# Patient Record
Sex: FEMALE | Race: WHITE | Employment: OTHER | URBAN - METROPOLITAN AREA
[De-identification: names, ages, dates, MRNs, and addresses within clinical notes are randomized per-mention and may not be internally consistent; named-entity substitution may affect disease eponyms.]

---

## 2017-01-10 ENCOUNTER — OFFICE VISIT (OUTPATIENT)
Dept: INTERNAL MEDICINE CLINIC | Facility: CLINIC | Age: 72
End: 2017-01-10

## 2017-01-10 VITALS
HEIGHT: 61 IN | SYSTOLIC BLOOD PRESSURE: 140 MMHG | WEIGHT: 179.19 LBS | DIASTOLIC BLOOD PRESSURE: 80 MMHG | HEART RATE: 76 BPM | BODY MASS INDEX: 33.83 KG/M2

## 2017-01-10 DIAGNOSIS — E78.5 HYPERLIPIDEMIA, UNSPECIFIED HYPERLIPIDEMIA TYPE: Primary | ICD-10-CM

## 2017-01-10 DIAGNOSIS — I35.0 NONRHEUMATIC AORTIC VALVE STENOSIS: ICD-10-CM

## 2017-01-10 DIAGNOSIS — I10 ESSENTIAL HYPERTENSION: ICD-10-CM

## 2017-01-10 DIAGNOSIS — R92.2 DENSE BREAST TISSUE ON MAMMOGRAM: ICD-10-CM

## 2017-01-10 DIAGNOSIS — J45.30 ASTHMA WITH ALLERGIC RHINITIS, MILD PERSISTENT, UNCOMPLICATED: ICD-10-CM

## 2017-01-10 DIAGNOSIS — Z78.0 MENOPAUSE: ICD-10-CM

## 2017-01-10 PROCEDURE — 99212 OFFICE O/P EST SF 10 MIN: CPT | Performed by: INTERNAL MEDICINE

## 2017-01-10 PROCEDURE — 99214 OFFICE O/P EST MOD 30 MIN: CPT | Performed by: INTERNAL MEDICINE

## 2017-01-10 RX ORDER — FUROSEMIDE 20 MG/1
TABLET ORAL
Qty: 90 TABLET | Refills: 1 | Status: SHIPPED | OUTPATIENT
Start: 2017-01-10 | End: 2018-01-26

## 2017-01-10 RX ORDER — LEVALBUTEROL TARTRATE 45 UG/1
2 AEROSOL, METERED ORAL EVERY 8 HOURS PRN
Qty: 3 INHALER | Refills: 1 | Status: SHIPPED | OUTPATIENT
Start: 2017-01-10 | End: 2017-08-01

## 2017-01-10 RX ORDER — ROSUVASTATIN CALCIUM 10 MG/1
TABLET, COATED ORAL
Qty: 90 TABLET | Refills: 2 | Status: SHIPPED | OUTPATIENT
Start: 2017-01-10 | End: 2017-08-01

## 2017-01-10 RX ORDER — BUDESONIDE AND FORMOTEROL FUMARATE DIHYDRATE 160; 4.5 UG/1; UG/1
AEROSOL RESPIRATORY (INHALATION)
Qty: 3 INHALER | Refills: 3 | Status: SHIPPED | OUTPATIENT
Start: 2017-01-10 | End: 2018-01-26

## 2017-01-10 RX ORDER — LOSARTAN POTASSIUM 100 MG/1
TABLET ORAL
Qty: 90 TABLET | Refills: 2 | Status: SHIPPED | OUTPATIENT
Start: 2017-01-10 | End: 2017-08-01

## 2017-01-10 NOTE — ASSESSMENT & PLAN NOTE
Last echocardiogram on March 2015. Will be due for a repeat this year. Mild aortic stenosis with a mean gradient of about 12 and valve area of about 1.78 cm.   Systolic function was however normal.  Patient has been asymptomatic at this time but has had p

## 2017-01-10 NOTE — ASSESSMENT & PLAN NOTE
Blood pressure 140/80, pulse 76, height 5' 1\" (1.549 m), weight 179 lb 3.2 oz (81.285 kg). stable blood pressure on losartan at 100 mg 1 tablet once daily and Lasix at 20 mg 1 tablet daily. No lower extremity edema at this point.   Recheck labs have bee

## 2017-01-10 NOTE — PATIENT INSTRUCTIONS
Problem List Items Addressed This Visit        Unprioritized    Aortic stenosis     Last echocardiogram on March 2015. Will be due for a repeat this year. Mild aortic stenosis with a mean gradient of about 12 and valve area of about 1.78 cm.   Systolic fu Relevant Orders    URINALYSIS, ROUTINE    Menopause     DEXA scan has been reordered.          Relevant Orders    XR DEXA BONE DENSITOMETRY (CPT=77080)

## 2017-01-10 NOTE — PROGRESS NOTES
HPI:    Patient ID: Nola Bragg is a 66year old female.     HPI Comments: Review of patient's family history indicates:    Heart Disorder                 Father                      Comment: mi    High Cholesterol               Father                    O pain or shortness of breath. Current antihyperlipidemic treatment includes diet change, exercise and statins. The current treatment provides moderate improvement of lipids. Compliance problems include adherence to diet and adherence to exercise.   Risk fact Aerosol Inhale 2 puffs into the lungs every 8 (eight) hours as needed for Wheezing or Shortness of Breath.  Disp: 3 Inhaler Rfl: 1   Budesonide-Formoterol Fumarate (SYMBICORT) 160-4.5 MCG/ACT Inhalation Aerosol 2 PUFFS BID Disp: 3 Inhaler Rfl: 3   furosemid heard.  Pulmonary/Chest: Effort normal and breath sounds normal. She has no wheezes. She has no rales. She exhibits no tenderness. Abdominal: Soft. Bowel sounds are normal. She exhibits no distension and no mass. There is no tenderness.  There is no rebou 160-4.5 MCG/ACT Inhalation Aerosol    Dense breast tissue on mammogram     Six-month follow-up diagnostic mammogram with ultrasounds have been ordered.          Relevant Orders    DORIS DIAGNOSTIC BILATERAL (CPT=77066)    Hyperlipidemia - Primary     Lipid pa BY MOUTH INTO THE LUNGS DAILY      Levalbuterol Tartrate (XOPENEX HFA) 45 MCG/ACT Inhalation Aerosol 3 Inhaler 1      Sig: Inhale 2 puffs into the lungs every 8 (eight) hours as needed for Wheezing or Shortness of Breath.       Budesonide-Formoterol Fumarat

## 2017-01-10 NOTE — ASSESSMENT & PLAN NOTE
Patient has been on Symbicort 2 puffs 2 times daily with Asmanex 1 puff once daily in addition. She has been using the Xopenex inhaler 2 puffs occasionally as needed. She does not need this on a regular basis.

## 2017-01-10 NOTE — ASSESSMENT & PLAN NOTE
Lipid panel and liver function tests have been stable on Crestor 10 mg 1 tablet once daily. Recheck labs have been ordered.

## 2017-01-19 ENCOUNTER — HOSPITAL ENCOUNTER (OUTPATIENT)
Dept: BONE DENSITY | Age: 72
Discharge: HOME | End: 2017-01-19
Attending: INTERNAL MEDICINE
Payer: COMMERCIAL

## 2017-01-19 ENCOUNTER — LAB ENCOUNTER (OUTPATIENT)
Dept: LAB | Age: 72
End: 2017-01-19
Attending: INTERNAL MEDICINE
Payer: COMMERCIAL

## 2017-01-19 DIAGNOSIS — Z78.0 MENOPAUSE: ICD-10-CM

## 2017-01-19 DIAGNOSIS — I10 ESSENTIAL HYPERTENSION: ICD-10-CM

## 2017-01-19 DIAGNOSIS — E78.5 HYPERLIPIDEMIA, UNSPECIFIED HYPERLIPIDEMIA TYPE: ICD-10-CM

## 2017-01-19 LAB
ALBUMIN SERPL BCP-MCNC: 4.1 G/DL (ref 3.5–4.8)
ALBUMIN/GLOB SERPL: 1.2 {RATIO} (ref 1–2)
ALP SERPL-CCNC: 85 U/L (ref 32–100)
ALT SERPL-CCNC: 24 U/L (ref 14–54)
ANION GAP SERPL CALC-SCNC: 10 MMOL/L (ref 0–18)
AST SERPL-CCNC: 23 U/L (ref 15–41)
BACTERIA UR QL AUTO: NEGATIVE /HPF
BASOPHILS # BLD: 0.1 K/UL (ref 0–0.2)
BASOPHILS NFR BLD: 1 %
BILIRUB SERPL-MCNC: 0.8 MG/DL (ref 0.3–1.2)
BILIRUB UR QL: NEGATIVE
BUN SERPL-MCNC: 12 MG/DL (ref 8–20)
BUN/CREAT SERPL: 16 (ref 10–20)
CALCIUM SERPL-MCNC: 9.4 MG/DL (ref 8.5–10.5)
CHLORIDE SERPL-SCNC: 101 MMOL/L (ref 95–110)
CHOLEST SERPL-MCNC: 159 MG/DL (ref 110–200)
CLARITY UR: CLEAR
CO2 SERPL-SCNC: 27 MMOL/L (ref 22–32)
COLOR UR: YELLOW
CREAT SERPL-MCNC: 0.75 MG/DL (ref 0.5–1.5)
EOSINOPHIL # BLD: 0.1 K/UL (ref 0–0.7)
EOSINOPHIL NFR BLD: 1 %
ERYTHROCYTE [DISTWIDTH] IN BLOOD BY AUTOMATED COUNT: 14.2 % (ref 11–15)
GLOBULIN PLAS-MCNC: 3.3 G/DL (ref 2.5–3.7)
GLUCOSE SERPL-MCNC: 94 MG/DL (ref 70–99)
GLUCOSE UR-MCNC: NEGATIVE MG/DL
HCT VFR BLD AUTO: 42.9 % (ref 35–48)
HDLC SERPL-MCNC: 63 MG/DL
HGB BLD-MCNC: 14 G/DL (ref 12–16)
HGB UR QL STRIP.AUTO: NEGATIVE
KETONES UR-MCNC: NEGATIVE MG/DL
LDLC SERPL CALC-MCNC: 80 MG/DL (ref 0–99)
LYMPHOCYTES # BLD: 1.9 K/UL (ref 1–4)
LYMPHOCYTES NFR BLD: 25 %
MCH RBC QN AUTO: 27.8 PG (ref 27–32)
MCHC RBC AUTO-ENTMCNC: 32.6 G/DL (ref 32–37)
MCV RBC AUTO: 85.4 FL (ref 80–100)
MONOCYTES # BLD: 0.5 K/UL (ref 0–1)
MONOCYTES NFR BLD: 7 %
NEUTROPHILS # BLD AUTO: 5 K/UL (ref 1.8–7.7)
NEUTROPHILS NFR BLD: 66 %
NITRITE UR QL STRIP.AUTO: NEGATIVE
NONHDLC SERPL-MCNC: 96 MG/DL
OSMOLALITY UR CALC.SUM OF ELEC: 286 MOSM/KG (ref 275–295)
PH UR: 6 [PH] (ref 5–8)
PLATELET # BLD AUTO: 225 K/UL (ref 140–400)
PMV BLD AUTO: 10 FL (ref 7.4–10.3)
POTASSIUM SERPL-SCNC: 4.1 MMOL/L (ref 3.3–5.1)
PROT SERPL-MCNC: 7.4 G/DL (ref 5.9–8.4)
PROT UR-MCNC: NEGATIVE MG/DL
RBC # BLD AUTO: 5.02 M/UL (ref 3.7–5.4)
RBC #/AREA URNS AUTO: <1 /HPF
SODIUM SERPL-SCNC: 138 MMOL/L (ref 136–144)
SP GR UR STRIP: 1.01 (ref 1–1.03)
TRIGL SERPL-MCNC: 80 MG/DL (ref 1–149)
TSH SERPL-ACNC: 1.57 UIU/ML (ref 0.34–5.6)
UROBILINOGEN UR STRIP-ACNC: <2
VIT B12 SERPL-MCNC: 331 PG/ML (ref 181–914)
VIT C UR-MCNC: NEGATIVE MG/DL
WBC # BLD AUTO: 7.6 K/UL (ref 4–11)
WBC #/AREA URNS AUTO: 2 /HPF

## 2017-01-19 PROCEDURE — 82607 VITAMIN B-12: CPT

## 2017-01-19 PROCEDURE — 80053 COMPREHEN METABOLIC PANEL: CPT

## 2017-01-19 PROCEDURE — 84443 ASSAY THYROID STIM HORMONE: CPT

## 2017-01-19 PROCEDURE — 36415 COLL VENOUS BLD VENIPUNCTURE: CPT

## 2017-01-19 PROCEDURE — 80061 LIPID PANEL: CPT

## 2017-01-19 PROCEDURE — 85025 COMPLETE CBC W/AUTO DIFF WBC: CPT

## 2017-01-19 PROCEDURE — 81001 URINALYSIS AUTO W/SCOPE: CPT

## 2017-01-19 PROCEDURE — 82306 VITAMIN D 25 HYDROXY: CPT

## 2017-01-19 PROCEDURE — 77080 DXA BONE DENSITY AXIAL: CPT

## 2017-01-20 LAB — 25(OH)D3 SERPL-MCNC: 33 NG/ML

## 2017-01-23 ENCOUNTER — HOSPITAL ENCOUNTER (OUTPATIENT)
Dept: MAMMOGRAPHY | Facility: HOSPITAL | Age: 72
Discharge: HOME | End: 2017-01-23
Attending: INTERNAL MEDICINE
Payer: COMMERCIAL

## 2017-01-23 DIAGNOSIS — R92.2 DENSE BREAST TISSUE ON MAMMOGRAM: ICD-10-CM

## 2017-01-23 PROCEDURE — 77066 DX MAMMO INCL CAD BI: CPT | Performed by: RADIOLOGY

## 2017-08-01 ENCOUNTER — OFFICE VISIT (OUTPATIENT)
Dept: INTERNAL MEDICINE CLINIC | Facility: CLINIC | Age: 72
End: 2017-08-01

## 2017-08-01 VITALS
WEIGHT: 184 LBS | DIASTOLIC BLOOD PRESSURE: 73 MMHG | HEART RATE: 76 BPM | BODY MASS INDEX: 34.74 KG/M2 | HEIGHT: 61 IN | SYSTOLIC BLOOD PRESSURE: 154 MMHG | TEMPERATURE: 99 F

## 2017-08-01 DIAGNOSIS — Z23 NEED FOR VACCINATION: ICD-10-CM

## 2017-08-01 DIAGNOSIS — I35.0 NONRHEUMATIC AORTIC VALVE STENOSIS: Primary | ICD-10-CM

## 2017-08-01 DIAGNOSIS — Z00.00 ROUTINE PHYSICAL EXAMINATION: ICD-10-CM

## 2017-08-01 DIAGNOSIS — J45.30 ASTHMA WITH ALLERGIC RHINITIS, MILD PERSISTENT, UNCOMPLICATED: ICD-10-CM

## 2017-08-01 DIAGNOSIS — E78.5 HYPERLIPIDEMIA, UNSPECIFIED HYPERLIPIDEMIA TYPE: ICD-10-CM

## 2017-08-01 DIAGNOSIS — I10 ESSENTIAL HYPERTENSION: ICD-10-CM

## 2017-08-01 DIAGNOSIS — R14.0 ABDOMINAL DISTENSION: ICD-10-CM

## 2017-08-01 PROCEDURE — 90471 IMMUNIZATION ADMIN: CPT | Performed by: INTERNAL MEDICINE

## 2017-08-01 PROCEDURE — 99213 OFFICE O/P EST LOW 20 MIN: CPT | Performed by: INTERNAL MEDICINE

## 2017-08-01 PROCEDURE — 99212 OFFICE O/P EST SF 10 MIN: CPT | Performed by: INTERNAL MEDICINE

## 2017-08-01 PROCEDURE — 90670 PCV13 VACCINE IM: CPT | Performed by: INTERNAL MEDICINE

## 2017-08-01 PROCEDURE — 99397 PER PM REEVAL EST PAT 65+ YR: CPT | Performed by: INTERNAL MEDICINE

## 2017-08-01 RX ORDER — AMLODIPINE BESYLATE 2.5 MG/1
2.5 TABLET ORAL DAILY
Qty: 30 TABLET | Refills: 2 | Status: SHIPPED | OUTPATIENT
Start: 2017-08-01 | End: 2017-09-29

## 2017-08-01 RX ORDER — LOSARTAN POTASSIUM 100 MG/1
TABLET ORAL
Qty: 90 TABLET | Refills: 2 | Status: SHIPPED | OUTPATIENT
Start: 2017-08-01 | End: 2018-01-26

## 2017-08-01 RX ORDER — IPRATROPIUM BROMIDE AND ALBUTEROL SULFATE 2.5; .5 MG/3ML; MG/3ML
SOLUTION RESPIRATORY (INHALATION)
Qty: 90 VIAL | Refills: 0 | Status: SHIPPED | OUTPATIENT
Start: 2017-08-01

## 2017-08-01 RX ORDER — ROSUVASTATIN CALCIUM 10 MG/1
TABLET, COATED ORAL
Qty: 90 TABLET | Refills: 2 | Status: SHIPPED | OUTPATIENT
Start: 2017-08-01 | End: 2018-01-26

## 2017-08-01 RX ORDER — LEVALBUTEROL TARTRATE 45 UG/1
2 AEROSOL, METERED ORAL EVERY 8 HOURS PRN
Qty: 3 INHALER | Refills: 1 | Status: SHIPPED | OUTPATIENT
Start: 2017-08-01 | End: 2018-01-26

## 2017-08-01 RX ORDER — FLUCONAZOLE 150 MG/1
150 TABLET ORAL ONCE
Qty: 1 TABLET | Refills: 0 | Status: SHIPPED | OUTPATIENT
Start: 2017-08-01 | End: 2017-08-01

## 2017-08-01 NOTE — PROGRESS NOTES
HPI:    Patient ID: Bobby Camargo is a 67year old female. HPI     Physical    Mammogram,1 Yr due on 01/23/2018  Dexa Scan done in January 2017 looked normal.    PAP last done a few yrs back-all normal so far and hence not required at this point.   Srikanth Benjamin bladder. Pt herself was a smoker and quit 47 years back. Pt has had a abnormal pap a long time back and underwent a colposcopy. all paps after were normal.     Endocrine: Negative. Genitourinary: Positive for flank pain and pelvic pain.    Skin: Negat Disp:  Rfl:      Allergies:  Azithromycin            Hives  Hydrochlorothiazide     Myalgia    Blood pressure 154/73, pulse 76, temperature 98.8 °F (37.1 °C), temperature source Oral, height 5' 1\" (1.549 m), weight 184 lb (83.5 kg).    Body mass index is 3 vaccination  Abdominal distension  Problem List Items Addressed This Visit        Unprioritized    Abdominal distension     Persistent  abdominal distention with generalized abdominal pain worse in the left flank. History of diverticulosis.   History of an completed .          Relevant Medications    losartan 100 MG Oral Tab    AmLODIPine Besylate 2.5 MG Oral Tab    Other Relevant Orders    CARD ECHO 2D DOPPLER (CPT=93306)    CBC WITH DIFFERENTIAL WITH PLATELET    COMP METABOLIC PANEL (14)    LIPID PANEL    A [E]      TSH [E]      Urinalysis, Routine [E]      Microalb/Creat Ratio, Random Urine [E]      Prevnar (Pneumococcal 13) (Same dose all ages)    Meds This Visit:  Signed Prescriptions Disp Refills    ipratropium-albuterol (DUONEB) 0.5-2.5 (3) MG/3ML Inhala

## 2017-08-01 NOTE — ASSESSMENT & PLAN NOTE
Blood pressure 154/73, pulse 76, temperature 98.8 °F (37.1 °C), temperature source Oral, height 5' 1\" (1.549 m), weight 184 lb (83.5 kg). Blood pressures upon recheck remained elevated.   Advised to start on amlodipine 2.5 mg 1 tablet once daily in add

## 2017-08-01 NOTE — PROGRESS NOTES
Pt presents for Prevnar 13 vaccine, pt name and  verified , pt administered Prevnar 13 IM in the left deltoid, pt tolerated injection well, no adverse reaction noted    LOT- [id number]  EXP-05/18

## 2017-08-01 NOTE — PATIENT INSTRUCTIONS
Problem List Items Addressed This Visit        Unprioritized    Abdominal distension     Persistent  abdominal distention with generalized abdominal pain worse in the left flank. History of diverticulosis.   History of an abnormal Pap and is status post co Medications    losartan 100 MG Oral Tab    AmLODIPine Besylate 2.5 MG Oral Tab    Other Relevant Orders    CARD ECHO 2D DOPPLER (CPT=93332)    CBC WITH DIFFERENTIAL WITH PLATELET    COMP METABOLIC PANEL (14)    LIPID PANEL    ASSAY, THYROID STIM HORMONE

## 2017-08-01 NOTE — ASSESSMENT & PLAN NOTE
Normal exam.  Labs as ordered. Skin check normal.  Multiple scattered abnormal nevi present–advised to follow-up with dermatology as discussed.   Eye exam completed at [facility]–advised to follow-up with Dr. Steph Cabral for an evaluation for cataracts,

## 2017-08-01 NOTE — ASSESSMENT & PLAN NOTE
Lipid panel and liver function tests have been stable on Crestor 10 mg 1 tablet once daily, recheck labs have been ordered.

## 2017-08-01 NOTE — ASSESSMENT & PLAN NOTE
History of aortic stenosis–mean gradient across the valve was about 12 with the surface. 1.78 cm. Systolic function was normal at that time. Last echocardiogram in 2015. Repeat has been ordered today and will follow-up after completed.

## 2017-08-01 NOTE — ASSESSMENT & PLAN NOTE
Persistent  abdominal distention with generalized abdominal pain worse in the left flank. History of diverticulosis. History of an abnormal Pap and is status post colposcopy. Last Pap was a while back–no records available here.   CT abdomen kidney stone

## 2017-09-18 ENCOUNTER — HOSPITAL ENCOUNTER (OUTPATIENT)
Dept: CT IMAGING | Facility: HOSPITAL | Age: 72
Discharge: HOME | End: 2017-09-18
Attending: INTERNAL MEDICINE
Payer: COMMERCIAL

## 2017-09-18 ENCOUNTER — HOSPITAL ENCOUNTER (OUTPATIENT)
Dept: CV DIAGNOSTICS | Facility: HOSPITAL | Age: 72
Discharge: HOME | End: 2017-09-18
Attending: INTERNAL MEDICINE
Payer: COMMERCIAL

## 2017-09-18 DIAGNOSIS — I10 ESSENTIAL HYPERTENSION: ICD-10-CM

## 2017-09-18 DIAGNOSIS — R14.0 ABDOMINAL DISTENSION: ICD-10-CM

## 2017-09-18 DIAGNOSIS — I35.0 NONRHEUMATIC AORTIC VALVE STENOSIS: ICD-10-CM

## 2017-09-18 PROCEDURE — 74176 CT ABD & PELVIS W/O CONTRAST: CPT | Performed by: INTERNAL MEDICINE

## 2017-09-18 PROCEDURE — 93306 TTE W/DOPPLER COMPLETE: CPT | Performed by: INTERNAL MEDICINE

## 2017-09-19 ENCOUNTER — LAB ENCOUNTER (OUTPATIENT)
Dept: LAB | Age: 72
End: 2017-09-19
Attending: INTERNAL MEDICINE
Payer: COMMERCIAL

## 2017-09-19 DIAGNOSIS — E78.5 HYPERLIPIDEMIA, UNSPECIFIED HYPERLIPIDEMIA TYPE: ICD-10-CM

## 2017-09-19 DIAGNOSIS — I10 ESSENTIAL HYPERTENSION: ICD-10-CM

## 2017-09-19 LAB
ALBUMIN SERPL BCP-MCNC: 3.9 G/DL (ref 3.5–4.8)
ALBUMIN/GLOB SERPL: 1.3 {RATIO} (ref 1–2)
ALP SERPL-CCNC: 66 U/L (ref 32–100)
ALT SERPL-CCNC: 20 U/L (ref 14–54)
ANION GAP SERPL CALC-SCNC: 7 MMOL/L (ref 0–18)
AST SERPL-CCNC: 21 U/L (ref 15–41)
BACTERIA UR QL AUTO: NEGATIVE /HPF
BASOPHILS # BLD: 0.1 K/UL (ref 0–0.2)
BASOPHILS NFR BLD: 1 %
BILIRUB SERPL-MCNC: 0.5 MG/DL (ref 0.3–1.2)
BILIRUB UR QL: NEGATIVE
BUN SERPL-MCNC: 11 MG/DL (ref 8–20)
BUN/CREAT SERPL: 14.9 (ref 10–20)
CALCIUM SERPL-MCNC: 8.8 MG/DL (ref 8.5–10.5)
CHLORIDE SERPL-SCNC: 106 MMOL/L (ref 95–110)
CHOLEST SERPL-MCNC: 120 MG/DL (ref 110–200)
CLARITY UR: CLEAR
CO2 SERPL-SCNC: 28 MMOL/L (ref 22–32)
COLOR UR: YELLOW
CREAT SERPL-MCNC: 0.74 MG/DL (ref 0.5–1.5)
CREAT UR-MCNC: 118.6 MG/DL
EOSINOPHIL # BLD: 0.1 K/UL (ref 0–0.7)
EOSINOPHIL NFR BLD: 2 %
ERYTHROCYTE [DISTWIDTH] IN BLOOD BY AUTOMATED COUNT: 14.9 % (ref 11–15)
GLOBULIN PLAS-MCNC: 3 G/DL (ref 2.5–3.7)
GLUCOSE SERPL-MCNC: 98 MG/DL (ref 70–99)
GLUCOSE UR-MCNC: NEGATIVE MG/DL
HCT VFR BLD AUTO: 38.4 % (ref 35–48)
HDLC SERPL-MCNC: 51 MG/DL
HGB BLD-MCNC: 12.9 G/DL (ref 12–16)
KETONES UR-MCNC: NEGATIVE MG/DL
LDLC SERPL CALC-MCNC: 52 MG/DL (ref 0–99)
LYMPHOCYTES # BLD: 1.6 K/UL (ref 1–4)
LYMPHOCYTES NFR BLD: 26 %
MCH RBC QN AUTO: 28.5 PG (ref 27–32)
MCHC RBC AUTO-ENTMCNC: 33.5 G/DL (ref 32–37)
MCV RBC AUTO: 84.9 FL (ref 80–100)
MICROALBUMIN UR-MCNC: 0.6 MG/DL (ref 0–1.8)
MICROALBUMIN/CREAT UR: 5.1 MG/G{CREAT} (ref 0–20)
MONOCYTES # BLD: 0.6 K/UL (ref 0–1)
MONOCYTES NFR BLD: 9 %
NEUTROPHILS # BLD AUTO: 3.8 K/UL (ref 1.8–7.7)
NEUTROPHILS NFR BLD: 62 %
NITRITE UR QL STRIP.AUTO: NEGATIVE
NONHDLC SERPL-MCNC: 69 MG/DL
OSMOLALITY UR CALC.SUM OF ELEC: 291 MOSM/KG (ref 275–295)
PH UR: 5 [PH] (ref 5–8)
PLATELET # BLD AUTO: 203 K/UL (ref 140–400)
PMV BLD AUTO: 10.1 FL (ref 7.4–10.3)
POTASSIUM SERPL-SCNC: 4 MMOL/L (ref 3.3–5.1)
PROT SERPL-MCNC: 6.9 G/DL (ref 5.9–8.4)
PROT UR-MCNC: NEGATIVE MG/DL
RBC # BLD AUTO: 4.52 M/UL (ref 3.7–5.4)
RBC #/AREA URNS AUTO: 2 /HPF
SODIUM SERPL-SCNC: 141 MMOL/L (ref 136–144)
SP GR UR STRIP: 1.01 (ref 1–1.03)
TRIGL SERPL-MCNC: 87 MG/DL (ref 1–149)
TSH SERPL-ACNC: 0.99 UIU/ML (ref 0.45–5.33)
UROBILINOGEN UR STRIP-ACNC: <2
VIT C UR-MCNC: NEGATIVE MG/DL
WBC # BLD AUTO: 6.1 K/UL (ref 4–11)
WBC #/AREA URNS AUTO: 3 /HPF

## 2017-09-19 PROCEDURE — 85025 COMPLETE CBC W/AUTO DIFF WBC: CPT

## 2017-09-19 PROCEDURE — 81001 URINALYSIS AUTO W/SCOPE: CPT

## 2017-09-19 PROCEDURE — 82043 UR ALBUMIN QUANTITATIVE: CPT

## 2017-09-19 PROCEDURE — 84443 ASSAY THYROID STIM HORMONE: CPT

## 2017-09-19 PROCEDURE — 36415 COLL VENOUS BLD VENIPUNCTURE: CPT

## 2017-09-19 PROCEDURE — 82570 ASSAY OF URINE CREATININE: CPT

## 2017-09-19 PROCEDURE — 80053 COMPREHEN METABOLIC PANEL: CPT

## 2017-09-19 PROCEDURE — 80061 LIPID PANEL: CPT

## 2017-09-25 ENCOUNTER — PATIENT OUTREACH (OUTPATIENT)
Dept: INTERNAL MEDICINE CLINIC | Facility: CLINIC | Age: 72
End: 2017-09-25

## 2017-09-29 ENCOUNTER — OFFICE VISIT (OUTPATIENT)
Dept: INTERNAL MEDICINE CLINIC | Facility: CLINIC | Age: 72
End: 2017-09-29

## 2017-09-29 VITALS
HEIGHT: 61 IN | RESPIRATION RATE: 16 BRPM | WEIGHT: 175 LBS | SYSTOLIC BLOOD PRESSURE: 143 MMHG | BODY MASS INDEX: 33.04 KG/M2 | HEART RATE: 71 BPM | DIASTOLIC BLOOD PRESSURE: 86 MMHG

## 2017-09-29 DIAGNOSIS — Z12.4 ROUTINE PAPANICOLAOU SMEAR: ICD-10-CM

## 2017-09-29 DIAGNOSIS — K80.20 CALCULUS OF GALLBLADDER WITHOUT CHOLECYSTITIS WITHOUT OBSTRUCTION: ICD-10-CM

## 2017-09-29 DIAGNOSIS — R14.0 ABDOMINAL DISTENSION: Primary | ICD-10-CM

## 2017-09-29 DIAGNOSIS — E78.5 HYPERLIPIDEMIA, UNSPECIFIED HYPERLIPIDEMIA TYPE: ICD-10-CM

## 2017-09-29 DIAGNOSIS — I10 ESSENTIAL HYPERTENSION: ICD-10-CM

## 2017-09-29 PROCEDURE — 99212 OFFICE O/P EST SF 10 MIN: CPT | Performed by: INTERNAL MEDICINE

## 2017-09-29 PROCEDURE — 90471 IMMUNIZATION ADMIN: CPT | Performed by: INTERNAL MEDICINE

## 2017-09-29 PROCEDURE — 99214 OFFICE O/P EST MOD 30 MIN: CPT | Performed by: INTERNAL MEDICINE

## 2017-09-29 PROCEDURE — 90653 IIV ADJUVANT VACCINE IM: CPT | Performed by: INTERNAL MEDICINE

## 2017-09-29 RX ORDER — AMLODIPINE BESYLATE 5 MG/1
5 TABLET ORAL DAILY
Qty: 90 TABLET | Refills: 1 | Status: SHIPPED | OUTPATIENT
Start: 2017-09-29 | End: 2018-01-26

## 2017-09-29 NOTE — ASSESSMENT & PLAN NOTE
Blood pressure 143/86, pulse 71, resp. rate 16, height 5' 1\" (1.549 m), weight 175 lb (79.4 kg). Currently on amlodipine at 2.5 mg 1 tablet once daily and losartan at 100 mg once daily.   Mild aortic stenosis with a gradient of 11 per recent echocardiogra

## 2017-09-29 NOTE — PROGRESS NOTES
HPI:    Patient ID: Maisha Lara is a 67year old female. Ct kidneys stone protocol    CONCLUSION:   1. No urolithiasis or renal obstruction. 2. Colonic diverticulosis.   3. A 1.4 cm exophytic gastric fundal soft tissue nodule unchanged from January 2 current episode started more than 1 year ago. The problem has been gradually improving since onset. The problem is controlled. Pertinent negatives include no anxiety, blurred vision, peripheral edema or shortness of breath.  There are no associated agents t mouth daily.  Disp: 90 tablet Rfl: 1   ipratropium-albuterol (DUONEB) 0.5-2.5 (3) MG/3ML Inhalation Solution Use once a day if needed Disp: 90 vial Rfl: 0   Levalbuterol Tartrate (XOPENEX HFA) 45 MCG/ACT Inhalation Aerosol Inhale 2 puffs into the lungs ever Ear: External ear normal.   Left Ear: External ear normal.   Nose: Nose normal.   Mouth/Throat: Oropharynx is clear and moist. No oropharyngeal exudate. Eyes: Conjunctivae and EOM are normal. Pupils are equal, round, and reactive to light.  Right eye exhi she has had persistent abdominal distention in the upper abdomen. No other abnormalities noted on her recent CT scan excepting for diverticulosis without inflammation.   Patient is advised to follow-up with gastroenterology–Dr. ORNELAS–[phone] for an delores

## 2017-09-29 NOTE — ASSESSMENT & PLAN NOTE
Lipid panel and liver function tests stable on Crestor. Patient has tolerated her medications well thus far.

## 2017-09-29 NOTE — PATIENT INSTRUCTIONS
Problem List Items Addressed This Visit        Unprioritized    Abdominal distension - Primary    Relevant Orders    GASTRO - INTERNAL    Calculus of gallbladder without cholecystitis without obstruction     Incidentally found calcified gallstone at the ga

## 2017-09-29 NOTE — ASSESSMENT & PLAN NOTE
Incidentally found calcified gallstone at the gallbladder neck. Patient is asymptomatic–no abdominal pain, nausea, vomiting after fatty foods. However she has had persistent abdominal distention in the upper abdomen.   No other abnormalities noted on her

## 2017-09-29 NOTE — ASSESSMENT & PLAN NOTE
Persistent abdominal distention with generalized abdominal discomfort–at her last visit in the left flank. CT scan did not show any diverticulitis. Left flank pain seems to have resolved.   Pap smear completed as a follow-up for her history of abnormal Pa

## 2017-10-04 ENCOUNTER — TELEPHONE (OUTPATIENT)
Dept: OTHER | Age: 72
End: 2017-10-04

## 2017-10-04 NOTE — TELEPHONE ENCOUNTER
Please advise on [employer] message below.     To: [employer] Customer Response Pool   Subject: Question for Dr. Tate Boswell regarding signing up*      Topic: Referral Question      For Doctor Taryn Henderson - just spoke with my representative for the Medicare Part D which I

## 2017-10-07 NOTE — TELEPHONE ENCOUNTER
Dr Obi Avalos, pt advised of your message. She is requesting that you send an order for Breo to the [facility] in [place]. Please confirm dose / instructions / quantity.

## 2017-10-11 ENCOUNTER — PATIENT OUTREACH (OUTPATIENT)
Dept: INTERNAL MEDICINE CLINIC | Facility: CLINIC | Age: 72
End: 2017-10-11

## 2017-10-11 NOTE — PROGRESS NOTES
Outreached to patient in regards to enrollment to Chronic Care Management program, patient stated at this time she will have to pass up due to possibly moving in the near future. Patient did take my number ext. [phone] just in case change of plans.   Thank yo

## 2017-10-13 ENCOUNTER — NURSE ONLY (OUTPATIENT)
Dept: INTERNAL MEDICINE CLINIC | Facility: CLINIC | Age: 72
End: 2017-10-13

## 2017-10-13 VITALS — SYSTOLIC BLOOD PRESSURE: 120 MMHG | HEART RATE: 72 BPM | DIASTOLIC BLOOD PRESSURE: 78 MMHG

## 2017-10-13 DIAGNOSIS — I10 ESSENTIAL HYPERTENSION: Primary | ICD-10-CM

## 2017-10-13 PROCEDURE — 99212 OFFICE O/P EST SF 10 MIN: CPT | Performed by: INTERNAL MEDICINE

## 2017-10-13 NOTE — PROGRESS NOTES
Patient here for B/P check name and birth date confirmed. Blood pressure medication reviewed and patient confirmed she is taking all medications. B/P 120/78 in left arm  And 120/76 in right arm.  Patient advised Ghazala Knutson will be made aware of her B/P avril

## 2017-10-24 ENCOUNTER — TELEPHONE (OUTPATIENT)
Dept: OTHER | Age: 72
End: 2017-10-24

## 2017-10-24 NOTE — TELEPHONE ENCOUNTER
Spoke to [facility], they are calling for a refill of Amlodipine 2.5mg. Informed them that dose was changed at the [address] [place] [address] on 9/29/17. They have the new order for Amlodipine 5mg and will discontinue the 2.5mg prescription.     From [address] [place] [address] on 9/29/17:  \"A

## 2017-12-29 ENCOUNTER — OFFICE VISIT (OUTPATIENT)
Dept: GASTROENTEROLOGY | Facility: CLINIC | Age: 72
End: 2017-12-29

## 2017-12-29 ENCOUNTER — TELEPHONE (OUTPATIENT)
Dept: GASTROENTEROLOGY | Facility: CLINIC | Age: 72
End: 2017-12-29

## 2017-12-29 VITALS
WEIGHT: 163.81 LBS | SYSTOLIC BLOOD PRESSURE: 150 MMHG | BODY MASS INDEX: 30.93 KG/M2 | HEART RATE: 61 BPM | HEIGHT: 61 IN | DIASTOLIC BLOOD PRESSURE: 77 MMHG

## 2017-12-29 DIAGNOSIS — K59.01 SLOW TRANSIT CONSTIPATION: ICD-10-CM

## 2017-12-29 DIAGNOSIS — Z12.11 COLON CANCER SCREENING: Primary | ICD-10-CM

## 2017-12-29 DIAGNOSIS — Z12.11 ENCOUNTER FOR SCREENING COLONOSCOPY: Primary | ICD-10-CM

## 2017-12-29 PROCEDURE — 99203 OFFICE O/P NEW LOW 30 MIN: CPT | Performed by: INTERNAL MEDICINE

## 2017-12-29 PROCEDURE — G0463 HOSPITAL OUTPT CLINIC VISIT: HCPCS | Performed by: INTERNAL MEDICINE

## 2017-12-29 NOTE — PATIENT INSTRUCTIONS
Schedule colonoscopy exam at [facility] ([facility] [address]    Body mass index is 30.95 kg/m². This patient IS appropriate for the [facility] endoscopy center.     IV sedation (conscious sedation)  MAC anesthesia if NELM    Suprep smal

## 2017-12-29 NOTE — TELEPHONE ENCOUNTER
Scheduled for:  Colonoscopy 36567  Provider Name: Puma Coyne  Date:  1/17/18  Location:  [address] [place] [address]  Sedation:  MAC  Time: 1400 / ARRIVAL 1300   Prep: Split dose Suprep  Meds/Allergies Reconciled?:  Physician reviewed  Diagnosis with codes:  Colon Cancer Screenin

## 2017-12-29 NOTE — PROGRESS NOTES
HPI:    Patient ID: Vladimir Abrams is a 67year old woman with history hypertension, dyslipidemia referred to us by Dr. Patrick Brantley for evaluation of recent abdominal distention symptoms.     Ms. Lima Comment describes waxing and waning abdominal gas and distention re Calcified gallstone in the gallbladder neck unchanged. No evidence for cholecystitis or biliary dilatation. PANCREAS:      Normal unenhanced pancreas. SPLEEN:           Normal unenhanced spleen. A 1 cm accessory splenule.   AORTA/VASCULAR:      No an cysts. 10. 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 11. Mitral annulus calcification. 12. Advanced multilevel disc and facet degeneration and lumbar spine.       HPI    Review of Systems         Current Outpatien aspirin 81 MG Oral Tab Take 81 mg by mouth daily. Disp:  Rfl:      Allergies:  Azithromycin            Hives  Hydrochlorothiazide     Myalgia   PHYSICAL EXAM:   Physical Exam   Constitutional: She is oriented to person, place, and time.  She appears well-

## 2018-01-17 ENCOUNTER — SURGERY (OUTPATIENT)
Age: 73
End: 2018-01-17

## 2018-01-17 ENCOUNTER — ANESTHESIA (OUTPATIENT)
Dept: ENDOSCOPY | Age: 73
End: 2018-01-17
Payer: MEDICARE

## 2018-01-17 ENCOUNTER — HOSPITAL ENCOUNTER (OUTPATIENT)
Age: 73
Setting detail: HOSPITAL OUTPATIENT SURGERY
Discharge: HOME | End: 2018-01-17
Attending: INTERNAL MEDICINE | Admitting: INTERNAL MEDICINE
Payer: MEDICARE

## 2018-01-17 ENCOUNTER — ANESTHESIA EVENT (OUTPATIENT)
Dept: ENDOSCOPY | Age: 73
End: 2018-01-17
Payer: MEDICARE

## 2018-01-17 VITALS
OXYGEN SATURATION: 98 % | DIASTOLIC BLOOD PRESSURE: 68 MMHG | HEIGHT: 61.5 IN | SYSTOLIC BLOOD PRESSURE: 136 MMHG | BODY MASS INDEX: 30.94 KG/M2 | HEART RATE: 69 BPM | WEIGHT: 166 LBS | RESPIRATION RATE: 20 BRPM

## 2018-01-17 DIAGNOSIS — K64.9 HEMORRHOIDS: ICD-10-CM

## 2018-01-17 DIAGNOSIS — K57.90 DIVERTICULOSIS: ICD-10-CM

## 2018-01-17 DIAGNOSIS — Z12.11 COLON CANCER SCREENING: ICD-10-CM

## 2018-01-17 DIAGNOSIS — K63.5 POLYP OF HEPATIC FLEXURE OF COLON: Primary | ICD-10-CM

## 2018-01-17 PROCEDURE — 45385 COLONOSCOPY W/LESION REMOVAL: CPT | Performed by: INTERNAL MEDICINE

## 2018-01-17 RX ORDER — NALOXONE HYDROCHLORIDE 0.4 MG/ML
80 INJECTION, SOLUTION INTRAMUSCULAR; INTRAVENOUS; SUBCUTANEOUS AS NEEDED
Status: DISCONTINUED | OUTPATIENT
Start: 2018-01-17 | End: 2018-01-17

## 2018-01-17 RX ORDER — MIDAZOLAM HYDROCHLORIDE 1 MG/ML
1 INJECTION INTRAMUSCULAR; INTRAVENOUS EVERY 5 MIN PRN
Status: DISCONTINUED | OUTPATIENT
Start: 2018-01-17 | End: 2018-01-17

## 2018-01-17 RX ORDER — SODIUM CHLORIDE 9 MG/ML
INJECTION, SOLUTION INTRAVENOUS CONTINUOUS
Status: DISCONTINUED | OUTPATIENT
Start: 2018-01-17 | End: 2018-01-17

## 2018-01-17 RX ORDER — SODIUM CHLORIDE, SODIUM LACTATE, POTASSIUM CHLORIDE, CALCIUM CHLORIDE 600; 310; 30; 20 MG/100ML; MG/100ML; MG/100ML; MG/100ML
INJECTION, SOLUTION INTRAVENOUS CONTINUOUS PRN
Status: DISCONTINUED | OUTPATIENT
Start: 2018-01-17 | End: 2018-01-17 | Stop reason: SURG

## 2018-01-17 RX ORDER — SODIUM CHLORIDE 0.9 % (FLUSH) 0.9 %
10 SYRINGE (ML) INJECTION AS NEEDED
Status: DISCONTINUED | OUTPATIENT
Start: 2018-01-17 | End: 2018-01-17

## 2018-01-17 RX ORDER — LIDOCAINE HYDROCHLORIDE 10 MG/ML
INJECTION, SOLUTION EPIDURAL; INFILTRATION; INTRACAUDAL; PERINEURAL AS NEEDED
Status: DISCONTINUED | OUTPATIENT
Start: 2018-01-17 | End: 2018-01-17 | Stop reason: SURG

## 2018-01-17 RX ORDER — SODIUM CHLORIDE, SODIUM LACTATE, POTASSIUM CHLORIDE, CALCIUM CHLORIDE 600; 310; 30; 20 MG/100ML; MG/100ML; MG/100ML; MG/100ML
INJECTION, SOLUTION INTRAVENOUS CONTINUOUS
Status: DISCONTINUED | OUTPATIENT
Start: 2018-01-17 | End: 2018-01-17

## 2018-01-17 RX ADMIN — SODIUM CHLORIDE, SODIUM LACTATE, POTASSIUM CHLORIDE, CALCIUM CHLORIDE: 600; 310; 30; 20 INJECTION, SOLUTION INTRAVENOUS at 14:22:00

## 2018-01-17 RX ADMIN — LIDOCAINE HYDROCHLORIDE 60 MG: 10 INJECTION, SOLUTION EPIDURAL; INFILTRATION; INTRACAUDAL; PERINEURAL at 14:23:00

## 2018-01-17 RX ADMIN — SODIUM CHLORIDE, SODIUM LACTATE, POTASSIUM CHLORIDE, CALCIUM CHLORIDE: 600; 310; 30; 20 INJECTION, SOLUTION INTRAVENOUS at 14:50:00

## 2018-01-17 NOTE — ANESTHESIA PREPROCEDURE EVALUATION
Anesthesia PreOp Note    HPI:     Brenda Monae is a 67year old female who presents for preoperative consultation requested by: John Fowler MD    Date of Surgery: 1/17/2018    Procedure(s):  COLONOSCOPY  Indication: Colon cancer screening the lungs daily. Disp: 1 each Rfl: 5 1/17/2018   AmLODIPine Besylate 5 MG Oral Tab Take 1 tablet (5 mg total) by mouth daily.  (Patient taking differently: Take 5 mg by mouth nightly.  ) Disp: 90 tablet Rfl: 1 1/17/2018   ipratropium-albuterol (DUONEB) 0.5- Hives  Hydrochlorothiazide     Myalgia    Family History   Problem Relation Age of Onset   • Heart Disorder Father 50     mi   • High Cholesterol Father    • Other Rolly Fulton Father    • Dementia Mother    • Heart Disorder Paternal Grandmother    • stroke [OT Anesthesia Plan:   ASA:  3  Plan:   MAC  Informed Consent Plan and Risks Discussed With:  Patient and spouse      I have informed Brenda Panccasimiro  of the nature of the anesthetic plan, benefits, risks, major complications, and any alternative forms of ane

## 2018-01-17 NOTE — ANESTHESIA POSTPROCEDURE EVALUATION
Patient: Nita Leonardo    Procedure Summary     Date:  01/17/18 Room / Location:  [facility] ENDOSCOPY 01 / [facility] ENDO    Anesthesia Start:  2155 Anesthesia Stop:      Procedure:  COLONOSCOPY (N/A ) Diagnosis:       Colon cancer screening      (diverticulosis,

## 2018-01-17 NOTE — H&P
History & Physical Examination    Patient Name: Clarice Woodard  MRN: [id number]  CSN: [id number]  YOB: 1945    Diagnosis: Screening colonoscopy examination, personal history colon polyps    Present Illness:     67year old woman with history 1/17/2018   ipratropium-albuterol (DUONEB) 0.5-2.5 (3) MG/3ML Inhalation Solution Use once a day if needed Disp: 90 vial Rfl: 0 1/17/2018   Levalbuterol Tartrate (XOPENEX HFA) 45 MCG/ACT Inhalation Aerosol Inhale 2 puffs into the lungs every 8 (eight) hour life   • Biliary calculus Was recently detected    Supposedly stone in neck of gallblatter   • Colon polyp Approx. 2009    Detected and removed during colonoscopy   • Essential hypertension 2005?     On medication   • Extrinsic asthma, unspecified    • Hemo

## 2018-01-17 NOTE — OPERATIVE REPORT
COLONOSCOPY PROCEDURE REPORT     DATE OF PROCEDURE:  1/17/2018     PCP: Mike Noble MD     PREOPERATIVE DIAGNOSIS:  Screening colonoscopy examination, personal history colon polyps     POSTOPERATIVE DIAGNOSIS:  See impression.      SURGEON:  Yessenia Gilbert medications for next 10 days to prevent bleeding

## 2018-01-26 ENCOUNTER — OFFICE VISIT (OUTPATIENT)
Dept: INTERNAL MEDICINE CLINIC | Facility: CLINIC | Age: 73
End: 2018-01-26

## 2018-01-26 VITALS
WEIGHT: 162 LBS | BODY MASS INDEX: 30.58 KG/M2 | DIASTOLIC BLOOD PRESSURE: 70 MMHG | SYSTOLIC BLOOD PRESSURE: 131 MMHG | HEIGHT: 61 IN | RESPIRATION RATE: 18 BRPM | HEART RATE: 65 BPM

## 2018-01-26 DIAGNOSIS — E78.5 HYPERLIPIDEMIA, UNSPECIFIED HYPERLIPIDEMIA TYPE: ICD-10-CM

## 2018-01-26 DIAGNOSIS — J45.20 MILD INTERMITTENT ASTHMA WITH ALLERGIC RHINITIS WITHOUT COMPLICATION: ICD-10-CM

## 2018-01-26 DIAGNOSIS — J45.30 ASTHMA WITH ALLERGIC RHINITIS, MILD PERSISTENT, UNCOMPLICATED: ICD-10-CM

## 2018-01-26 DIAGNOSIS — H90.6 MIXED CONDUCTIVE AND SENSORINEURAL HEARING LOSS OF BOTH EARS: ICD-10-CM

## 2018-01-26 DIAGNOSIS — Z12.31 VISIT FOR SCREENING MAMMOGRAM: Primary | ICD-10-CM

## 2018-01-26 DIAGNOSIS — I10 ESSENTIAL HYPERTENSION: ICD-10-CM

## 2018-01-26 PROBLEM — K57.30 DIVERTICULOSIS OF LARGE INTESTINE: Status: ACTIVE | Noted: 2018-01-26

## 2018-01-26 PROCEDURE — 99214 OFFICE O/P EST MOD 30 MIN: CPT | Performed by: INTERNAL MEDICINE

## 2018-01-26 PROCEDURE — G0463 HOSPITAL OUTPT CLINIC VISIT: HCPCS | Performed by: INTERNAL MEDICINE

## 2018-01-26 RX ORDER — ROSUVASTATIN CALCIUM 10 MG/1
TABLET, COATED ORAL
Qty: 90 TABLET | Refills: 2 | Status: SHIPPED | OUTPATIENT
Start: 2018-01-26 | End: 2018-11-09

## 2018-01-26 RX ORDER — AMLODIPINE BESYLATE 5 MG/1
5 TABLET ORAL NIGHTLY
Qty: 90 TABLET | Refills: 3 | Status: SHIPPED | OUTPATIENT
Start: 2018-01-26 | End: 2019-01-11

## 2018-01-26 RX ORDER — BUDESONIDE AND FORMOTEROL FUMARATE DIHYDRATE 160; 4.5 UG/1; UG/1
AEROSOL RESPIRATORY (INHALATION)
Qty: 3 INHALER | Refills: 3 | Status: CANCELLED
Start: 2018-01-26

## 2018-01-26 RX ORDER — LOSARTAN POTASSIUM 100 MG/1
TABLET ORAL
Qty: 90 TABLET | Refills: 2 | Status: SHIPPED | OUTPATIENT
Start: 2018-01-26 | End: 2018-11-09

## 2018-01-26 RX ORDER — ALBUTEROL SULFATE 90 UG/1
2 AEROSOL, METERED RESPIRATORY (INHALATION) EVERY 6 HOURS PRN
Qty: 3 INHALER | Refills: 1 | Status: SHIPPED | OUTPATIENT
Start: 2018-01-26 | End: 2019-01-11

## 2018-01-26 NOTE — ASSESSMENT & PLAN NOTE
Lipid panel and liver function tests have been stable on Crestor, patient has tolerated medications well.   Recheck labs prior to the next office visit in June

## 2018-01-26 NOTE — PATIENT INSTRUCTIONS
Problem List Items Addressed This Visit        Unprioritized    Asthma with allergic rhinitis     Stable asthma at this time. Has needed changes in the inhalers. Used to Brio 1 puff daily. Use albuterol as a rescue inhaler.   May discontinue all other in

## 2018-01-26 NOTE — ASSESSMENT & PLAN NOTE
Increasing problems hearing in noisy surroundings. Has to have request repeated multiple times. Hearing evaluation has been requested .

## 2018-01-26 NOTE — ASSESSMENT & PLAN NOTE
Moderate diverticulosis in the entirety of the large intestine–colon. Most severe in the ascending colon and in the sigmoid colon. Patient has been advised to take Metamucil or Citrucel on a daily basis.   Avoid seeds, nuts and other hard particulate diff

## 2018-01-26 NOTE — ASSESSMENT & PLAN NOTE
Blood pressure 131/70, pulse 65, resp. rate 18, height 5' 1\" (1.549 m), weight 162 lb (73.5 kg). Blood pressure is currently stable on amlodipine at 5 mg once daily and losartan at 100 mg once daily.   Patient has had mild aortic stenosis with a gradie

## 2018-01-26 NOTE — PROGRESS NOTES
HPI:    Patient ID: Augustus Hartman is a 67year old female. COLONOSCOPY FINDINGS:    · Moderate severity diverticulosis entire length of the colon, most significant ascending and sigmoid colon.   · Sessile 8 mm polyp removed from the hepatic flexure kath episode started more than 1 year ago. The problem has been gradually improving since onset. The problem is controlled. Pertinent negatives include no anxiety, blurred vision, peripheral edema or shortness of breath.  There are no associated agents to hypert Prescriptions:  losartan 100 MG Oral Tab 1 TAB PO QD Disp: 90 tablet Rfl: 2   Rosuvastatin Calcium (CRESTOR) 10 MG Oral Tab 1 TAB PO QD Disp: 90 tablet Rfl: 2   AmLODIPine Besylate 5 MG Oral Tab Take 1 tablet (5 mg total) by mouth nightly.  Disp: 90 tablet normal.   Nose: Nose normal.   Mouth/Throat: Oropharynx is clear and moist. No oropharyngeal exudate. Eyes: Conjunctivae and EOM are normal. Pupils are equal, round, and reactive to light. Right eye exhibits no discharge. Left eye exhibits no discharge. DIFFERENTIAL WITH PLATELET    COMP METABOLIC PANEL (14)    LIPID PANEL    Hypertension     Blood pressure 131/70, pulse 65, resp. rate 18, height 5' 1\" (1.549 m), weight 162 lb (73.5 kg).      Blood pressure is currently stable on amlodipine at 5 mg once d Wheezing.  Use as rescue inhaler           Imaging & Referrals:  [facility] SCREENING BILAT (CPT=77067)       XD#[id number]

## 2018-01-26 NOTE — ASSESSMENT & PLAN NOTE
Stable asthma at this time. Has needed changes in the inhalers. Used to Brio 1 puff daily. Use albuterol as a rescue inhaler. May discontinue all other inhalers. Asthma action plan provided. Refills on medications have been sent in.

## 2018-02-12 ENCOUNTER — TELEPHONE (OUTPATIENT)
Dept: GASTROENTEROLOGY | Facility: CLINIC | Age: 73
End: 2018-02-12

## 2018-02-12 NOTE — TELEPHONE ENCOUNTER
Message   Received: Yesterday   Message Contents   Lyudmila Owen MD  [facility] Clinical Staff             GI RNs - 1. Please print and mail this letter to patient; 2. Recall for colonoscopy exam in 5 years      Results letter mailed to patient.  C

## 2018-05-07 ENCOUNTER — LAB ENCOUNTER (OUTPATIENT)
Dept: LAB | Age: 73
End: 2018-05-07
Attending: INTERNAL MEDICINE
Payer: MEDICARE

## 2018-05-07 ENCOUNTER — HOSPITAL ENCOUNTER (OUTPATIENT)
Dept: MAMMOGRAPHY | Age: 73
Discharge: HOME | End: 2018-05-07
Attending: INTERNAL MEDICINE
Payer: MEDICARE

## 2018-05-07 DIAGNOSIS — I10 ESSENTIAL HYPERTENSION: ICD-10-CM

## 2018-05-07 DIAGNOSIS — E78.5 HYPERLIPIDEMIA, UNSPECIFIED HYPERLIPIDEMIA TYPE: ICD-10-CM

## 2018-05-07 DIAGNOSIS — Z12.31 VISIT FOR SCREENING MAMMOGRAM: ICD-10-CM

## 2018-05-07 PROCEDURE — 77067 SCR MAMMO BI INCL CAD: CPT | Performed by: INTERNAL MEDICINE

## 2018-05-07 PROCEDURE — 36415 COLL VENOUS BLD VENIPUNCTURE: CPT

## 2018-05-07 PROCEDURE — 81001 URINALYSIS AUTO W/SCOPE: CPT

## 2018-05-07 PROCEDURE — 84443 ASSAY THYROID STIM HORMONE: CPT

## 2018-05-07 PROCEDURE — 80061 LIPID PANEL: CPT

## 2018-05-07 PROCEDURE — 85025 COMPLETE CBC W/AUTO DIFF WBC: CPT

## 2018-05-07 PROCEDURE — 80053 COMPREHEN METABOLIC PANEL: CPT

## 2018-05-31 ENCOUNTER — OFFICE VISIT (OUTPATIENT)
Dept: INTERNAL MEDICINE CLINIC | Facility: CLINIC | Age: 73
End: 2018-05-31

## 2018-05-31 VITALS
BODY MASS INDEX: 30.27 KG/M2 | OXYGEN SATURATION: 97 % | TEMPERATURE: 98 F | HEIGHT: 61 IN | RESPIRATION RATE: 18 BRPM | HEART RATE: 65 BPM | WEIGHT: 160.31 LBS | SYSTOLIC BLOOD PRESSURE: 138 MMHG | DIASTOLIC BLOOD PRESSURE: 78 MMHG

## 2018-05-31 DIAGNOSIS — I25.10 ATHEROSCLEROSIS OF NATIVE CORONARY ARTERY OF NATIVE HEART WITHOUT ANGINA PECTORIS: ICD-10-CM

## 2018-05-31 DIAGNOSIS — J45.20 MILD INTERMITTENT ASTHMA WITH ALLERGIC RHINITIS WITHOUT COMPLICATION: ICD-10-CM

## 2018-05-31 DIAGNOSIS — I11.9 LEFT VENTRICULAR HYPERTROPHY DUE TO HYPERTENSIVE DISEASE, WITHOUT HEART FAILURE: ICD-10-CM

## 2018-05-31 DIAGNOSIS — K57.30 DIVERTICULOSIS OF LARGE INTESTINE WITHOUT HEMORRHAGE: ICD-10-CM

## 2018-05-31 DIAGNOSIS — I77.1 STENOSIS OF CELIAC ARTERY (HCC): ICD-10-CM

## 2018-05-31 DIAGNOSIS — I10 ESSENTIAL HYPERTENSION: ICD-10-CM

## 2018-05-31 DIAGNOSIS — H90.6 MIXED CONDUCTIVE AND SENSORINEURAL HEARING LOSS OF BOTH EARS: Primary | ICD-10-CM

## 2018-05-31 DIAGNOSIS — D69.2 SENILE PURPURA (HCC): ICD-10-CM

## 2018-05-31 DIAGNOSIS — Z00.00 ENCOUNTER FOR ANNUAL HEALTH EXAMINATION: ICD-10-CM

## 2018-05-31 DIAGNOSIS — I70.0 ATHEROSCLEROSIS OF ABDOMINAL AORTA (HCC): ICD-10-CM

## 2018-05-31 DIAGNOSIS — E78.5 HYPERLIPIDEMIA, UNSPECIFIED HYPERLIPIDEMIA TYPE: ICD-10-CM

## 2018-05-31 DIAGNOSIS — I35.0 NONRHEUMATIC AORTIC VALVE STENOSIS: ICD-10-CM

## 2018-05-31 DIAGNOSIS — K80.20 CALCULUS OF GALLBLADDER WITHOUT CHOLECYSTITIS WITHOUT OBSTRUCTION: ICD-10-CM

## 2018-05-31 DIAGNOSIS — Z13.39 SCREENING FOR ALCOHOL PROBLEM: ICD-10-CM

## 2018-05-31 DIAGNOSIS — Z00.00 MEDICARE ANNUAL WELLNESS VISIT, INITIAL: ICD-10-CM

## 2018-05-31 PROBLEM — I77.4 STENOSIS OF CELIAC ARTERY: Status: ACTIVE | Noted: 2018-05-31

## 2018-05-31 PROBLEM — I77.4 STENOSIS OF CELIAC ARTERY (HCC): Status: ACTIVE | Noted: 2018-05-31

## 2018-05-31 PROBLEM — R14.0 ABDOMINAL DISTENSION: Status: RESOLVED | Noted: 2017-08-01 | Resolved: 2018-05-31

## 2018-05-31 PROCEDURE — G0438 PPPS, INITIAL VISIT: HCPCS | Performed by: INTERNAL MEDICINE

## 2018-05-31 PROCEDURE — G0442 ANNUAL ALCOHOL SCREEN 15 MIN: HCPCS | Performed by: INTERNAL MEDICINE

## 2018-05-31 PROCEDURE — 99497 ADVNCD CARE PLAN 30 MIN: CPT | Performed by: INTERNAL MEDICINE

## 2018-05-31 RX ORDER — LEVALBUTEROL TARTRATE 45 UG/1
1 AEROSOL, METERED ORAL EVERY 4 HOURS PRN
COMMUNITY
End: 2019-01-11

## 2018-05-31 RX ORDER — BUDESONIDE AND FORMOTEROL FUMARATE DIHYDRATE 160; 4.5 UG/1; UG/1
2 AEROSOL RESPIRATORY (INHALATION) DAILY
COMMUNITY
End: 2019-01-11

## 2018-05-31 NOTE — ASSESSMENT & PLAN NOTE
Benign bruising on arms and lower extremity. No intervention needed. Clinical counts look normal.  Patient is on aspirin at 81 mg daily. She does not have any other forms of ecchymosis or bleeding.

## 2018-05-31 NOTE — ASSESSMENT & PLAN NOTE
Lipid panel and liver function test completed recently looked great. Patient has been stable on Crestor which he is tolerated well. Continue the same dose of medications.

## 2018-05-31 NOTE — ASSESSMENT & PLAN NOTE
Normal exam.  Labs as ordered. Skin check normal.  No significant abnormal nevi. Breast exam completed–no palpable abnormalities, discharge from the nipples or axillary adenopathy. Mammogram due on 05/07/2019  DEXA scan due next year.   No cervical or in

## 2018-05-31 NOTE — ASSESSMENT & PLAN NOTE
Asymptomatic atherosclerosis of the abdominal aorta without aneurysm or dilation. Blood pressures are stable. Cholesterols are well controlled. Continue to monitor.

## 2018-05-31 NOTE — ASSESSMENT & PLAN NOTE
Blood pressure 138/78, pulse 65, temperature 98.3 °F (36.8 °C), temperature source Oral, resp. rate 18, height 5' 1\" (1.549 m), weight 160 lb 4.8 oz (72.7 kg), SpO2 97 %, not currently breastfeeding.   Stable blood pressure, well controlled at this time on

## 2018-05-31 NOTE — ASSESSMENT & PLAN NOTE
Asymptomatic moderate diverticulosis in the entirety of the large intestine. Patient has remained asymptomatic.   She is advised to take Metamucil and Citrucel on a regular basis and avoid nuts, seeds and other hard particulate difficult to digest substanc

## 2018-05-31 NOTE — PATIENT INSTRUCTIONS
Problem List Items Addressed This Visit        Unprioritized    Aortic stenosis    Asthma with allergic rhinitis    Relevant Medications    Budesonide-Formoterol Fumarate (SYMBICORT) 160-4.5 MCG/ACT Inhalation Aerosol    Atherosclerosis of abdominal aorta aortic stenosis with a gradient of about 11. Follow-up echocardiogram in about a year.          Relevant Orders    CBC WITH DIFFERENTIAL WITH PLATELET    COMP METABOLIC PANEL (14)    LIPID PANEL    ASSAY, THYROID STIM HORMONE    URINALYSIS, ROUTINE    Left look normal.  Patient is on aspirin at 81 mg daily. She does not have any other forms of ecchymosis or bleeding. 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   Bernett Scheuermann Cholesterol (mg/dL)   Date Value   05/07/2018 71   03/16/2016 78     CHOLESTEROL, TOTAL (mg/dL)   Date Value   10/25/2014 172     Cholesterol, Total (mg/dL)   Date Value   05/07/2018 151   03/16/2016 158     TRIGLYCERIDES (mg/dL)   Date Value   10/25/2014 use for medical condition    Last Dexa Scan:   XR DEXA BONE DENSITOMETRY (CPT=77080) 01/20/2017    No flowsheet data found.     Recommended for 2 year anniversary or as ordered in After Visit Summary   Pap and Pelvic      Pap: Every 3 yrs age 21-65 or Pap+H Directives    [email or web address] org/publications/[email or web address] pdf  An information packet, including necessary form from the [employer] website. http://[email or web address] [place]state. [place]/public/books/advin.htm  A link to the [facility]

## 2018-05-31 NOTE — PROGRESS NOTES
HPI:   Cathleen Ramos is a 67year old female who presents for a Medicare Initial Annual Wellness visit (Once after 12 month Medicare anniversary) .     Annual Physical due on 08/01/2018        Fall/Risk Assessment   She has been screened for Falls and is Problem List:     Hypertension     Hyperlipidemia     Asthma with allergic rhinitis     Menopause     Atherosclerosis of coronary artery     Aortic stenosis     Dense breast tissue on mammogram     Medicare annual wellness visit, initial     Calculus of ga Inhalation Aerosol Powder, Breath Activated Inhale 1 puff into the lungs daily. ipratropium-albuterol (DUONEB) 0.5-2.5 (3) MG/3ML Inhalation Solution Use once a day if needed   Fexofenadine HCl (ALLEGRA) 180 MG Oral Tab Take 180 mg by mouth daily.    Chol She reports that she does not drink alcohol or use drugs.      REVIEW OF SYSTEMS:   Review of Systems   GENERAL: feels well otherwise  SKIN: denies any unusual skin lesions  EYES: denies blurred vision or double vision  HEENT: denies nasal congestion, sinus such as at a meeting or place of [employer]:  Sometimes   Many people I talk to seem to mumble (or don't speak clearly):  Sometimes People get annoyed because I misunderstand what they say:  Sometimes   I misunderstand what others are saying and make inapprop Neurological: She is alert and oriented to person, place, and time. She displays normal reflexes. No cranial nerve deficit, sensory deficit or motor deficit. Coordination and gait normal.   Skin: Skin is warm and dry. No erythema. No pallor.  Does not exh function tests have remained stable. Diverticulosis of large intestine     Asymptomatic moderate diverticulosis in the entirety of the large intestine. Patient has remained asymptomatic.   She is advised to take Metamucil and Citrucel on a regular follow-up with cardiology for an evaluation–[phone] for an appointment. Eye exam completed per optometry. No significant abnormalities noted. Hernial orifices intact. Rectal exam normal,no palpable abnormalities.   Hemorrhoids-internal as well as ext asthma with allergic rhinitis without complication    Essential hypertension  -     CBC WITH DIFFERENTIAL WITH PLATELET; Future  -     COMP METABOLIC PANEL (14); Future  -     LIPID PANEL;  Future  -     ASSAY, THYROID STIM HORMONE; Future  -     URINALYSIS ----------  GLUCOSE (mg/dL)   Date Value   10/25/2014 98   ----------  GLUCOSE (P) (mg/dL)   Date Value   03/16/2016 97   ----------       Cardiovascular Disease Screening     LDL Annually LDL-CHOLESTEROL (mg/dL (calc))   Date Value   10/25/2014 82     L End-stage renal disease   Hemophiliacs who received Factor VIII or IX concentrates   Clients of institutions for the mentally retarded   Persons who live in the same house as a HepB virus carrier   Homosexual men   Illicit injectable drug abusers     Tet

## 2018-05-31 NOTE — ASSESSMENT & PLAN NOTE
Incidentally noted calcified plaque in the LAD. Patient has been asymptomatic, she does have a family history of heart disease and has had a stress thallium completed which was normal.  We will continue to monitor.

## 2018-05-31 NOTE — ASSESSMENT & PLAN NOTE
Incidentally noted asymptomatic calcified gallstone in the gallbladder neck. She has been evaluated per gastroenterology. Liver function tests have remained stable.

## 2018-05-31 NOTE — ASSESSMENT & PLAN NOTE
Last 2D echo done earlier this year with grade 1 diastolic dysfunction. Mild aortic stenosis. We will continue to monitor. Blood pressures are well controlled at this time.

## 2018-05-31 NOTE — ASSESSMENT & PLAN NOTE
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 There is calcification around the area too. Will monitor at this time for any symptoms of abdominal pain or weight loss.

## 2018-05-31 NOTE — ASSESSMENT & PLAN NOTE
Mild asymptomatic aortic stenosis with a mean gradient of about 11. Continue to monitor. No intervention required.

## 2018-09-05 NOTE — TELEPHONE ENCOUNTER
From: Dougie Ortiz  Sent: 9/5/2018 11:29 AM CDT  Subject: Medication Renewal Request    Dougie Ortiz would like a refill of the following medications:     Fluticasone Furoate-Vilanterol (BREO ELLIPTA) 200-25 MCG/INH Inhalation Aerosol Powder, Breath A

## 2018-09-05 NOTE — TELEPHONE ENCOUNTER
Please advise on refill request.    Refill Protocol Appointment Criteria  · Appointment scheduled in the past 6 months or in the next 3 months  Recent Outpatient Visits            3 months ago Mixed conductive and sensorineural hearing loss of both ears

## 2018-11-07 DIAGNOSIS — I10 ESSENTIAL HYPERTENSION: ICD-10-CM

## 2018-11-07 DIAGNOSIS — E78.5 HYPERLIPIDEMIA, UNSPECIFIED HYPERLIPIDEMIA TYPE: ICD-10-CM

## 2018-11-07 NOTE — TELEPHONE ENCOUNTER
Mail order is requesting refills    Ref # [id number]    losartan 100 MG Oral Tab 1 TAB PO QD Disp: 90 tablet Rfl: 2   Rosuvastatin Calcium (CRESTOR) 10 MG Oral Tab 1 TAB PO QD Disp: 90 tablet Rfl: 2

## 2018-11-09 RX ORDER — LOSARTAN POTASSIUM 100 MG/1
TABLET ORAL
Qty: 90 TABLET | Refills: 2 | Status: SHIPPED | OUTPATIENT
Start: 2018-11-09 | End: 2019-01-11

## 2018-11-09 RX ORDER — ROSUVASTATIN CALCIUM 10 MG/1
TABLET, COATED ORAL
Qty: 90 TABLET | Refills: 2 | Status: SHIPPED | OUTPATIENT
Start: 2018-11-09 | End: 2019-01-11

## 2018-11-09 NOTE — TELEPHONE ENCOUNTER
Cholesterol Medications  Protocol Criteria:  · Appointment scheduled in the past 12 months or in the next 3 months  · ALT & LDL on file in the past 12 months  · ALT result < 80  · LDL result <130   Recent Outpatient Visits            5 months ago Mixed con Appointments       Provider Department Appt Notes    In 3 weeks Vale Montes De Oca MD [facility], [address] 6 month         Lab Results   Component Value Date     (H) 05/07/2018    BUN 16 05/07/2018    CREATSERUM 0.69 05/07/2018    BUNCRE

## 2018-11-20 ENCOUNTER — LAB ENCOUNTER (OUTPATIENT)
Dept: LAB | Age: 73
End: 2018-11-20
Attending: INTERNAL MEDICINE
Payer: MEDICARE

## 2018-11-20 DIAGNOSIS — I10 ESSENTIAL HYPERTENSION: ICD-10-CM

## 2018-11-20 PROCEDURE — 36415 COLL VENOUS BLD VENIPUNCTURE: CPT

## 2018-11-20 PROCEDURE — 80061 LIPID PANEL: CPT

## 2018-11-20 PROCEDURE — 85025 COMPLETE CBC W/AUTO DIFF WBC: CPT

## 2018-11-20 PROCEDURE — 84443 ASSAY THYROID STIM HORMONE: CPT

## 2018-11-20 PROCEDURE — 80053 COMPREHEN METABOLIC PANEL: CPT

## 2018-11-20 PROCEDURE — 81001 URINALYSIS AUTO W/SCOPE: CPT

## 2019-01-11 ENCOUNTER — OFFICE VISIT (OUTPATIENT)
Dept: INTERNAL MEDICINE CLINIC | Facility: CLINIC | Age: 74
End: 2019-01-11
Payer: MEDICARE

## 2019-01-11 ENCOUNTER — TELEPHONE (OUTPATIENT)
Dept: INTERNAL MEDICINE CLINIC | Facility: CLINIC | Age: 74
End: 2019-01-11

## 2019-01-11 VITALS
HEART RATE: 62 BPM | WEIGHT: 162 LBS | RESPIRATION RATE: 16 BRPM | DIASTOLIC BLOOD PRESSURE: 70 MMHG | HEIGHT: 61 IN | BODY MASS INDEX: 30.58 KG/M2 | SYSTOLIC BLOOD PRESSURE: 136 MMHG

## 2019-01-11 DIAGNOSIS — I10 ESSENTIAL HYPERTENSION: ICD-10-CM

## 2019-01-11 DIAGNOSIS — R07.89 LEFT-SIDED CHEST WALL PAIN: Primary | ICD-10-CM

## 2019-01-11 DIAGNOSIS — R10.9 LEFT FLANK PAIN: ICD-10-CM

## 2019-01-11 DIAGNOSIS — I35.0 NONRHEUMATIC AORTIC VALVE STENOSIS: ICD-10-CM

## 2019-01-11 DIAGNOSIS — E78.5 HYPERLIPIDEMIA, UNSPECIFIED HYPERLIPIDEMIA TYPE: ICD-10-CM

## 2019-01-11 PROCEDURE — G0463 HOSPITAL OUTPT CLINIC VISIT: HCPCS | Performed by: INTERNAL MEDICINE

## 2019-01-11 PROCEDURE — 99214 OFFICE O/P EST MOD 30 MIN: CPT | Performed by: INTERNAL MEDICINE

## 2019-01-11 RX ORDER — AMLODIPINE BESYLATE 5 MG/1
5 TABLET ORAL NIGHTLY
Qty: 90 TABLET | Refills: 3 | Status: SHIPPED | OUTPATIENT
Start: 2019-01-11 | End: 2019-08-30

## 2019-01-11 RX ORDER — LOSARTAN POTASSIUM 100 MG/1
TABLET ORAL
Qty: 90 TABLET | Refills: 2 | Status: SHIPPED | OUTPATIENT
Start: 2019-01-11 | End: 2019-10-24

## 2019-01-11 RX ORDER — ROSUVASTATIN CALCIUM 10 MG/1
TABLET, COATED ORAL
Qty: 90 TABLET | Refills: 2 | Status: SHIPPED | OUTPATIENT
Start: 2019-01-11 | End: 2019-10-24

## 2019-01-11 RX ORDER — LEVALBUTEROL TARTRATE 45 UG/1
1 AEROSOL, METERED ORAL EVERY 4 HOURS PRN
Qty: 3 INHALER | Refills: 2 | Status: SHIPPED | OUTPATIENT
Start: 2019-01-11

## 2019-01-11 NOTE — ASSESSMENT & PLAN NOTE
Constant left chest wall pain with reproducible pain on palpation along the ribs extending along the lower 8/9 and 10th ribs into the back. Symptoms remain constant and has been so for the past 1 month.   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

## 2019-01-11 NOTE — TELEPHONE ENCOUNTER
Pt states  was suppose to send new prescript for meds CELEBREX TO BE REFILL AT [facility] [place] [address]

## 2019-01-11 NOTE — PROGRESS NOTES
HPI:    Patient ID: Zohaib Pyle is a 75year old female. Last ct abdomen and pelvis    CONCLUSION:   1. No urolithiasis or renal obstruction. 2. Colonic diverticulosis.   3. A 1.4 cm exophytic gastric fundal soft tissue nodule unchanged from January pain, headaches, neck pain, orthopnea, PND, sore throat or swollen glands. The symptoms are aggravated by animal exposure, weather changes, URIs, odors, pollens and exercise. She has tried beta agonist inhalers and steroid inhalers for the symptoms.  The ana Negative. Negative for abdominal pain. Endocrine: Negative. Genitourinary: Negative. Musculoskeletal: Negative. Negative for arthralgias and neck pain. Skin: Negative. Allergic/Immunologic: Negative. Neurological: Negative.   Negative for Physical Exam   Constitutional: She is oriented to person, place, and time. She appears well-developed and well-nourished.    HENT:   Right Ear: External ear normal.   Left Ear: External ear normal.   Nose: Nose normal.   Mouth/Throat: Oropharynx is clear at 5 mg daily. Recent labs looked stable.            Relevant Medications    AmLODIPine Besylate 5 MG Oral Tab    losartan 100 MG Oral Tab    Hyperlipidemia     Lipid panel and liver function tests have been stable on Crestor 10 mg daily which she has tole of urolithiasis in the past.  Urine test last done in November did not show any significant abnormalities. She has not noticed any blood in her urine at this time. CT kidney stone protocol has been requested and will follow-up after completion.          R

## 2019-01-11 NOTE — ASSESSMENT & PLAN NOTE
Intermittent episodes of sharp left flank pain. Patient has had at least 8 episodes of urolithiasis in the past.  Urine test last done in November did not show any significant abnormalities. She has not noticed any blood in her urine at this time.   CT ki

## 2019-01-11 NOTE — ASSESSMENT & PLAN NOTE
Mild aortic stenosis with a mean gradient of 11. Chronic hypertension. Seems well controlled at this time and is asymptomatic but will need a follow-up echocardiogram which has been ordered.   Patient has been advised to return for follow-up in about 6-8

## 2019-01-11 NOTE — PATIENT INSTRUCTIONS
Problem List Items Addressed This Visit        Unprioritized    Aortic stenosis     Mild aortic stenosis with a mean gradient of 11. Chronic hypertension.   Seems well controlled at this time and is asymptomatic but will need a follow-up echocardiogram [facility] month. 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 X-rays of the thoracic spine as well as the ribs have been provided to rule out possibility for fracture. Follow-up after this is completed.     Last CT abdomen and p

## 2019-01-11 NOTE — ASSESSMENT & PLAN NOTE
Lipid panel and liver function tests have been stable on Crestor 10 mg daily which she has tolerated well. Recheck labs prior to the next office visit.

## 2019-01-11 NOTE — ASSESSMENT & PLAN NOTE
Blood pressure 136/70, pulse 62, resp. rate 16, height 5' 1\" (1.549 m), weight 162 lb (73.5 kg), not currently breastfeeding. Blood pressure initially elevated upon arrival.  Upon recheck had improved.   Patient does have a history of grade 1 diastolic

## 2019-01-12 RX ORDER — CELECOXIB 200 MG/1
200 CAPSULE ORAL DAILY
Qty: 30 CAPSULE | Refills: 3 | Status: SHIPPED | OUTPATIENT
Start: 2019-01-12 | End: 2019-05-24

## 2019-01-14 ENCOUNTER — HOSPITAL ENCOUNTER (OUTPATIENT)
Dept: GENERAL RADIOLOGY | Age: 74
Discharge: HOME | End: 2019-01-14
Attending: INTERNAL MEDICINE
Payer: MEDICARE

## 2019-01-14 ENCOUNTER — HOSPITAL ENCOUNTER (OUTPATIENT)
Dept: CARDIOLOGY CLINIC | Age: 74
Discharge: HOME | End: 2019-01-14
Attending: INTERNAL MEDICINE
Payer: MEDICARE

## 2019-01-14 DIAGNOSIS — R07.89 LEFT-SIDED CHEST WALL PAIN: ICD-10-CM

## 2019-01-14 DIAGNOSIS — I35.0 NONRHEUMATIC AORTIC VALVE STENOSIS: ICD-10-CM

## 2019-01-14 PROCEDURE — 93306 TTE W/DOPPLER COMPLETE: CPT | Performed by: INTERNAL MEDICINE

## 2019-01-14 PROCEDURE — 72070 X-RAY EXAM THORAC SPINE 2VWS: CPT | Performed by: INTERNAL MEDICINE

## 2019-01-14 PROCEDURE — 71101 X-RAY EXAM UNILAT RIBS/CHEST: CPT | Performed by: INTERNAL MEDICINE

## 2019-01-18 ENCOUNTER — HOSPITAL ENCOUNTER (OUTPATIENT)
Dept: CT IMAGING | Facility: HOSPITAL | Age: 74
Discharge: HOME | End: 2019-01-18
Attending: INTERNAL MEDICINE
Payer: MEDICARE

## 2019-01-18 DIAGNOSIS — R10.9 LEFT FLANK PAIN: ICD-10-CM

## 2019-01-18 PROCEDURE — 74176 CT ABD & PELVIS W/O CONTRAST: CPT | Performed by: INTERNAL MEDICINE

## 2019-02-16 NOTE — TELEPHONE ENCOUNTER
Recent Visits  Date Type Provider Dept   01/11/19 Office Visit Alyson Huber MD [facility]-Internal Med   05/31/18 Office Visit MD [facility]-Internal Med   01/26/18 Office Visit MD [facility] Gong-Internal Med   09/29/17 Office Visit Galilea Garcia

## 2019-04-24 ENCOUNTER — OFFICE VISIT (OUTPATIENT)
Dept: INTERNAL MEDICINE CLINIC | Facility: CLINIC | Age: 74
End: 2019-04-24
Payer: MEDICARE

## 2019-04-24 VITALS
RESPIRATION RATE: 18 BRPM | WEIGHT: 160.31 LBS | OXYGEN SATURATION: 96 % | DIASTOLIC BLOOD PRESSURE: 73 MMHG | BODY MASS INDEX: 30.27 KG/M2 | SYSTOLIC BLOOD PRESSURE: 159 MMHG | HEART RATE: 66 BPM | TEMPERATURE: 98 F | HEIGHT: 61 IN

## 2019-04-24 DIAGNOSIS — E78.5 HYPERLIPIDEMIA, UNSPECIFIED HYPERLIPIDEMIA TYPE: ICD-10-CM

## 2019-04-24 DIAGNOSIS — I77.1 STENOSIS OF CELIAC ARTERY (HCC): ICD-10-CM

## 2019-04-24 DIAGNOSIS — I10 ESSENTIAL HYPERTENSION: ICD-10-CM

## 2019-04-24 DIAGNOSIS — R07.2 PRECORDIAL CHEST PAIN: ICD-10-CM

## 2019-04-24 DIAGNOSIS — R09.89 LEFT CAROTID BRUIT: Primary | ICD-10-CM

## 2019-04-24 DIAGNOSIS — I77.4 MEDIAN ARCUATE LIGAMENT SYNDROME (HCC): ICD-10-CM

## 2019-04-24 PROCEDURE — 99215 OFFICE O/P EST HI 40 MIN: CPT | Performed by: INTERNAL MEDICINE

## 2019-04-24 PROCEDURE — G0463 HOSPITAL OUTPT CLINIC VISIT: HCPCS | Performed by: INTERNAL MEDICINE

## 2019-04-24 NOTE — ASSESSMENT & PLAN NOTE
Recurrent episodes of persistent left chest pain. She did have some reproducible pain in the past.  Pain originates in the lower chest rule out the possibility of and radiates up into the neck and upper back.   Will need to rule out dissection at this time

## 2019-04-24 NOTE — ASSESSMENT & PLAN NOTE
Patient has been on Crestor 10 mg daily which she has tolerated well. Recheck labs have been ordered.

## 2019-04-24 NOTE — ASSESSMENT & PLAN NOTE
Blood pressure 159/73, pulse 66, temperature 98 °F (36.7 °C), temperature source Oral, resp. rate 18, height 5' 1\" (1.549 m), weight 160 lb 4.8 oz (72.7 kg), SpO2 96 %, not currently breastfeeding.   Blood pressure is borderline elevated but home blood pre

## 2019-04-24 NOTE — PROGRESS NOTES
HPI:    Patient ID: Brenda Monae is a 75year old female.     Result Notes for CT ABDOMEN+PELVIS KIDNEYSTONE 2D RNDR(NO IV,NO ORAL)(CPT=74176)     Notes recorded by David Amaro MD on 1/18/2019 at 7:54 PM CST  CAT scan of the abdomen does not show any s 2015    Problem List Items Addressed This Visit     Hypertension     Hyperlipidemia     Other Visit Diagnoses    Mild aortic stenosis    -  Primary             Continue current medications. Follow-up with me in 12 months or sooner if needed.   We'll plan o lesion off the greater curvature of the stomach suggestive of a leiomyoma. If clinically indicated this could be further evaluated with direct visualization.      Mild wall thickening of the distal esophagus at the gastroesophageal junction may be related t by mouth daily. Disp: 30 capsule Rfl: 3   AmLODIPine Besylate 5 MG Oral Tab Take 1 tablet (5 mg total) by mouth nightly.  Disp: 90 tablet Rfl: 3   Levalbuterol Tartrate 45 MCG/ACT Inhalation Aerosol Inhale 1 puff into the lungs every 4 (four) hours as neede rhythm, normal heart sounds and intact distal pulses. No murmur heard. Pulmonary/Chest: Effort normal and breath sounds normal. She has no wheezes. She has no rales. She exhibits no tenderness. Abdominal: Soft.  Bowel sounds are normal. She exhibits no been having some chronic abdominal pain radiating up into the neck. Gastroenterology as well as cardiology evaluation has been obtained. Will follow-up after this is completed for further management.          Relevant Orders    CARDIO - INTERNAL    GASTRO

## 2019-04-24 NOTE — ASSESSMENT & PLAN NOTE
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 She does have some calcification noted in this area. She has been having some chronic abdominal pain radiating up into the neck.   Gastroenterology as well as cardiology ev

## 2019-04-24 NOTE — PATIENT INSTRUCTIONS
Problem List Items Addressed This Visit        Unprioritized    Hyperlipidemia     Patient has been on Crestor 10 mg daily which she has tolerated well. Recheck labs have been ordered.          Hypertension     Blood pressure 159/73, pulse 66, temperature ligament syndrome per last CAT scan. She does have some calcification noted in this area. She has been having some chronic abdominal pain radiating up into the neck. Gastroenterology as well as cardiology evaluation has been obtained.   Will follow-up af

## 2019-04-30 ENCOUNTER — HOSPITAL ENCOUNTER (OUTPATIENT)
Dept: CT IMAGING | Facility: HOSPITAL | Age: 74
Discharge: HOME | End: 2019-04-30
Attending: INTERNAL MEDICINE
Payer: MEDICARE

## 2019-04-30 ENCOUNTER — HOSPITAL ENCOUNTER (OUTPATIENT)
Dept: ULTRASOUND IMAGING | Facility: HOSPITAL | Age: 74
Discharge: HOME | End: 2019-04-30
Attending: INTERNAL MEDICINE
Payer: MEDICARE

## 2019-04-30 DIAGNOSIS — R07.2 PRECORDIAL CHEST PAIN: ICD-10-CM

## 2019-04-30 DIAGNOSIS — R09.89 LEFT CAROTID BRUIT: ICD-10-CM

## 2019-04-30 PROCEDURE — 82565 ASSAY OF CREATININE: CPT

## 2019-04-30 PROCEDURE — 93880 EXTRACRANIAL BILAT STUDY: CPT | Performed by: INTERNAL MEDICINE

## 2019-04-30 PROCEDURE — 71260 CT THORAX DX C+: CPT | Performed by: INTERNAL MEDICINE

## 2019-05-17 ENCOUNTER — TELEPHONE (OUTPATIENT)
Dept: INTERNAL MEDICINE CLINIC | Facility: CLINIC | Age: 74
End: 2019-05-17

## 2019-05-17 NOTE — TELEPHONE ENCOUNTER
Refill Protocol Appointment Criteria  · Appointment scheduled in the past 6 months or in the next 3 months  Recent Outpatient Visits            3 weeks ago Left carotid bruit    Rain Toussaint MD    Office Visit    4

## 2019-05-20 ENCOUNTER — HOSPITAL ENCOUNTER (OUTPATIENT)
Dept: MAMMOGRAPHY | Age: 74
Discharge: HOME | End: 2019-05-20
Attending: INTERNAL MEDICINE
Payer: MEDICARE

## 2019-05-20 ENCOUNTER — LAB ENCOUNTER (OUTPATIENT)
Dept: LAB | Age: 74
End: 2019-05-20
Attending: INTERNAL MEDICINE
Payer: MEDICARE

## 2019-05-20 DIAGNOSIS — I10 ESSENTIAL HYPERTENSION: ICD-10-CM

## 2019-05-20 DIAGNOSIS — Z12.31 VISIT FOR SCREENING MAMMOGRAM: ICD-10-CM

## 2019-05-20 PROCEDURE — 36415 COLL VENOUS BLD VENIPUNCTURE: CPT

## 2019-05-20 PROCEDURE — 80061 LIPID PANEL: CPT

## 2019-05-20 PROCEDURE — 84443 ASSAY THYROID STIM HORMONE: CPT

## 2019-05-20 PROCEDURE — 80053 COMPREHEN METABOLIC PANEL: CPT

## 2019-05-20 PROCEDURE — 77067 SCR MAMMO BI INCL CAD: CPT | Performed by: INTERNAL MEDICINE

## 2019-05-20 PROCEDURE — 77063 BREAST TOMOSYNTHESIS BI: CPT | Performed by: INTERNAL MEDICINE

## 2019-05-20 PROCEDURE — 85025 COMPLETE CBC W/AUTO DIFF WBC: CPT

## 2019-05-20 PROCEDURE — 81001 URINALYSIS AUTO W/SCOPE: CPT

## 2019-05-24 ENCOUNTER — OFFICE VISIT (OUTPATIENT)
Dept: INTERNAL MEDICINE CLINIC | Facility: CLINIC | Age: 74
End: 2019-05-24
Payer: MEDICARE

## 2019-05-24 VITALS
HEART RATE: 71 BPM | RESPIRATION RATE: 16 BRPM | WEIGHT: 156 LBS | DIASTOLIC BLOOD PRESSURE: 73 MMHG | HEIGHT: 61 IN | BODY MASS INDEX: 29.45 KG/M2 | SYSTOLIC BLOOD PRESSURE: 126 MMHG

## 2019-05-24 DIAGNOSIS — I10 ESSENTIAL HYPERTENSION: Primary | ICD-10-CM

## 2019-05-24 DIAGNOSIS — R09.89 LEFT CAROTID BRUIT: ICD-10-CM

## 2019-05-24 DIAGNOSIS — I77.1 STENOSIS OF CELIAC ARTERY (HCC): ICD-10-CM

## 2019-05-24 DIAGNOSIS — R07.2 PRECORDIAL CHEST PAIN: ICD-10-CM

## 2019-05-24 PROCEDURE — G0463 HOSPITAL OUTPT CLINIC VISIT: HCPCS | Performed by: INTERNAL MEDICINE

## 2019-05-24 PROCEDURE — 99214 OFFICE O/P EST MOD 30 MIN: CPT | Performed by: INTERNAL MEDICINE

## 2019-05-24 RX ORDER — PANTOPRAZOLE SODIUM 40 MG/1
40 TABLET, DELAYED RELEASE ORAL
Qty: 30 TABLET | Refills: 2 | Status: SHIPPED | OUTPATIENT
Start: 2019-05-24 | End: 2020-01-08

## 2019-05-24 NOTE — ASSESSMENT & PLAN NOTE
Patient has chronic upper abdominal pain in the epigastrium. Reproducible tenderness in the epigastric area. CT abdomen and pelvis discussed. She does have some gallstones. It does not seem to be any signs of obstruction.   Liver function test look norm

## 2019-05-24 NOTE — PROGRESS NOTES
HPI:    Patient ID: Mikhail Peng is a 75year old female. Ct chest    CT scan of the chest shows emphysema and some air trapping with scarring.   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           Rosalina Fink MD  5/20/2015  11:04 AM                  ) There are no associated agents to hypertension. Risk factors for coronary artery disease include dyslipidemia, obesity and sedentary lifestyle.  Past treatmen the symptoms. The treatment provided significant relief. There is no history of diabetes. Hyperlipidemia   This is a chronic problem. The current episode started more than 1 year ago. The problem is controlled.  Recent lipid tests were reviewed and are va Oral Tab 1 TAB PO QD Disp: 90 tablet Rfl: 2   Rosuvastatin Calcium (CRESTOR) 10 MG Oral Tab 1 TAB PO QD Disp: 90 tablet Rfl: 2   Fexofenadine HCl (ALLEGRA) 180 MG Oral Tab Take 180 mg by mouth daily.  Disp:  Rfl:    Cholecalciferol (VITAMIN D) 1000 UNITS Or Musculoskeletal: Normal range of motion. She exhibits no edema or tenderness. Lymphadenopathy:     She has no cervical adenopathy. Neurological: She is alert and oriented to person, place, and time. She has normal reflexes. No cranial nerve deficit. stenosis per echocardiogram done earlier this year. Will follow-up after cardiology evaluation is completed. Left carotid bruit     Carotid Doppler seems normal and hence this most likely is a radiating murmur from the aortic stenosis.   Will taryn

## 2019-05-24 NOTE — ASSESSMENT & PLAN NOTE
Recurrent episodes of left chest pain, epigastric pain. Gastrointestinal work-up has been completed. She has been advised to follow-up with Dr. Cruzito Davis for an evaluation and has an upcoming appointment in the next few weeks.   Carotid Doppler was negative

## 2019-05-24 NOTE — ASSESSMENT & PLAN NOTE
Blood pressure 126/73, pulse 71, resp. rate 16, height 5' 1\" (1.549 m), weight 156 lb (70.8 kg), not currently breastfeeding. Stable blood pressure, well controlled on current medications. Echocardiogram done recently discussed.   Follow-up with cardiolo

## 2019-05-24 NOTE — PATIENT INSTRUCTIONS
Problem List Items Addressed This Visit        Unprioritized    Hypertension - Primary     Blood pressure 126/73, pulse 71, resp. rate 16, height 5' 1\" (1.549 m), weight 156 lb (70.8 kg), not currently breastfeeding.   Stable blood pressure, well controlle

## 2019-05-24 NOTE — ASSESSMENT & PLAN NOTE
Carotid Doppler seems normal and hence this most likely is a radiating murmur from the aortic stenosis. Will continue to monitor .

## 2019-07-22 ENCOUNTER — HOSPITAL ENCOUNTER (OUTPATIENT)
Age: 74
Discharge: HOME | End: 2019-07-22
Attending: FAMILY MEDICINE
Payer: MEDICARE

## 2019-07-22 VITALS
SYSTOLIC BLOOD PRESSURE: 164 MMHG | HEIGHT: 61 IN | TEMPERATURE: 98 F | RESPIRATION RATE: 18 BRPM | HEART RATE: 75 BPM | OXYGEN SATURATION: 97 % | BODY MASS INDEX: 28.89 KG/M2 | DIASTOLIC BLOOD PRESSURE: 76 MMHG | WEIGHT: 153 LBS

## 2019-07-22 DIAGNOSIS — Z48.02 ENCOUNTER FOR REMOVAL OF SUTURES: Primary | ICD-10-CM

## 2019-07-22 PROCEDURE — 99201 PR OUTPT EVAL AND MGNT NEW PT LEVEL 1: CPT

## 2019-07-22 PROCEDURE — 99211 OFF/OP EST MAY X REQ PHY/QHP: CPT

## 2019-07-22 NOTE — ED PROVIDER NOTES
Patient Seen in: [facility] In [address] [place] [address]    History   Patient presents with:  Suture Removal    Stated Complaint: suture removal     HPI    Here for suture removal over the left eyebrow.   Sutures placed at [address] approxi (36.4 °C)   Temp src Oral   SpO2 97 %   O2 Device None (Room air)       Current:BP (!) 164/76   Pulse 75   Temp 97.5 °F (36.4 °C) (Oral)   Resp 18   Ht 154.9 cm (5' 1\")   Wt 69.4 kg   LMP  (LMP Unknown)   SpO2 97%   BMI 28.91 kg/m²         Physical Exam

## 2019-08-30 ENCOUNTER — OFFICE VISIT (OUTPATIENT)
Dept: INTERNAL MEDICINE CLINIC | Facility: CLINIC | Age: 74
End: 2019-08-30
Payer: MEDICARE

## 2019-08-30 VITALS
BODY MASS INDEX: 29.23 KG/M2 | WEIGHT: 154.81 LBS | HEART RATE: 69 BPM | DIASTOLIC BLOOD PRESSURE: 73 MMHG | HEIGHT: 61 IN | RESPIRATION RATE: 18 BRPM | SYSTOLIC BLOOD PRESSURE: 131 MMHG

## 2019-08-30 DIAGNOSIS — D69.2 SENILE PURPURA (HCC): ICD-10-CM

## 2019-08-30 DIAGNOSIS — J45.20 MILD INTERMITTENT ASTHMA WITH ALLERGIC RHINITIS WITHOUT COMPLICATION: ICD-10-CM

## 2019-08-30 DIAGNOSIS — H90.6 MIXED CONDUCTIVE AND SENSORINEURAL HEARING LOSS OF BOTH EARS: ICD-10-CM

## 2019-08-30 DIAGNOSIS — Z00.00 ENCOUNTER FOR ANNUAL HEALTH EXAMINATION: Primary | ICD-10-CM

## 2019-08-30 DIAGNOSIS — I10 ESSENTIAL HYPERTENSION: ICD-10-CM

## 2019-08-30 DIAGNOSIS — I77.1 STENOSIS OF CELIAC ARTERY (HCC): ICD-10-CM

## 2019-08-30 DIAGNOSIS — I70.0 ATHEROSCLEROSIS OF ABDOMINAL AORTA (HCC): ICD-10-CM

## 2019-08-30 DIAGNOSIS — Z78.0 MENOPAUSE: ICD-10-CM

## 2019-08-30 DIAGNOSIS — Z00.00 MEDICARE ANNUAL WELLNESS VISIT, SUBSEQUENT: ICD-10-CM

## 2019-08-30 DIAGNOSIS — E78.5 HYPERLIPIDEMIA, UNSPECIFIED HYPERLIPIDEMIA TYPE: ICD-10-CM

## 2019-08-30 DIAGNOSIS — K80.20 CALCULUS OF GALLBLADDER WITHOUT CHOLECYSTITIS WITHOUT OBSTRUCTION: ICD-10-CM

## 2019-08-30 DIAGNOSIS — I35.0 NONRHEUMATIC AORTIC VALVE STENOSIS: ICD-10-CM

## 2019-08-30 DIAGNOSIS — K57.30 DIVERTICULOSIS OF LARGE INTESTINE WITHOUT HEMORRHAGE: ICD-10-CM

## 2019-08-30 DIAGNOSIS — I11.9 LEFT VENTRICULAR HYPERTROPHY DUE TO HYPERTENSIVE DISEASE, WITHOUT HEART FAILURE: ICD-10-CM

## 2019-08-30 PROBLEM — R09.89 LEFT CAROTID BRUIT: Status: RESOLVED | Noted: 2019-04-24 | Resolved: 2019-08-30

## 2019-08-30 PROBLEM — R10.9 LEFT FLANK PAIN: Status: RESOLVED | Noted: 2019-01-11 | Resolved: 2019-08-30

## 2019-08-30 PROBLEM — R07.2 PRECORDIAL CHEST PAIN: Status: RESOLVED | Noted: 2019-01-11 | Resolved: 2019-08-30

## 2019-08-30 PROCEDURE — G0439 PPPS, SUBSEQ VISIT: HCPCS | Performed by: INTERNAL MEDICINE

## 2019-08-30 PROCEDURE — 99497 ADVNCD CARE PLAN 30 MIN: CPT | Performed by: INTERNAL MEDICINE

## 2019-08-30 RX ORDER — AMLODIPINE BESYLATE 5 MG/1
5 TABLET ORAL NIGHTLY
Qty: 90 TABLET | Refills: 3 | Status: SHIPPED | OUTPATIENT
Start: 2019-08-30 | End: 2020-10-17

## 2019-08-30 RX ORDER — FLUCONAZOLE 150 MG/1
150 TABLET ORAL ONCE
Qty: 1 TABLET | Refills: 0 | Status: SHIPPED | OUTPATIENT
Start: 2019-08-30 | End: 2019-08-30

## 2019-08-30 NOTE — PROGRESS NOTES
HPI:   Ji Bar is a 75year old female who presents for a Medicare Subsequent Annual Wellness visit (Pt already had Initial Annual Wellness).     Her last annual assessment has been over 1 year: Annual Physical due on 05/31/2019         Fall/Risk A elements today (up to 30 minutes for CPT [id number])         She does have a POA but we do NOT have it on file in [address] [place] [address] Rd.    Advance care planning including the explanation and discussion of advance directives standard forms performed Face to Face with patient and LDL 74 05/20/2019    TRIG 104 05/20/2019          Last Chemistry Labs:   Lab Results   Component Value Date    AST 15 05/20/2019    ALT 25 05/20/2019    CA 9.7 05/20/2019    ALB 4.1 05/20/2019    TSH 1.170 05/20/2019    CREATSERUM 0.78 05/20/2019    GLU 10 Essential hypertension (2005?), Extrinsic asthma, unspecified, Hemorrhoids (2017), High blood pressure, Hyperlipidemia (2001?), Kidney stones, Osteoarthritis, Other and unspecified hyperlipidemia, and Unspecified essential hypertension.     She  has a past kg/m²  Estimated body mass index is 29.25 kg/m² as calculated from the following:    Height as of this encounter: 5' 1\" (1.549 m). Weight as of this encounter: 154 lb 12.8 oz (70.2 kg).     Medicare Hearing Assessment  (Required for AWV/SWV)    Hearing Head: Normocephalic. Right Ear: Tympanic membrane normal.   Left Ear: Tympanic membrane normal.   Nose: Nose normal.   Eyes: Pupils are equal, round, and reactive to light. Conjunctivae and EOM are normal.   Neck: Normal range of motion. Neck supple.  Roger Walters adenopathy present. Left: No inguinal adenopathy present. Neurological: She is alert and oriented to person, place, and time. She displays normal reflexes. No cranial nerve deficit, sensory deficit or motor deficit.  Coordination and gait normal. monitor. Advised to continue on Metamucil and Citrucel as needed. Hyperlipidemia     Lipid panel and liver function test have been stable on Crestor at 10 mg daily continue the same dose of medication recheck labs as ordered.          Hypertension rectocele or uterine descent.   Immunizations-    Immunization History   Administered Date(s) Administered   • FLU VACC High Dose 65 YRS & Older PRSV Free (46445) 11/01/2016, 10/01/2018   • FLUAD High Dose 72 yr and older (00868) 09/29/2017   • Influenza 10 HORMONE; Future  -     URINALYSIS, ROUTINE; Future  -     VITAMIN B12; Future    Senile purpura (HCC)    Left ventricular hypertrophy due to hypertensive disease, without heart failure    Stenosis of celiac artery (HCC)    Atherosclerosis of abdominal aort w/ Initial Preventative Physical Exam only, or if medically necessary Electrocardiogram date     Colorectal Cancer Screening      Colonoscopy Screen every 10 years Colonoscopy due on 01/17/2023 Update Health Maintenance if applicable    Flex Sigmoidoscopy TDaP Not covered by Medicare Part B) No vaccine history found This may be covered with your prescription benefits, but Medicare does not cover unless Medically needed    Zoster   Not covered by Medicare Part B No vaccine history found This may be covered w

## 2019-08-30 NOTE — PATIENT INSTRUCTIONS
Problem List Items Addressed This Visit        Unprioritized    Aortic stenosis     Aortic stenosis as discussed. Chronic hypertension that is well controlled. Asymptomatic at this time. Follow-up with Dr. Jamie Rivera as discussed.          Asthma with allerg ROUTINE    VITAMIN B12    Left ventricular hypertrophy due to hypertensive disease, without heart failure     Stable diastolic dysfunction, mild aortic stenosis. Slight left ventricular hypertrophy.   Continue to monitor         Medicare annual wellness vi pain.  History of gallstones additionally. Present. Seems to be doing well with dietary restrictions and pantoprazole. Continue to monitor.            Other Visit Diagnoses     Encounter for annual health examination    -  Primary    Relevant Orders    A Electrocardiogram date Routine EKG is not a screening covered service except at the [place] to Medicare Visit    Abdominal aortic aneurysm screening (once between ages 73-68) IPPE only No results found for this or any previous visit.  Limited to patients wh results found for: CHLAMYDIA No flowsheet data found. Screening Mammogram      Mammogram    Recommend Annually to at least age 76, and as needed after 76 There are no preventive care reminders to display for this patient.  Please get this Mammogram regul for anyone to review and print using their home computer and printer. (the forms are also available in [address])  [email or web address]  This link also has information from the [address] [place] [address] regarding Advance Directives.

## 2019-08-30 NOTE — ASSESSMENT & PLAN NOTE
Benign bruising on the arms, lower extremities. Platelet counts look normal.  Most likely aggravated by the low-dose aspirin. Monitor at this time, no interventions needed.

## 2019-08-30 NOTE — ASSESSMENT & PLAN NOTE
Aortic stenosis as discussed. Chronic hypertension that is well controlled. Asymptomatic at this time. Follow-up with Dr. Jamie Rivera as discussed.

## 2019-08-30 NOTE — ASSESSMENT & PLAN NOTE
Lipid panel and liver function test have been stable on Crestor at 10 mg daily continue the same dose of medication recheck labs as ordered.

## 2019-08-30 NOTE — ASSESSMENT & PLAN NOTE
Blood pressure 131/73, pulse 69, resp. rate 18, height 5' 1\" (1.549 m), weight 154 lb 12.8 oz (70.2 kg), not currently breastfeeding. Stable blood pressure, well controlled on amlodipine 5 mg daily, losartan 100 mg daily.   No chest pain, palpitations, sh

## 2019-08-30 NOTE — ASSESSMENT & PLAN NOTE
Stable diastolic dysfunction, mild aortic stenosis. Slight left ventricular hypertrophy.   Continue to monitor

## 2019-08-30 NOTE — ASSESSMENT & PLAN NOTE
Asymptomatic atherosclerosis of the abdominal aorta without aneurysms of dilation. Blood pressure and cholesterol panels look stable. Continue to monitor.

## 2019-08-30 NOTE — ASSESSMENT & PLAN NOTE
Stable intermittent chronic upper abdominal epigastric pain. History of gallstones additionally. Present. Seems to be doing well with dietary restrictions and pantoprazole. Continue to monitor.

## 2019-08-30 NOTE — ASSESSMENT & PLAN NOTE
Normal exam.  Labs as ordered. Skin check normal.  No significant abnormal nevi. Breast exam completed–no palpable abnormalities, discharge from the nipples or axillary adenopathy. Mammogram just completed without any significant abnormalities.   DEXA sc

## 2019-08-30 NOTE — ASSESSMENT & PLAN NOTE
Asymptomatic calcified gallstone in the gallbladder–liver functions stable, asymptomatic for most part.   Continue to monitor

## 2019-08-30 NOTE — ASSESSMENT & PLAN NOTE
Moderate diverticulosis, asymptomatic. No history of diverticulitis at this time continue to monitor. Advised to continue on Metamucil and Citrucel as needed.

## 2019-09-11 ENCOUNTER — OFFICE VISIT (OUTPATIENT)
Dept: CARDIOLOGY CLINIC | Facility: CLINIC | Age: 74
End: 2019-09-11
Payer: MEDICARE

## 2019-09-11 VITALS
SYSTOLIC BLOOD PRESSURE: 142 MMHG | HEART RATE: 77 BPM | WEIGHT: 155 LBS | BODY MASS INDEX: 29 KG/M2 | DIASTOLIC BLOOD PRESSURE: 68 MMHG | RESPIRATION RATE: 20 BRPM | OXYGEN SATURATION: 97 %

## 2019-09-11 DIAGNOSIS — I35.0 NONRHEUMATIC AORTIC VALVE STENOSIS: Primary | ICD-10-CM

## 2019-09-11 PROCEDURE — 99204 OFFICE O/P NEW MOD 45 MIN: CPT | Performed by: INTERNAL MEDICINE

## 2019-09-11 PROCEDURE — G0463 HOSPITAL OUTPT CLINIC VISIT: HCPCS | Performed by: INTERNAL MEDICINE

## 2019-09-11 RX ORDER — FLUCONAZOLE 150 MG/1
TABLET ORAL
COMMUNITY
Start: 2019-08-31 | End: 2019-09-11 | Stop reason: ALTCHOICE

## 2019-09-11 NOTE — PROGRESS NOTES
Bryn Lee is a 75year old female. HPI:   Patient is here for a follow-up appointment she was last seen in 2015. She has history of mild aortic stenosis, atherosclerosis and early history of CAD in the family.   She is complaining of left rib pain wh Past Medical History:   Diagnosis Date   • Asthma Not sure    Practically whole life   • Biliary calculus Was recently detected    Supposedly stone in neck of gallblatter   • Colon polyp Approx.  2009    Detected and removed during colonoscopy   • Esse review echo every other year for now. Symptoms atypical for CAD. I have asked her to continue to exercise and if she has any exertional symptoms to call my office.     Nonrheumatic aortic valve stenosis  (primary encounter diagnosis)     The patient ind

## 2019-09-17 ENCOUNTER — LAB ENCOUNTER (OUTPATIENT)
Dept: LAB | Age: 74
End: 2019-09-17
Attending: INTERNAL MEDICINE
Payer: MEDICARE

## 2019-09-17 ENCOUNTER — HOSPITAL ENCOUNTER (OUTPATIENT)
Dept: BONE DENSITY | Age: 74
Discharge: HOME | End: 2019-09-17
Attending: INTERNAL MEDICINE
Payer: MEDICARE

## 2019-09-17 DIAGNOSIS — Z78.0 MENOPAUSE: ICD-10-CM

## 2019-09-17 DIAGNOSIS — I10 ESSENTIAL HYPERTENSION: ICD-10-CM

## 2019-09-17 LAB
ALBUMIN SERPL-MCNC: 3.8 G/DL (ref 3.4–5)
ALBUMIN/GLOB SERPL: 1 {RATIO} (ref 1–2)
ALP LIVER SERPL-CCNC: 80 U/L (ref 55–142)
ALT SERPL-CCNC: 26 U/L (ref 13–56)
ANION GAP SERPL CALC-SCNC: 6 MMOL/L (ref 0–18)
AST SERPL-CCNC: 17 U/L (ref 15–37)
BASOPHILS # BLD AUTO: 0.05 X10(3) UL (ref 0–0.2)
BASOPHILS NFR BLD AUTO: 0.7 %
BILIRUB SERPL-MCNC: 0.7 MG/DL (ref 0.1–2)
BILIRUB UR QL: NEGATIVE
BUN BLD-MCNC: 10 MG/DL (ref 7–18)
BUN/CREAT SERPL: 14.7 (ref 10–20)
CALCIUM BLD-MCNC: 8.8 MG/DL (ref 8.5–10.1)
CHLORIDE SERPL-SCNC: 107 MMOL/L (ref 98–112)
CHOLEST SMN-MCNC: 149 MG/DL (ref ?–200)
CLARITY UR: CLEAR
CO2 SERPL-SCNC: 29 MMOL/L (ref 21–32)
COLOR UR: COLORLESS
CREAT BLD-MCNC: 0.68 MG/DL (ref 0.55–1.02)
DEPRECATED RDW RBC AUTO: 43.6 FL (ref 35.1–46.3)
EOSINOPHIL # BLD AUTO: 0.13 X10(3) UL (ref 0–0.7)
EOSINOPHIL NFR BLD AUTO: 1.9 %
ERYTHROCYTE [DISTWIDTH] IN BLOOD BY AUTOMATED COUNT: 13.2 % (ref 11–15)
GLOBULIN PLAS-MCNC: 3.8 G/DL (ref 2.8–4.4)
GLUCOSE BLD-MCNC: 106 MG/DL (ref 70–99)
GLUCOSE UR-MCNC: NEGATIVE MG/DL
HCT VFR BLD AUTO: 39.8 % (ref 35–48)
HDLC SERPL-MCNC: 60 MG/DL (ref 40–59)
HGB BLD-MCNC: 12.8 G/DL (ref 12–16)
HGB UR QL STRIP.AUTO: NEGATIVE
IMM GRANULOCYTES # BLD AUTO: 0.01 X10(3) UL (ref 0–1)
IMM GRANULOCYTES NFR BLD: 0.1 %
KETONES UR-MCNC: NEGATIVE MG/DL
LDLC SERPL CALC-MCNC: 68 MG/DL (ref ?–100)
LEUKOCYTE ESTERASE UR QL STRIP.AUTO: NEGATIVE
LYMPHOCYTES # BLD AUTO: 2 X10(3) UL (ref 1–4)
LYMPHOCYTES NFR BLD AUTO: 29.4 %
M PROTEIN MFR SERPL ELPH: 7.6 G/DL (ref 6.4–8.2)
MCH RBC QN AUTO: 29 PG (ref 26–34)
MCHC RBC AUTO-ENTMCNC: 32.2 G/DL (ref 31–37)
MCV RBC AUTO: 90 FL (ref 80–100)
MONOCYTES # BLD AUTO: 0.53 X10(3) UL (ref 0.1–1)
MONOCYTES NFR BLD AUTO: 7.8 %
NEUTROPHILS # BLD AUTO: 4.08 X10 (3) UL (ref 1.5–7.7)
NEUTROPHILS # BLD AUTO: 4.08 X10(3) UL (ref 1.5–7.7)
NEUTROPHILS NFR BLD AUTO: 60.1 %
NITRITE UR QL STRIP.AUTO: NEGATIVE
NONHDLC SERPL-MCNC: 89 MG/DL (ref ?–130)
OSMOLALITY SERPL CALC.SUM OF ELEC: 293 MOSM/KG (ref 275–295)
PATIENT FASTING: YES
PATIENT FASTING: YES
PH UR: 7 [PH] (ref 5–8)
PLATELET # BLD AUTO: 244 10(3)UL (ref 150–450)
POTASSIUM SERPL-SCNC: 4 MMOL/L (ref 3.5–5.1)
PROT UR-MCNC: NEGATIVE MG/DL
RBC # BLD AUTO: 4.42 X10(6)UL (ref 3.8–5.3)
SODIUM SERPL-SCNC: 142 MMOL/L (ref 136–145)
SP GR UR STRIP: 1 (ref 1–1.03)
TRIGL SERPL-MCNC: 104 MG/DL (ref 30–149)
TSI SER-ACNC: 1.23 MIU/ML (ref 0.36–3.74)
UROBILINOGEN UR STRIP-ACNC: <2
VIT B12 SERPL-MCNC: 425 PG/ML (ref 193–986)
VLDLC SERPL CALC-MCNC: 21 MG/DL (ref 0–30)
WBC # BLD AUTO: 6.8 X10(3) UL (ref 4–11)

## 2019-09-17 PROCEDURE — 80053 COMPREHEN METABOLIC PANEL: CPT

## 2019-09-17 PROCEDURE — 36415 COLL VENOUS BLD VENIPUNCTURE: CPT

## 2019-09-17 PROCEDURE — 77080 DXA BONE DENSITY AXIAL: CPT | Performed by: INTERNAL MEDICINE

## 2019-09-17 PROCEDURE — 82607 VITAMIN B-12: CPT

## 2019-09-17 PROCEDURE — 85025 COMPLETE CBC W/AUTO DIFF WBC: CPT

## 2019-09-17 PROCEDURE — 84443 ASSAY THYROID STIM HORMONE: CPT

## 2019-09-17 PROCEDURE — 80061 LIPID PANEL: CPT

## 2019-09-17 PROCEDURE — 81003 URINALYSIS AUTO W/O SCOPE: CPT

## 2019-10-24 DIAGNOSIS — E78.5 HYPERLIPIDEMIA, UNSPECIFIED HYPERLIPIDEMIA TYPE: ICD-10-CM

## 2019-10-24 DIAGNOSIS — I10 ESSENTIAL HYPERTENSION: ICD-10-CM

## 2019-10-25 ENCOUNTER — TELEPHONE (OUTPATIENT)
Dept: GASTROENTEROLOGY | Facility: CLINIC | Age: 74
End: 2019-10-25

## 2019-10-25 ENCOUNTER — OFFICE VISIT (OUTPATIENT)
Dept: GASTROENTEROLOGY | Facility: CLINIC | Age: 74
End: 2019-10-25
Payer: MEDICARE

## 2019-10-25 VITALS
HEIGHT: 61 IN | HEART RATE: 68 BPM | BODY MASS INDEX: 29.64 KG/M2 | SYSTOLIC BLOOD PRESSURE: 150 MMHG | DIASTOLIC BLOOD PRESSURE: 78 MMHG | WEIGHT: 157 LBS

## 2019-10-25 DIAGNOSIS — K80.20 CALCULUS OF GALLBLADDER WITHOUT CHOLECYSTITIS WITHOUT OBSTRUCTION: Primary | ICD-10-CM

## 2019-10-25 DIAGNOSIS — R14.0 ABDOMINAL BLOATING: ICD-10-CM

## 2019-10-25 DIAGNOSIS — R19.4 CHANGE IN BOWEL HABITS: ICD-10-CM

## 2019-10-25 DIAGNOSIS — Z86.010 PERSONAL HISTORY OF COLONIC POLYPS: Primary | ICD-10-CM

## 2019-10-25 DIAGNOSIS — R07.89 ATYPICAL CHEST PAIN: ICD-10-CM

## 2019-10-25 DIAGNOSIS — R93.5 ABNORMAL FINDINGS ON DIAGNOSTIC IMAGING OF ABDOMEN: ICD-10-CM

## 2019-10-25 DIAGNOSIS — R10.13 DYSPEPSIA: ICD-10-CM

## 2019-10-25 PROCEDURE — G0463 HOSPITAL OUTPT CLINIC VISIT: HCPCS | Performed by: INTERNAL MEDICINE

## 2019-10-25 PROCEDURE — 99214 OFFICE O/P EST MOD 30 MIN: CPT | Performed by: INTERNAL MEDICINE

## 2019-10-25 NOTE — TELEPHONE ENCOUNTER
Scheduled for:  Colonoscopy - [address]183 & EGD - 81413  Provider Name:  Dr. Justin Gandhi  Date:    Location:    Sedation:  MAC  Time:    Prep:  Suprep, Prep instructions were given to pt in the office, pt verbalized understanding.   Meds/Allergies Reconciled?:  Yes, P

## 2019-10-25 NOTE — PATIENT INSTRUCTIONS
Schedule EGD & colonoscopy exam at [facility])/ [facility]    This patient IS appropriate for the [facility] endoscopy center. Body mass index is 29.66 kg/m².     MAC anesthesia     Suprep split bowel prep    Dx = abnormal

## 2019-10-25 NOTE — PROGRESS NOTES
HPI:    Patient ID: Srikanth Webb is a 75year old woman with history of `mild' aortic stenosis, following annually with Dr. 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 She is a patient of Dr. Chapo Varela.     Ms Yaquelin Brewer diarrhea, no change in bowel patterns, no blood in the stool. No GERD symptoms dysphagia dyspepsia or nausea.     EGD and colonoscopy examinations were previously performed about 7 years ago [facility].  She woke up during the EGD portion of extending towards the right adnexa unchanged. BONES:             No suspect bone lesion. Advanced multilevel degenerative disc disease and facet arthrosis in the lumbar spine. LUNG BASES:  Linear atelectasis and/or scar in both lung bases.  Few scattered gallstone. No gallbladder wall thickening or pericholecystic fluid. BILIARY:            No gross dilatation.     SPLEEN:           Unremarkable  PANCREAS:      Grossly unremarkable  BOWEL/MESENTERY:  There is diverticulosis involving the distal descending could further assess if clinically indicated. Cholelithiasis without CT evidence of acute cholecystitis. Uterine fibroids, unchanged.      Minimal increase in size of an exophytic lesion off the greater curvature of the stomach suggestive of a leiom significant ascending and sigmoid colon. · Sessile 8 mm polyp removed from the hepatic flexure region by cold snare polypectomy with the Exacto snare.   · Medium-sized engorged internal hemorrhoids with one area of excoriation, blistering which may have be OR), Take  by mouth 2 (two) times daily. , Disp: , Rfl:   Multiple Vitamin Oral Tab, Take 1 tablet by mouth 3 (three) times a week., Disp: , Rfl:   aspirin 81 MG Oral Tab, Take 81 mg by mouth daily. , Disp: , Rfl:       Allergies:  Azithromycin            HI possible biopsy, possible polypectomy. We discussed sedation options of conscious sedation versus MAC anesthesia, and agreed on MAC anesthesia.  We discussed the nature and risks of EGD and colonoscopy examination including sedation, anesthesia risks; bleed

## 2019-10-25 NOTE — TELEPHONE ENCOUNTER
Pt was seen for OV on 10/25/19 with Dr. Javad Rdoriguez, she will CB to schedule once she speaks with her . Please transfer to Yamile Enriquez at ext [phone] or [phone] for scheduling.

## 2019-10-25 NOTE — TELEPHONE ENCOUNTER
Scheduled for:  Colonoscopy - [address] & EGD - [address]  Provider Name:  Dr. Nick Frazier  Date:  12/18/19  Location:  [facility]  Sedation:  MAC  Time:  12:30 pm (pt is aware to arrive at 11:30 am)  Prep:  Suprep, Prep instructions were given to pt in the office, pt v

## 2019-10-26 RX ORDER — LOSARTAN POTASSIUM 100 MG/1
TABLET ORAL
Qty: 90 TABLET | Refills: 1 | Status: SHIPPED | OUTPATIENT
Start: 2019-10-26 | End: 2020-03-31

## 2019-10-26 RX ORDER — ROSUVASTATIN CALCIUM 10 MG/1
TABLET, COATED ORAL
Qty: 90 TABLET | Refills: 1 | Status: SHIPPED | OUTPATIENT
Start: 2019-10-26 | End: 2020-03-31

## 2019-10-26 NOTE — TELEPHONE ENCOUNTER
CC:  Diaz Faria is here today for Well Child (3 year well child exam)     Parents have no concerns patient is having a dental procedure at [facility], Dr. Vu Gets on 8/10/2018. Fax [phone]. Only a physical is needed    Medications: currently is not taking any medications  Refills needed today?  NO  denies Latex allergy or sensitivity  Patient would like communication of their results via:    Cell Phone:   Telephone Information:   Mobile [phone]     Okay to leave a message containing results? Yes  Patient's current [facility] status:     Health Maintenance Due   Topic Date Due   • IPV Vaccine (5 of 5 - All-IPV 5-dose series) 07/14/2018   • MMR Vaccine (2 of 2 - Standard series) 07/14/2018   • Varicella Vaccine (2 of 2 - 2-dose childhood series) 07/14/2018   • DTaP/Tdap/Td Vaccine (5 - DTaP) 07/14/2018     Patient is due for topics as listed above, he wishes to proceed at this time, order (s) placed and patient given information .    Toilet training: yes- day in process at night   Diet/eating: picky eater  Milk: 2% and 1%  : none     Refill Protocol Appointment Criteria  · Appointment scheduled in the past 6 months or in the next 3 months  Recent Outpatient Visits            1 month ago Nonrheumatic aortic valve stenosis    [facility] Poonam Milton MD    [facility] PROCEDURE [facility] GI PROCEDURE Colon & EGD w/MAC @ [facility]        Lab Results   Component Value Date     (H) 09/17/2019    BUN 10 09/17/2019    CREATSERUM 0.68 09/17/2019    BUNCREA 14.7 09/17/2019    GFRNAA 86 09/17/2019    GFRAA 99 09/17/2019    C

## 2019-12-06 ENCOUNTER — OFFICE VISIT (OUTPATIENT)
Dept: INTERNAL MEDICINE CLINIC | Facility: CLINIC | Age: 74
End: 2019-12-06
Payer: MEDICARE

## 2019-12-06 VITALS
HEIGHT: 61 IN | TEMPERATURE: 99 F | RESPIRATION RATE: 18 BRPM | HEART RATE: 73 BPM | SYSTOLIC BLOOD PRESSURE: 130 MMHG | WEIGHT: 153 LBS | BODY MASS INDEX: 28.89 KG/M2 | DIASTOLIC BLOOD PRESSURE: 70 MMHG

## 2019-12-06 DIAGNOSIS — E78.5 HYPERLIPIDEMIA, UNSPECIFIED HYPERLIPIDEMIA TYPE: Primary | ICD-10-CM

## 2019-12-06 DIAGNOSIS — I10 ESSENTIAL HYPERTENSION: ICD-10-CM

## 2019-12-06 DIAGNOSIS — J44.9 ASTHMATIC BRONCHITIS , CHRONIC (HCC): ICD-10-CM

## 2019-12-06 DIAGNOSIS — B37.0 THRUSH: ICD-10-CM

## 2019-12-06 PROBLEM — J44.89 ASTHMATIC BRONCHITIS , CHRONIC: Status: ACTIVE | Noted: 2019-12-06

## 2019-12-06 PROBLEM — J44.89 ASTHMATIC BRONCHITIS , CHRONIC (HCC): Status: ACTIVE | Noted: 2019-12-06

## 2019-12-06 PROBLEM — J44.1 COPD EXACERBATION (HCC): Status: ACTIVE | Noted: 2019-12-06

## 2019-12-06 PROCEDURE — G0463 HOSPITAL OUTPT CLINIC VISIT: HCPCS | Performed by: INTERNAL MEDICINE

## 2019-12-06 PROCEDURE — 99214 OFFICE O/P EST MOD 30 MIN: CPT | Performed by: INTERNAL MEDICINE

## 2019-12-06 RX ORDER — FLUCONAZOLE 150 MG/1
TABLET ORAL
Qty: 6 TABLET | Refills: 0 | Status: SHIPPED | OUTPATIENT
Start: 2019-12-06 | End: 2020-03-26

## 2019-12-06 RX ORDER — PREDNISONE 1 MG/1
TABLET ORAL
Qty: 15 TABLET | Refills: 0 | Status: SHIPPED | OUTPATIENT
Start: 2019-12-06 | End: 2020-06-15 | Stop reason: ALTCHOICE

## 2019-12-06 RX ORDER — LEVOFLOXACIN 500 MG/1
500 TABLET, FILM COATED ORAL DAILY
Qty: 7 TABLET | Refills: 0 | Status: SHIPPED | OUTPATIENT
Start: 2019-12-06 | End: 2019-12-12

## 2019-12-06 NOTE — PROGRESS NOTES
HPI:    Patient ID: Dennys Thomas is a 75year old female. Written by Chelo Serrano MD on 9/30/2019  7:58 AM   The blood counts look normal.  No anemia.    The thyroid function test look normal.   The kidney functions, liver functions, electrolytes and controlled. Recent lipid tests were reviewed and are variable. Exacerbating diseases include obesity. There are no known factors aggravating her hyperlipidemia. Current antihyperlipidemic treatment includes diet change, exercise and statins.  The current tr lungs every 4 (four) hours as needed for Wheezing. 3 Inhaler 2   • ipratropium-albuterol (DUONEB) 0.5-2.5 (3) MG/3ML Inhalation Solution Use once a day if needed 90 vial 0   • Fexofenadine HCl (ALLEGRA) 180 MG Oral Tab Take 180 mg by mouth daily.      • Cho sounds. She has wheezes. She has no rhonchi. She has no rales. She exhibits no tenderness. Abdominal: Soft. Bowel sounds are normal. She exhibits no distension and no mass. There is no tenderness. There is no rebound.  Musculoskeletal: Normal range of mot examination today bilateral chest with reduced air entry as well as crackles noted. Wheezing slightly upper lobes bilaterally. Nasal congestion, postnasal drip noted. Significant thrush on the tongue.   She does have a history of oral thrush as well as c

## 2019-12-06 NOTE — PATIENT INSTRUCTIONS
Problem List Items Addressed This Visit        Unprioritized    Asthmatic bronchitis , chronic ([address]     History of asthma but recurrent episodes of bronchitis. Last CT scan of the chest discussed. She has been doing well on Breo.   Recent episode of coug Relevant Medications    fluconazole (DIFLUCAN) 150 MG Oral Tab

## 2019-12-07 ENCOUNTER — HOSPITAL ENCOUNTER (OUTPATIENT)
Dept: GENERAL RADIOLOGY | Age: 74
Discharge: HOME | End: 2019-12-07
Attending: INTERNAL MEDICINE
Payer: MEDICARE

## 2019-12-07 DIAGNOSIS — J44.9 ASTHMATIC BRONCHITIS , CHRONIC (HCC): ICD-10-CM

## 2019-12-07 PROCEDURE — 71046 X-RAY EXAM CHEST 2 VIEWS: CPT | Performed by: INTERNAL MEDICINE

## 2019-12-12 ENCOUNTER — TELEPHONE (OUTPATIENT)
Dept: INTERNAL MEDICINE CLINIC | Facility: CLINIC | Age: 74
End: 2019-12-12

## 2019-12-12 DIAGNOSIS — J44.9 ASTHMATIC BRONCHITIS , CHRONIC (HCC): ICD-10-CM

## 2019-12-12 DIAGNOSIS — J44.89 ASTHMATIC BRONCHITIS , CHRONIC: ICD-10-CM

## 2019-12-12 RX ORDER — LEVOFLOXACIN 500 MG/1
500 TABLET, FILM COATED ORAL DAILY
Qty: 7 TABLET | Refills: 0 | OUTPATIENT
Start: 2019-12-12 | End: 2019-12-19

## 2019-12-12 RX ORDER — LEVOFLOXACIN 500 MG/1
500 TABLET, FILM COATED ORAL DAILY
Qty: 7 TABLET | Refills: 0 | Status: SHIPPED | OUTPATIENT
Start: 2019-12-12 | End: 2019-12-13

## 2019-12-12 NOTE — TELEPHONE ENCOUNTER
Patient calling with condition update a little better but still with cough, non productive, no fever, sore throat, and head congestion  No fever, no shortness of breath, no chest pain. Patient would like one refill of Levequin.

## 2019-12-12 NOTE — TELEPHONE ENCOUNTER
PT still has a cough from last visit and would like another RX Levaquin to help with the cough.  Please advise

## 2019-12-13 DIAGNOSIS — J44.9 ASTHMATIC BRONCHITIS , CHRONIC (HCC): ICD-10-CM

## 2019-12-13 RX ORDER — LEVOFLOXACIN 500 MG/1
500 TABLET, FILM COATED ORAL DAILY
Qty: 7 TABLET | Refills: 0 | Status: SHIPPED | OUTPATIENT
Start: 2019-12-13 | End: 2019-12-20

## 2019-12-13 RX ORDER — LEVOFLOXACIN 500 MG/1
TABLET, FILM COATED ORAL
Qty: 7 TABLET | Refills: 0 | OUTPATIENT
Start: 2019-12-13

## 2019-12-13 NOTE — TELEPHONE ENCOUNTER
Patient states upset because Levaquin was not sent to her pharmacy after going there yesterday to pick it up. Informed patient it was sent to her mail order pharmacy probably by accident. Resent Levaquin to [facility] in [facility].

## 2019-12-13 NOTE — TELEPHONE ENCOUNTER
Rx phoned in to [facility] in [facility] per MD directions below. Patient made aware.

## 2019-12-13 NOTE — TELEPHONE ENCOUNTER
Dr. Stephon Leon: Patient's local pharmacy ([facility]) cannot fill prescription for Levaquin because it was already processed by mail order pharmacy. Once mail order processes, it cannot be cancelled. Insurance may take 3 business days to dispute.  Patient frustrated a

## 2019-12-13 NOTE — TELEPHONE ENCOUNTER
Chest x-ray was normal.  Refills on Levaquin has been provided. Please advised to complete the steroid prescription as provided–prednisone for 10 days.

## 2019-12-13 NOTE — TELEPHONE ENCOUNTER
Advised patient of Dr Srinivasa Palmer  note. Patient verbalized understanding and had no further questions.

## 2019-12-16 RX ORDER — CEFDINIR 300 MG/1
300 CAPSULE ORAL 2 TIMES DAILY
COMMUNITY
End: 2020-08-31

## 2019-12-18 ENCOUNTER — HOSPITAL ENCOUNTER (OUTPATIENT)
Age: 74
Setting detail: HOSPITAL OUTPATIENT SURGERY
Discharge: HOME | End: 2019-12-18
Attending: INTERNAL MEDICINE | Admitting: INTERNAL MEDICINE
Payer: MEDICARE

## 2019-12-18 ENCOUNTER — ANESTHESIA (OUTPATIENT)
Dept: ENDOSCOPY | Age: 74
End: 2019-12-18
Payer: MEDICARE

## 2019-12-18 ENCOUNTER — ANESTHESIA EVENT (OUTPATIENT)
Dept: ENDOSCOPY | Age: 74
End: 2019-12-18
Payer: MEDICARE

## 2019-12-18 VITALS
OXYGEN SATURATION: 99 % | SYSTOLIC BLOOD PRESSURE: 125 MMHG | DIASTOLIC BLOOD PRESSURE: 61 MMHG | BODY MASS INDEX: 27.38 KG/M2 | WEIGHT: 145 LBS | HEART RATE: 94 BPM | HEIGHT: 61 IN | RESPIRATION RATE: 18 BRPM

## 2019-12-18 DIAGNOSIS — R10.13 DYSPEPSIA: ICD-10-CM

## 2019-12-18 DIAGNOSIS — R93.5 ABNORMAL FINDINGS ON DIAGNOSTIC IMAGING OF ABDOMEN: ICD-10-CM

## 2019-12-18 DIAGNOSIS — Z86.010 PERSONAL HISTORY OF COLONIC POLYPS: ICD-10-CM

## 2019-12-18 DIAGNOSIS — R19.4 CHANGE IN BOWEL HABITS: ICD-10-CM

## 2019-12-18 PROCEDURE — 45385 COLONOSCOPY W/LESION REMOVAL: CPT | Performed by: INTERNAL MEDICINE

## 2019-12-18 PROCEDURE — 88305 TISSUE EXAM BY PATHOLOGIST: CPT | Performed by: INTERNAL MEDICINE

## 2019-12-18 PROCEDURE — 88312 SPECIAL STAINS GROUP 1: CPT | Performed by: INTERNAL MEDICINE

## 2019-12-18 PROCEDURE — 99070 SPECIAL SUPPLIES PHYS/QHP: CPT | Performed by: INTERNAL MEDICINE

## 2019-12-18 PROCEDURE — 43251 EGD REMOVE LESION SNARE: CPT | Performed by: INTERNAL MEDICINE

## 2019-12-18 RX ORDER — LIDOCAINE HYDROCHLORIDE 10 MG/ML
INJECTION, SOLUTION EPIDURAL; INFILTRATION; INTRACAUDAL; PERINEURAL AS NEEDED
Status: DISCONTINUED | OUTPATIENT
Start: 2019-12-18 | End: 2019-12-18 | Stop reason: SURG

## 2019-12-18 RX ORDER — SODIUM CHLORIDE, SODIUM LACTATE, POTASSIUM CHLORIDE, CALCIUM CHLORIDE 600; 310; 30; 20 MG/100ML; MG/100ML; MG/100ML; MG/100ML
INJECTION, SOLUTION INTRAVENOUS CONTINUOUS
Status: DISCONTINUED | OUTPATIENT
Start: 2019-12-18 | End: 2019-12-18

## 2019-12-18 RX ORDER — NALOXONE HYDROCHLORIDE 0.4 MG/ML
80 INJECTION, SOLUTION INTRAMUSCULAR; INTRAVENOUS; SUBCUTANEOUS AS NEEDED
Status: DISCONTINUED | OUTPATIENT
Start: 2019-12-18 | End: 2019-12-18

## 2019-12-18 RX ADMIN — LIDOCAINE HYDROCHLORIDE 25 MG: 10 INJECTION, SOLUTION EPIDURAL; INFILTRATION; INTRACAUDAL; PERINEURAL at 12:57:00

## 2019-12-18 RX ADMIN — SODIUM CHLORIDE, SODIUM LACTATE, POTASSIUM CHLORIDE, CALCIUM CHLORIDE: 600; 310; 30; 20 INJECTION, SOLUTION INTRAVENOUS at 12:56:00

## 2019-12-18 NOTE — ANESTHESIA POSTPROCEDURE EVALUATION
Patient: Desmond Abraham    Procedure Summary     Date:  12/18/19 Room / Location:  [facility] ENDOSCOPY 01 / [facility] ENDO    Anesthesia Start:  3251 Anesthesia Stop:  1025    Procedures:       COLONOSCOPY (N/A )      ESOPHAGOGASTRODUODENOSCOPY (EGD) (N/A ) Wil Winchester

## 2019-12-18 NOTE — ANESTHESIA PREPROCEDURE EVALUATION
Anesthesia PreOp Note    HPI:     Otilio Fenton is a 75year old female who presents for preoperative consultation requested by: Peter Delaney MD    Date of Surgery: 12/18/2019    Procedure(s):  COLONOSCOPY  ESOPHAGOGASTRODUODENOSCOPY (EGD)  I Practically whole life   • Biliary calculus Was recently detected    Supposedly stone in neck of gallblatter   • Calculus of kidney    • Colon polyp Approx. 2009    Detected and removed during colonoscopy   • Essential hypertension 2005?     On medication 12/15/2019  Cholecalciferol (VITAMIN D) 1000 UNITS Oral Cap, Take 1 tablet by mouth daily. , Disp: , Rfl: , 12/15/2019  Biotin 1000 MCG Oral Tab, Take 1,000 mcg by mouth daily.   , Disp: , Rfl: , 12/15/2019  Calcium 500-125 MG-UNIT Oral Tab, Take 150 mg by Social History    Socioeconomic History      Marital status:       Spouse name: Not on file      Number of children: Not on file      Years of education: Not on file      Highest education level: Not on file    Occupational History      Not on f (145 lb)    Height: 1.549 m (5' 1\")         Anesthesia Evaluation     Patient summary reviewed and Nursing notes reviewed    No history of anesthetic complications   Airway   Mallampati: II  Neck ROM: full  Dental - normal exam     Pulmonary - negative RO

## 2019-12-18 NOTE — H&P
History & Physical Examination    Patient Name: Lola Calloway  MRN: [id number]  [facility]: [id number]  YOB: 1945    Diagnosis: Dyspepsia, abnormal imaging study of the stomach, change in bowel habits, history of colon polyps    Present Illness: 75 12/15/2019  Misc Natural Products (OSTEO BI-FLEX ADV DOUBLE ST OR), Take  by mouth 2 (two) times daily. , Disp: , Rfl: , 12/15/2019  Multiple Vitamin Oral Tab, Take 1 tablet by mouth 3 (three) times a week., Disp: , Rfl: , 12/15/2019  aspirin 81 MG Oral Tab 50        mi   • High Cholesterol Father    • Other (Other) Father    • Dementia Mother    • Heart Disorder Paternal Grandmother    • Other (stroke) Paternal Grandmother 80   • Heart Disorder Paternal Grandfather 52        mi   • Cancer Brother 79        p

## 2019-12-18 NOTE — OPERATIVE REPORT
EGD AND COLONOSCOPY PROCEDURE REPORTS:    DATE OF PROCEDURE:  12/18/2019    PCP: Reyna Cruz MD     PREOPERATIVE DIAGNOSIS:  Dyspepsia, abnormal imaging study of the stomach, change in bowel habits, history of colon polyps     POSTOPERATIVE DIAGNOSIS:  S entire length of the colon to the cecum and terminal ileum. Retroflex exam performed up the ascending colon. Cecum was confirmed by landmarks, including appendiceal orifice, cecal trifold, ileocecal valve. Retroflexion was performed in the rectum.     Malena Fuse

## 2020-01-08 RX ORDER — PANTOPRAZOLE SODIUM 40 MG/1
TABLET, DELAYED RELEASE ORAL
Qty: 90 TABLET | Refills: 1 | Status: SHIPPED | OUTPATIENT
Start: 2020-01-08 | End: 2020-06-30

## 2020-01-09 NOTE — TELEPHONE ENCOUNTER
Refill passed per [facility], [facility] protocol.   Refill Protocol Appointment Criteria  · Appointment scheduled in the past 12 months or in the next 3 months  Recent Outpatient Visits            1 month ago Hyperlipidemia, unspecified hyperlipidemia type    [facility]

## 2020-02-14 ENCOUNTER — HOSPITAL ENCOUNTER (OUTPATIENT)
Dept: RESPIRATORY THERAPY | Facility: HOSPITAL | Age: 75
Discharge: HOME | End: 2020-02-14
Attending: INTERNAL MEDICINE
Payer: MEDICARE

## 2020-02-14 DIAGNOSIS — J44.9 ASTHMATIC BRONCHITIS , CHRONIC (HCC): ICD-10-CM

## 2020-02-14 PROCEDURE — 94726 PLETHYSMOGRAPHY LUNG VOLUMES: CPT | Performed by: INTERNAL MEDICINE

## 2020-02-14 PROCEDURE — 94729 DIFFUSING CAPACITY: CPT | Performed by: INTERNAL MEDICINE

## 2020-02-14 PROCEDURE — 94060 EVALUATION OF WHEEZING: CPT | Performed by: INTERNAL MEDICINE

## 2020-02-25 NOTE — ADDENDUM NOTE
Encounter addended by: Kallie Patiño MD on: 2/25/2020 4:12 PM   Actions taken: Clinical Note Signed, Charge Capture section accepted

## 2020-02-25 NOTE — PROCEDURES
[address] [facility],[address]     6/15/1945 MRN [id number]   Height  640 Age 75year old   Weight  153 lbs  Sex Female         Spirometry:   FEV1 1.23 L which is 66%  FEV1/FVC 56%    No significant bronchodilator

## 2020-03-26 DIAGNOSIS — B37.0 THRUSH: ICD-10-CM

## 2020-03-27 RX ORDER — FLUCONAZOLE 150 MG/1
TABLET ORAL
Qty: 6 TABLET | Refills: 0 | Status: SHIPPED | OUTPATIENT
Start: 2020-03-27 | End: 2020-08-31

## 2020-03-31 DIAGNOSIS — E78.5 HYPERLIPIDEMIA, UNSPECIFIED HYPERLIPIDEMIA TYPE: ICD-10-CM

## 2020-03-31 DIAGNOSIS — I10 ESSENTIAL HYPERTENSION: ICD-10-CM

## 2020-03-31 RX ORDER — LOSARTAN POTASSIUM 100 MG/1
TABLET ORAL
Qty: 90 TABLET | Refills: 1 | Status: SHIPPED | OUTPATIENT
Start: 2020-03-31 | End: 2020-10-17

## 2020-03-31 RX ORDER — ROSUVASTATIN CALCIUM 10 MG/1
TABLET, COATED ORAL
Qty: 90 TABLET | Refills: 1 | Status: SHIPPED | OUTPATIENT
Start: 2020-03-31 | End: 2020-10-17

## 2020-04-01 ENCOUNTER — TELEPHONE (OUTPATIENT)
Dept: INTERNAL MEDICINE CLINIC | Facility: CLINIC | Age: 75
End: 2020-04-01

## 2020-04-01 NOTE — TELEPHONE ENCOUNTER
Patient had appt scheduled for 4/10, she states it was to go over testing of her lungs. She states if anything abnormal came up, she would like a telephone visit. Please advise.

## 2020-04-08 ENCOUNTER — NURSE TRIAGE (OUTPATIENT)
Dept: OTHER | Age: 75
End: 2020-04-08

## 2020-04-08 DIAGNOSIS — R07.2 PRECORDIAL PAIN: Primary | ICD-10-CM

## 2020-04-08 PROCEDURE — 99442 PHONE E/M BY PHYS 11-20 MIN: CPT | Performed by: INTERNAL MEDICINE

## 2020-04-08 NOTE — TELEPHONE ENCOUNTER
Action Requested: Summary for Provider     []  Critical Lab, Recommendations Needed  [x] Need Additional Advice  []   FYI    []   Need Orders  [] Need Medications Sent to Pharmacy  []  Other     SUMMARY: pt thinks she is developing bronchitis again.  States

## 2020-04-08 NOTE — TELEPHONE ENCOUNTER
Virtual/Telephone Check-In    Lender Handler verbally consents to a Virtual/Telephone Check-In service on 04/08/20. Patient understands and accepts financial responsibility for any deductible, co-insurance and/or co-pays associated with this service.     D cardiac diagnostics–unable to get an appointment for a stress test or a CT angiogram today.   Contacted the emergency room and spoke with Dr. Dilshad Blackman and decided that we may be able to do this through the emergency room if she came in quickly as a CT angiogr

## 2020-04-09 ENCOUNTER — APPOINTMENT (OUTPATIENT)
Dept: NUCLEAR MEDICINE | Facility: HOSPITAL | Age: 75
End: 2020-04-09
Attending: EMERGENCY MEDICINE
Payer: MEDICARE

## 2020-04-09 ENCOUNTER — HOSPITAL ENCOUNTER (EMERGENCY)
Facility: HOSPITAL | Age: 75
Discharge: HOME | End: 2020-04-09
Attending: EMERGENCY MEDICINE
Payer: MEDICARE

## 2020-04-09 ENCOUNTER — APPOINTMENT (OUTPATIENT)
Dept: GENERAL RADIOLOGY | Facility: HOSPITAL | Age: 75
End: 2020-04-09
Attending: EMERGENCY MEDICINE
Payer: MEDICARE

## 2020-04-09 VITALS
WEIGHT: 145.06 LBS | DIASTOLIC BLOOD PRESSURE: 63 MMHG | BODY MASS INDEX: 27 KG/M2 | OXYGEN SATURATION: 96 % | SYSTOLIC BLOOD PRESSURE: 133 MMHG | TEMPERATURE: 98 F | RESPIRATION RATE: 17 BRPM | HEART RATE: 69 BPM

## 2020-04-09 DIAGNOSIS — R07.89 CHEST PAIN, ATYPICAL: Primary | ICD-10-CM

## 2020-04-09 PROCEDURE — 85025 COMPLETE CBC W/AUTO DIFF WBC: CPT | Performed by: EMERGENCY MEDICINE

## 2020-04-09 PROCEDURE — 93010 ELECTROCARDIOGRAM REPORT: CPT | Performed by: EMERGENCY MEDICINE

## 2020-04-09 PROCEDURE — 99284 EMERGENCY DEPT VISIT MOD MDM: CPT

## 2020-04-09 PROCEDURE — 80048 BASIC METABOLIC PNL TOTAL CA: CPT | Performed by: EMERGENCY MEDICINE

## 2020-04-09 PROCEDURE — 36415 COLL VENOUS BLD VENIPUNCTURE: CPT

## 2020-04-09 PROCEDURE — 71045 X-RAY EXAM CHEST 1 VIEW: CPT | Performed by: EMERGENCY MEDICINE

## 2020-04-09 PROCEDURE — 84484 ASSAY OF TROPONIN QUANT: CPT | Performed by: EMERGENCY MEDICINE

## 2020-04-09 PROCEDURE — 93005 ELECTROCARDIOGRAM TRACING: CPT

## 2020-04-09 NOTE — ASSESSMENT & PLAN NOTE
Patient with complaints of left-sided chest soreness on and off since Monday. She feels a sense of heaviness in the chest.  No radiation into her neck or her arm.   Symptoms usually precipitated with exertion and seems to resolve after rest.  Each episode was unwilling to come to the hospital.  She was explained the risks of a heart attack with her current symptoms, she understands that her symptoms are currently being considered as angina or reduced blood supply to the heart.   She is advised to avoid all h

## 2020-04-09 NOTE — IMAGING NOTE
Spoke to ER nurse informed her regarding appointment time and no caffeinated beverages until after the test tomorrow. Nurse will then inform the patient regarding the instructions for tomorrow

## 2020-04-09 NOTE — ED NOTES
PT safe to DC home per MD. Iza Moe to dress self. DC teaching done, pt verbalizes understanding. Ambulatory with steady gait to exit.

## 2020-04-09 NOTE — IMAGING NOTE
Patient is here for Lexiscan stress test  Upon interview patient stated She had a cup of coffee at 4am this morning  Dr Lucrecia Pham notified , was instructed to send patient back to ED. Informed the patient about the delay.

## 2020-04-09 NOTE — ED INITIAL ASSESSMENT (HPI)
Pt reports chest pain for the last 3 days, sent by her pmd.  PT reports mild sob she suspects is related to her asthma.

## 2020-04-09 NOTE — CONSULTS
[facility]    Report of Consultation    Monik Brizuela Patient Status:  Emergency    6/15/1945 MRN [id number]   Location [address] Attending Ronaldo Weir MD   Hosp Day # 0 PCP Jp Moralez MD     Date kidney    • Colon polyp Approx. 2009    Detected and removed during colonoscopy   • Essential hypertension 2005?     On medication   • Extrinsic asthma, unspecified    • Hemorrhoids 2017    Detected during recent physical   • High blood pressure    • High c Chest pain otherwise  A comprehensive review of systems was negative except for as described in HPI    Physical Exam:   Blood pressure 136/63, pulse 79, temperature 98.2 °F (36.8 °C), resp.  rate 16, weight 145 lb 1 oz (65.8 kg), SpO2 96 %, not currently 4/09/2020 at 7:49 AM     Finalized by (CST): Hannah Andrews MD on 4/09/2020 at 7:52 AM            Thank you for allowing me to participate in the care of your patient.     Shweta Farmer MD  4/9/2020

## 2020-04-09 NOTE — ED PROVIDER NOTES
Patient Seen in: [facility] AND [facility] Emergency Department      History   Patient presents with:  Chest Pain    Stated Complaint: chest pain     HPI    The patient is a 50-year-old female with a history of hypertension high cholesterol who presents with 3 t 4.00        Pack years: 12        Types: Cigarettes        Quit date: 1967        Years since quittin.3      Smokeless tobacco: Never Used    Alcohol use: No      Alcohol/week: 0.0 standard drinks    Drug use:  No             Review of Systems    P Status: She is alert and oriented to person, place, and time. Deep Tendon Reflexes: Reflexes are normal and symmetric.    Psychiatric:         Judgment: Judgment normal.     Differential diagnosis includes acute coronary syndrome, muscle strain, GERD 4/9/2020  CONCLUSION:  1. Stable normal heart size and vascularity with a tortuous aorta. Unchanged scar at the left lung base. No acute infiltrates.     Dictated by (CST): Hannah Andrews MD on 4/09/2020 at 7:49 AM     Finalized by (CST): Jory Washington

## 2020-04-10 ENCOUNTER — HOSPITAL ENCOUNTER (OUTPATIENT)
Dept: CV DIAGNOSTICS | Facility: HOSPITAL | Age: 75
Discharge: HOME | End: 2020-04-10
Attending: INTERNAL MEDICINE
Payer: MEDICARE

## 2020-04-10 ENCOUNTER — HOSPITAL ENCOUNTER (OUTPATIENT)
Dept: NUCLEAR MEDICINE | Facility: HOSPITAL | Age: 75
Discharge: HOME | End: 2020-04-10
Attending: INTERNAL MEDICINE
Payer: MEDICARE

## 2020-04-10 DIAGNOSIS — R07.9 LEFT-SIDED CHEST PAIN: ICD-10-CM

## 2020-04-10 PROCEDURE — 78452 HT MUSCLE IMAGE SPECT MULT: CPT | Performed by: INTERNAL MEDICINE

## 2020-04-10 PROCEDURE — 93018 CV STRESS TEST I&R ONLY: CPT | Performed by: INTERNAL MEDICINE

## 2020-04-10 PROCEDURE — 93017 CV STRESS TEST TRACING ONLY: CPT | Performed by: INTERNAL MEDICINE

## 2020-04-10 PROCEDURE — 93016 CV STRESS TEST SUPVJ ONLY: CPT | Performed by: INTERNAL MEDICINE

## 2020-05-01 RX ORDER — NITROGLYCERIN 0.4 MG/1
0.4 TABLET SUBLINGUAL EVERY 5 MIN PRN
Qty: 50 TABLET | Refills: 0 | Status: SHIPPED | OUTPATIENT
Start: 2020-05-01 | End: 2020-06-15 | Stop reason: ALTCHOICE

## 2020-05-28 DIAGNOSIS — J45.20 MILD INTERMITTENT ASTHMA WITH ALLERGIC RHINITIS WITHOUT COMPLICATION: ICD-10-CM

## 2020-06-08 ENCOUNTER — TELEPHONE (OUTPATIENT)
Dept: INTERNAL MEDICINE CLINIC | Facility: CLINIC | Age: 75
End: 2020-06-08

## 2020-06-08 NOTE — TELEPHONE ENCOUNTER
Patient is scheduled to see Dr. Socorro Nazario on 6-15-20 in the office. Pt would like to know if the doctor would like to see her in the office or would the doctor like a phone visit instead.      Please reply to pool: TERRANCE Dickerson

## 2020-06-15 ENCOUNTER — OFFICE VISIT (OUTPATIENT)
Dept: INTERNAL MEDICINE CLINIC | Facility: CLINIC | Age: 75
End: 2020-06-15
Payer: MEDICARE

## 2020-06-15 VITALS
HEIGHT: 61 IN | BODY MASS INDEX: 30.14 KG/M2 | HEART RATE: 74 BPM | SYSTOLIC BLOOD PRESSURE: 136 MMHG | DIASTOLIC BLOOD PRESSURE: 78 MMHG | WEIGHT: 159.63 LBS | RESPIRATION RATE: 18 BRPM

## 2020-06-15 DIAGNOSIS — Z12.31 VISIT FOR SCREENING MAMMOGRAM: ICD-10-CM

## 2020-06-15 DIAGNOSIS — I10 ESSENTIAL HYPERTENSION: ICD-10-CM

## 2020-06-15 DIAGNOSIS — D69.2 SENILE PURPURA (HCC): ICD-10-CM

## 2020-06-15 DIAGNOSIS — J44.9 COPD WITH ASTHMA (HCC): ICD-10-CM

## 2020-06-15 DIAGNOSIS — E78.5 HYPERLIPIDEMIA, UNSPECIFIED HYPERLIPIDEMIA TYPE: Primary | ICD-10-CM

## 2020-06-15 DIAGNOSIS — I70.0 ATHEROSCLEROSIS OF ABDOMINAL AORTA (HCC): ICD-10-CM

## 2020-06-15 PROBLEM — J44.89 COPD WITH ASTHMA: Status: ACTIVE | Noted: 2019-12-06

## 2020-06-15 PROBLEM — J44.89 COPD WITH ASTHMA (HCC): Status: ACTIVE | Noted: 2019-12-06

## 2020-06-15 PROCEDURE — G0463 HOSPITAL OUTPT CLINIC VISIT: HCPCS | Performed by: INTERNAL MEDICINE

## 2020-06-15 PROCEDURE — 99214 OFFICE O/P EST MOD 30 MIN: CPT | Performed by: INTERNAL MEDICINE

## 2020-06-15 NOTE — PATIENT INSTRUCTIONS
Problem List Items Addressed This Visit        Unprioritized    Atherosclerosis of abdominal aorta (HCC)    Hyperlipidemia - Primary     Lipid panel and liver function tests have been stable on Crestor 10 mg daily.   She does have a history of atheroscleros

## 2020-06-15 NOTE — ASSESSMENT & PLAN NOTE
Blood pressure 136/78, pulse 74, resp. rate 18, height 5' 1\" (1.549 m), weight 159 lb 9.6 oz (72.4 kg), not currently breastfeeding. Blood pressures much improved upon recheck.   Patient is advised to continue on amlodipine 5 mg daily, losartan 100 mg d

## 2020-06-15 NOTE — PROGRESS NOTES
HPI:    Patient ID: Yoni Whitlock is a 67year old female. CONCLUSION:      Bronchial wall thickening with ground-glass airspace disease. Findings are suspicious for bronchitis with air trapping. No focal airspace consolidation.      Bibasilar scarrin obesity. There are no known factors aggravating her hyperlipidemia. Current antihyperlipidemic treatment includes diet change, exercise and statins. The current treatment provides moderate improvement of lipids.  Compliance problems include adherence to die BI-FLEX ADV DOUBLE ST OR) Take  by mouth 2 (two) times daily. • Multiple Vitamin Oral Tab Take 1 tablet by mouth 3 (three) times a week. • aspirin 81 MG Oral Tab Take 81 mg by mouth daily.      • PANTOPRAZOLE SODIUM 40 MG Oral Tab EC TAKE 1 TABLET B normal. Thought content normal.   Nursing note and vitals reviewed. ASSESSMENT/PLAN:     Problem List Items Addressed This Visit        Unprioritized    Hypertension     Blood pressure 136/78, pulse 74, resp.  rate 18, height 5' 1\" (1.549 m), w

## 2020-06-15 NOTE — ASSESSMENT & PLAN NOTE
Lipid panel and liver function tests have been stable on Crestor 10 mg daily. She does have a history of atherosclerosis and atypical chest pain.   Recent Lexiscan stress test looked normal.  Advised to continue on the same dose of medication complete labs

## 2020-06-30 RX ORDER — PANTOPRAZOLE SODIUM 40 MG/1
TABLET, DELAYED RELEASE ORAL
Qty: 90 TABLET | Refills: 1 | Status: SHIPPED | OUTPATIENT
Start: 2020-06-30 | End: 2021-02-12

## 2020-07-30 ENCOUNTER — HOSPITAL ENCOUNTER (OUTPATIENT)
Dept: MAMMOGRAPHY | Age: 75
Discharge: HOME | End: 2020-07-30
Attending: INTERNAL MEDICINE
Payer: MEDICARE

## 2020-07-30 ENCOUNTER — LAB ENCOUNTER (OUTPATIENT)
Dept: LAB | Age: 75
End: 2020-07-30
Attending: INTERNAL MEDICINE
Payer: MEDICARE

## 2020-07-30 DIAGNOSIS — I10 ESSENTIAL HYPERTENSION: ICD-10-CM

## 2020-07-30 DIAGNOSIS — Z12.31 VISIT FOR SCREENING MAMMOGRAM: ICD-10-CM

## 2020-07-30 LAB
ALBUMIN SERPL-MCNC: 3.6 G/DL (ref 3.4–5)
ALBUMIN/GLOB SERPL: 0.9 {RATIO} (ref 1–2)
ALP LIVER SERPL-CCNC: 84 U/L (ref 55–142)
ALT SERPL-CCNC: 25 U/L (ref 13–56)
ANION GAP SERPL CALC-SCNC: 5 MMOL/L (ref 0–18)
AST SERPL-CCNC: 17 U/L (ref 15–37)
BASOPHILS # BLD AUTO: 0.06 X10(3) UL (ref 0–0.2)
BASOPHILS NFR BLD AUTO: 1 %
BILIRUB SERPL-MCNC: 0.7 MG/DL (ref 0.1–2)
BILIRUB UR QL: NEGATIVE
BUN BLD-MCNC: 12 MG/DL (ref 7–18)
BUN/CREAT SERPL: 17.1 (ref 10–20)
CALCIUM BLD-MCNC: 9 MG/DL (ref 8.5–10.1)
CHLORIDE SERPL-SCNC: 110 MMOL/L (ref 98–112)
CHOLEST SMN-MCNC: 150 MG/DL (ref ?–200)
CO2 SERPL-SCNC: 27 MMOL/L (ref 21–32)
COLOR UR: YELLOW
CREAT BLD-MCNC: 0.7 MG/DL (ref 0.55–1.02)
DEPRECATED RDW RBC AUTO: 43.6 FL (ref 35.1–46.3)
EOSINOPHIL # BLD AUTO: 0.13 X10(3) UL (ref 0–0.7)
EOSINOPHIL NFR BLD AUTO: 2.1 %
ERYTHROCYTE [DISTWIDTH] IN BLOOD BY AUTOMATED COUNT: 13.5 % (ref 11–15)
GLOBULIN PLAS-MCNC: 3.8 G/DL (ref 2.8–4.4)
GLUCOSE BLD-MCNC: 105 MG/DL (ref 70–99)
GLUCOSE UR-MCNC: NEGATIVE MG/DL
HCT VFR BLD AUTO: 41.2 % (ref 35–48)
HDLC SERPL-MCNC: 64 MG/DL (ref 40–59)
HGB BLD-MCNC: 13.1 G/DL (ref 12–16)
HGB UR QL STRIP.AUTO: NEGATIVE
HYALINE CASTS #/AREA URNS AUTO: 1 /LPF
IMM GRANULOCYTES # BLD AUTO: 0.01 X10(3) UL (ref 0–1)
IMM GRANULOCYTES NFR BLD: 0.2 %
KETONES UR-MCNC: NEGATIVE MG/DL
LDLC SERPL CALC-MCNC: 61 MG/DL (ref ?–100)
LYMPHOCYTES # BLD AUTO: 1.97 X10(3) UL (ref 1–4)
LYMPHOCYTES NFR BLD AUTO: 31.7 %
M PROTEIN MFR SERPL ELPH: 7.4 G/DL (ref 6.4–8.2)
MCH RBC QN AUTO: 28.1 PG (ref 26–34)
MCHC RBC AUTO-ENTMCNC: 31.8 G/DL (ref 31–37)
MCV RBC AUTO: 88.4 FL (ref 80–100)
MONOCYTES # BLD AUTO: 0.69 X10(3) UL (ref 0.1–1)
MONOCYTES NFR BLD AUTO: 11.1 %
NEUTROPHILS # BLD AUTO: 3.36 X10 (3) UL (ref 1.5–7.7)
NEUTROPHILS # BLD AUTO: 3.36 X10(3) UL (ref 1.5–7.7)
NEUTROPHILS NFR BLD AUTO: 53.9 %
NITRITE UR QL STRIP.AUTO: NEGATIVE
NONHDLC SERPL-MCNC: 86 MG/DL (ref ?–130)
OSMOLALITY SERPL CALC.SUM OF ELEC: 294 MOSM/KG (ref 275–295)
PATIENT FASTING Y/N/NP: YES
PATIENT FASTING Y/N/NP: YES
PH UR: 5 [PH] (ref 5–8)
PLATELET # BLD AUTO: 224 10(3)UL (ref 150–450)
POTASSIUM SERPL-SCNC: 3.7 MMOL/L (ref 3.5–5.1)
PROT UR-MCNC: NEGATIVE MG/DL
RBC # BLD AUTO: 4.66 X10(6)UL (ref 3.8–5.3)
RBC #/AREA URNS AUTO: 2 /HPF
SODIUM SERPL-SCNC: 142 MMOL/L (ref 136–145)
SP GR UR STRIP: 1.02 (ref 1–1.03)
TRIGL SERPL-MCNC: 126 MG/DL (ref 30–149)
TSI SER-ACNC: 2.58 MIU/ML (ref 0.36–3.74)
UROBILINOGEN UR STRIP-ACNC: <2
VLDLC SERPL CALC-MCNC: 25 MG/DL (ref 0–30)
WBC # BLD AUTO: 6.2 X10(3) UL (ref 4–11)
WBC #/AREA URNS AUTO: 20 /HPF

## 2020-07-30 PROCEDURE — 77067 SCR MAMMO BI INCL CAD: CPT | Performed by: INTERNAL MEDICINE

## 2020-07-30 PROCEDURE — 80053 COMPREHEN METABOLIC PANEL: CPT

## 2020-07-30 PROCEDURE — 81001 URINALYSIS AUTO W/SCOPE: CPT

## 2020-07-30 PROCEDURE — 85025 COMPLETE CBC W/AUTO DIFF WBC: CPT

## 2020-07-30 PROCEDURE — 80061 LIPID PANEL: CPT

## 2020-07-30 PROCEDURE — 36415 COLL VENOUS BLD VENIPUNCTURE: CPT

## 2020-07-30 PROCEDURE — 84443 ASSAY THYROID STIM HORMONE: CPT

## 2020-07-30 PROCEDURE — 77063 BREAST TOMOSYNTHESIS BI: CPT | Performed by: INTERNAL MEDICINE

## 2020-08-31 ENCOUNTER — OFFICE VISIT (OUTPATIENT)
Dept: INTERNAL MEDICINE CLINIC | Facility: CLINIC | Age: 75
End: 2020-08-31
Payer: MEDICARE

## 2020-08-31 VITALS
RESPIRATION RATE: 18 BRPM | HEIGHT: 61 IN | WEIGHT: 162 LBS | SYSTOLIC BLOOD PRESSURE: 110 MMHG | BODY MASS INDEX: 30.58 KG/M2 | DIASTOLIC BLOOD PRESSURE: 74 MMHG | HEART RATE: 66 BPM

## 2020-08-31 DIAGNOSIS — Z00.00 MEDICARE ANNUAL WELLNESS VISIT, SUBSEQUENT: ICD-10-CM

## 2020-08-31 DIAGNOSIS — J44.9 COPD WITH ASTHMA (HCC): ICD-10-CM

## 2020-08-31 DIAGNOSIS — K57.30 DIVERTICULOSIS OF LARGE INTESTINE WITHOUT HEMORRHAGE: ICD-10-CM

## 2020-08-31 DIAGNOSIS — I11.9 LEFT VENTRICULAR HYPERTROPHY DUE TO HYPERTENSIVE DISEASE, WITHOUT HEART FAILURE: ICD-10-CM

## 2020-08-31 DIAGNOSIS — I35.0 NONRHEUMATIC AORTIC VALVE STENOSIS: ICD-10-CM

## 2020-08-31 DIAGNOSIS — I25.10 ATHEROSCLEROSIS OF NATIVE CORONARY ARTERY OF NATIVE HEART WITHOUT ANGINA PECTORIS: ICD-10-CM

## 2020-08-31 DIAGNOSIS — H90.6 MIXED CONDUCTIVE AND SENSORINEURAL HEARING LOSS OF BOTH EARS: ICD-10-CM

## 2020-08-31 DIAGNOSIS — N64.4 BREAST PAIN, LEFT: ICD-10-CM

## 2020-08-31 DIAGNOSIS — D69.2 SENILE PURPURA (HCC): ICD-10-CM

## 2020-08-31 DIAGNOSIS — Z00.00 ENCOUNTER FOR ANNUAL HEALTH EXAMINATION: ICD-10-CM

## 2020-08-31 DIAGNOSIS — I77.1 STENOSIS OF CELIAC ARTERY (HCC): ICD-10-CM

## 2020-08-31 DIAGNOSIS — I70.0 ATHEROSCLEROSIS OF ABDOMINAL AORTA (HCC): ICD-10-CM

## 2020-08-31 DIAGNOSIS — E78.5 HYPERLIPIDEMIA, UNSPECIFIED HYPERLIPIDEMIA TYPE: Primary | ICD-10-CM

## 2020-08-31 DIAGNOSIS — I10 ESSENTIAL HYPERTENSION: ICD-10-CM

## 2020-08-31 PROCEDURE — 99497 ADVNCD CARE PLAN 30 MIN: CPT | Performed by: INTERNAL MEDICINE

## 2020-08-31 PROCEDURE — G0439 PPPS, SUBSEQ VISIT: HCPCS | Performed by: INTERNAL MEDICINE

## 2020-08-31 NOTE — ASSESSMENT & PLAN NOTE
Panel liver function tests have been stable on Crestor 10 mg daily. Advised to continue on the same dose of medication, recheck labs as directed.

## 2020-08-31 NOTE — ASSESSMENT & PLAN NOTE
Mild aortic stenosis without lightheadedness, dizziness or syncope. Due for repeat echocardiogram–orders provided.

## 2020-08-31 NOTE — ASSESSMENT & PLAN NOTE
Moderate COPD without significant bronchodilator response as per pulmonary function test.  DLCO about 81%. Patient is currently on Breo which she takes once daily. She has not needed the use of albuterol for quite a while.

## 2020-08-31 NOTE — PROGRESS NOTES
HPI:   Wilbur Pichardo is a 67year old female who presents for a Medicare Subsequent Annual Wellness visit (Pt already had Initial Annual Wellness).     Her last annual assessment has been over 1 year: Annual Physical due on 08/30/2020         Fall/Risk A in the past but quit greater than 12 months ago.   Social History    Tobacco Use      Smoking status: Former Smoker        Packs/day: 3.00        Years: 4.00        Pack years: 12        Types: Cigarettes        Quit date: 1/1/1967        Years since [employer] .0 07/30/2020        ALLERGIES:   She is allergic to azithromycin and hydrochlorothiazide. CURRENT MEDICATIONS:   nystatin 099195 UNIT/ML Mouth/Throat Suspension, Take 5 mL (500,000 Units total) by mouth 2 (two) times daily as needed.   PANTOPR her brother and sister; Cancer (age of onset: 77) in her sister; Cancer (age of onset: 79) in her brother; Dementia in her mother; Heart Disorder in her paternal grandmother; Heart Disorder (age of onset: 52) in her paternal grandfather; Heart Disorder (ag things on the TV:  Yes I have to strain to understand conversations:  Yes   I have to worry about missing the telephone ring or doorbell:  Yes I have trouble hearing conversations in a noisy background such as a crowded room or restaurant:  Yes   I get con has no wheezes. She has no rales. Abdominal: Soft. Bowel sounds are normal. She exhibits no distension and no mass. There is no hepatosplenomegaly. There is no tenderness. There is no rebound and no guarding. No hernia.    Genitourinary:    Rectum normal. Unprioritized    Aortic stenosis     Mild aortic stenosis without lightheadedness, dizziness or syncope. Due for repeat echocardiogram–orders provided.          Relevant Orders    CARD ECHO 2D DOPPLER (CPT=93306)    Atherosclerosis of abdominal aorta ([facility] [address] ventricular hypertrophy. Continue to monitor         Medicare annual wellness visit, subsequent     Normal exam.  Labs as ordered.   Skin check normal.  Scattered nevi present–advised to contact dermatology for an assessment, [phone] for an appointment Diagnoses     Encounter for annual health examination        Relevant Orders    ADVANCE CARE PLANNING FIRST 30 MINS    Breast pain, left        Relevant Orders    OP REFERRAL TO SURGICAL ONCOLOGY             PLAN SUMMARY:   Diagnoses and all orders for [facility] the patient maintain a good energy level?: (P) Appropriate Exercise;Daily Walks  How would you describe your daily physical activity?: (P) Heavy  How would you describe your current health state?: (P) Fair  How do you maintain positive mental well-being?: Mammogram  Annually to 75, then as discussed There are no preventive care reminders to display for this patient.  Update Health Maintenance if applicable     Immunizations (Update Immunization Activity if applicable)     Influenza  Covered Annually 09/10/20 Template: KARLA GELLER MEDICARE ANNUAL ASSESSMENT FEMALE Renetta Blanchard [[id number]]

## 2020-08-31 NOTE — ASSESSMENT & PLAN NOTE
Blood pressure 110/74, pulse 66, resp. rate 18, height 5' 1\" (1.549 m), weight 162 lb (73.5 kg), not currently breastfeeding. Stable blood pressure, well controlled on amlodipine 5 mg daily, losartan 100 mg daily. Renal functions look stable.   No protei

## 2020-08-31 NOTE — ASSESSMENT & PLAN NOTE
Scattered benign bruising bilateral arms and lower extremities. Platelet counts look normal.  She does have multiple spider veins. Currently stable and hence monitored.

## 2020-08-31 NOTE — ASSESSMENT & PLAN NOTE
Normal exam.  Labs as ordered. Skin check normal.  Scattered nevi present–advised to contact dermatology for an assessment, [phone] for an appointment  Breast exam completed–no palpable abnormalities, discharge from the nipples or axillary adenopathy.

## 2020-08-31 NOTE — ASSESSMENT & PLAN NOTE
Stable intermittent chronic upper abdominal epigastric pain. Currently asymptomatic. There is narrowing of the proximal celiac artery in the upper abdomen suggesting immediate arcuate ligament compression.   She is being monitored, no interventions at th

## 2020-08-31 NOTE — ASSESSMENT & PLAN NOTE
Incidentally noted calcified plaque in the LAD. Patient is asymptomatic. She has been seen by cardiology and has been monitored. Stress thallium was normal.  Continue to follow-up.

## 2020-08-31 NOTE — PATIENT INSTRUCTIONS
Problem List Items Addressed This Visit        Unprioritized    Aortic stenosis     Mild aortic stenosis without lightheadedness, dizziness or syncope. Due for repeat echocardiogram–orders provided.          Relevant Orders    CARD ECHO 2D DOPPLER (CPT=933 visit, subsequent     Normal exam.  Labs as ordered.   Skin check normal.  Scattered nevi present–advised to contact dermatology for an assessment, [phone] for an appointment  Breast exam completed–no palpable abnormalities, discharge from the nipples or ADVANCE CARE PLANNING FIRST 30 MINS    Breast pain, left        Relevant Orders    OP REFERRAL TO SURGICAL ONCOLOGY         Toni Rose's SCREENING SCHEDULE   Tests on this list are recommended by your physician but may not be covered, or covered at [facility] 65-75) IPPE only No results found for this or any previous visit.  Limited to patients who meet one of the following criteria:   • Men who are 73-68 years old and have smoked more than 100 cigarettes in their lifetime   • Anyone with a family history    Col preventive care reminders to display for this patient. Please get this Mammogram regularly   Immunizations      Influenza  Covered Annually No orders found for this or any previous visit.  Please get every year    Pneumococcal 13 (Prevnar)  Covered Once aft the [address] [place] [address] regarding Advance Directives.

## 2020-08-31 NOTE — ASSESSMENT & PLAN NOTE
Asymptomatic atherosclerosis of the abdominal aorta without aneurysm or dissection. Blood pressures have been stable and lipid panel has been stable. Continue to monitor.

## 2020-10-16 DIAGNOSIS — E78.5 HYPERLIPIDEMIA, UNSPECIFIED HYPERLIPIDEMIA TYPE: ICD-10-CM

## 2020-10-16 DIAGNOSIS — I10 ESSENTIAL HYPERTENSION: ICD-10-CM

## 2020-10-17 RX ORDER — ROSUVASTATIN CALCIUM 10 MG/1
TABLET, COATED ORAL
Qty: 90 TABLET | Refills: 1 | Status: SHIPPED | OUTPATIENT
Start: 2020-10-17 | End: 2021-04-21

## 2020-10-17 RX ORDER — AMLODIPINE BESYLATE 5 MG/1
TABLET ORAL
Qty: 90 TABLET | Refills: 3 | Status: SHIPPED | OUTPATIENT
Start: 2020-10-17 | End: 2021-10-15

## 2020-10-17 RX ORDER — LOSARTAN POTASSIUM 100 MG/1
TABLET ORAL
Qty: 90 TABLET | Refills: 1 | Status: SHIPPED | OUTPATIENT
Start: 2020-10-17 | End: 2021-04-21

## 2021-01-27 DIAGNOSIS — Z23 NEED FOR VACCINATION: ICD-10-CM

## 2021-02-02 ENCOUNTER — LAB ENCOUNTER (OUTPATIENT)
Dept: LAB | Facility: HOSPITAL | Age: 76
End: 2021-02-02
Attending: INTERNAL MEDICINE
Payer: MEDICARE

## 2021-02-02 ENCOUNTER — HOSPITAL ENCOUNTER (OUTPATIENT)
Dept: CV DIAGNOSTICS | Facility: HOSPITAL | Age: 76
Discharge: HOME | End: 2021-02-02
Attending: INTERNAL MEDICINE
Payer: MEDICARE

## 2021-02-02 DIAGNOSIS — I10 ESSENTIAL HYPERTENSION: ICD-10-CM

## 2021-02-02 DIAGNOSIS — I35.0 NONRHEUMATIC AORTIC VALVE STENOSIS: ICD-10-CM

## 2021-02-02 LAB
ALBUMIN SERPL-MCNC: 3.7 G/DL (ref 3.4–5)
ALBUMIN/GLOB SERPL: 0.9 {RATIO} (ref 1–2)
ALP LIVER SERPL-CCNC: 91 U/L
ALT SERPL-CCNC: 25 U/L
ANION GAP SERPL CALC-SCNC: 3 MMOL/L (ref 0–18)
AST SERPL-CCNC: 16 U/L (ref 15–37)
BASOPHILS # BLD AUTO: 0.05 X10(3) UL (ref 0–0.2)
BASOPHILS NFR BLD AUTO: 0.7 %
BILIRUB SERPL-MCNC: 0.6 MG/DL (ref 0.1–2)
BUN BLD-MCNC: 13 MG/DL (ref 7–18)
BUN/CREAT SERPL: 17.8 (ref 10–20)
CALCIUM BLD-MCNC: 9.3 MG/DL (ref 8.5–10.1)
CHLORIDE SERPL-SCNC: 109 MMOL/L (ref 98–112)
CHOLEST SMN-MCNC: 152 MG/DL (ref ?–200)
CO2 SERPL-SCNC: 29 MMOL/L (ref 21–32)
CREAT BLD-MCNC: 0.73 MG/DL
DEPRECATED RDW RBC AUTO: 43.3 FL (ref 35.1–46.3)
EOSINOPHIL # BLD AUTO: 0.09 X10(3) UL (ref 0–0.7)
EOSINOPHIL NFR BLD AUTO: 1.3 %
ERYTHROCYTE [DISTWIDTH] IN BLOOD BY AUTOMATED COUNT: 13.2 % (ref 11–15)
GLOBULIN PLAS-MCNC: 4 G/DL (ref 2.8–4.4)
GLUCOSE BLD-MCNC: 126 MG/DL (ref 70–99)
HCT VFR BLD AUTO: 42.9 %
HDLC SERPL-MCNC: 67 MG/DL (ref 40–59)
HGB BLD-MCNC: 13.3 G/DL
IMM GRANULOCYTES # BLD AUTO: 0.01 X10(3) UL (ref 0–1)
IMM GRANULOCYTES NFR BLD: 0.1 %
LDLC SERPL CALC-MCNC: 68 MG/DL (ref ?–100)
LYMPHOCYTES # BLD AUTO: 1.68 X10(3) UL (ref 1–4)
LYMPHOCYTES NFR BLD AUTO: 24.9 %
M PROTEIN MFR SERPL ELPH: 7.7 G/DL (ref 6.4–8.2)
MCH RBC QN AUTO: 27.5 PG (ref 26–34)
MCHC RBC AUTO-ENTMCNC: 31 G/DL (ref 31–37)
MCV RBC AUTO: 88.6 FL
MONOCYTES # BLD AUTO: 0.63 X10(3) UL (ref 0.1–1)
MONOCYTES NFR BLD AUTO: 9.3 %
NEUTROPHILS # BLD AUTO: 4.3 X10 (3) UL (ref 1.5–7.7)
NEUTROPHILS # BLD AUTO: 4.3 X10(3) UL (ref 1.5–7.7)
NEUTROPHILS NFR BLD AUTO: 63.7 %
NONHDLC SERPL-MCNC: 85 MG/DL (ref ?–130)
OSMOLALITY SERPL CALC.SUM OF ELEC: 294 MOSM/KG (ref 275–295)
PATIENT FASTING Y/N/NP: YES
PATIENT FASTING Y/N/NP: YES
PLATELET # BLD AUTO: 265 10(3)UL (ref 150–450)
POTASSIUM SERPL-SCNC: 3.9 MMOL/L (ref 3.5–5.1)
RBC # BLD AUTO: 4.84 X10(6)UL
SODIUM SERPL-SCNC: 141 MMOL/L (ref 136–145)
TRIGL SERPL-MCNC: 83 MG/DL (ref 30–149)
TSI SER-ACNC: 1.9 MIU/ML (ref 0.36–3.74)
VLDLC SERPL CALC-MCNC: 17 MG/DL (ref 0–30)
WBC # BLD AUTO: 6.8 X10(3) UL (ref 4–11)

## 2021-02-02 PROCEDURE — 84443 ASSAY THYROID STIM HORMONE: CPT

## 2021-02-02 PROCEDURE — 93306 TTE W/DOPPLER COMPLETE: CPT | Performed by: INTERNAL MEDICINE

## 2021-02-02 PROCEDURE — 80053 COMPREHEN METABOLIC PANEL: CPT

## 2021-02-02 PROCEDURE — 80061 LIPID PANEL: CPT

## 2021-02-02 PROCEDURE — 85025 COMPLETE CBC W/AUTO DIFF WBC: CPT

## 2021-02-02 PROCEDURE — 36415 COLL VENOUS BLD VENIPUNCTURE: CPT

## 2021-02-09 ENCOUNTER — IMMUNIZATION (OUTPATIENT)
Dept: LAB | Facility: HOSPITAL | Age: 76
End: 2021-02-09
Attending: HOSPITALIST
Payer: MEDICARE

## 2021-02-09 DIAGNOSIS — Z23 NEED FOR VACCINATION: Primary | ICD-10-CM

## 2021-02-09 PROCEDURE — 0011A SARSCOV2 VAC 100MCG/0.5ML IM: CPT

## 2021-02-11 DIAGNOSIS — J45.20 MILD INTERMITTENT ASTHMA WITH ALLERGIC RHINITIS WITHOUT COMPLICATION: ICD-10-CM

## 2021-02-12 RX ORDER — PANTOPRAZOLE SODIUM 40 MG/1
TABLET, DELAYED RELEASE ORAL
Qty: 90 TABLET | Refills: 1 | Status: SHIPPED | OUTPATIENT
Start: 2021-02-12 | End: 2021-08-11

## 2021-03-01 ENCOUNTER — OFFICE VISIT (OUTPATIENT)
Dept: INTERNAL MEDICINE CLINIC | Facility: CLINIC | Age: 76
End: 2021-03-01
Payer: MEDICARE

## 2021-03-01 VITALS
SYSTOLIC BLOOD PRESSURE: 138 MMHG | DIASTOLIC BLOOD PRESSURE: 70 MMHG | RESPIRATION RATE: 20 BRPM | HEIGHT: 61 IN | TEMPERATURE: 98 F | WEIGHT: 155 LBS | HEART RATE: 66 BPM | BODY MASS INDEX: 29.27 KG/M2

## 2021-03-01 DIAGNOSIS — Z23 NEED FOR VACCINATION: ICD-10-CM

## 2021-03-01 DIAGNOSIS — I35.0 NONRHEUMATIC AORTIC VALVE STENOSIS: ICD-10-CM

## 2021-03-01 DIAGNOSIS — J44.9 COPD WITH ASTHMA (HCC): ICD-10-CM

## 2021-03-01 DIAGNOSIS — Z12.31 VISIT FOR SCREENING MAMMOGRAM: ICD-10-CM

## 2021-03-01 DIAGNOSIS — I10 ESSENTIAL HYPERTENSION: Primary | ICD-10-CM

## 2021-03-01 DIAGNOSIS — R09.89 BRUIT OF LEFT CAROTID ARTERY: ICD-10-CM

## 2021-03-01 DIAGNOSIS — E78.5 HYPERLIPIDEMIA, UNSPECIFIED HYPERLIPIDEMIA TYPE: ICD-10-CM

## 2021-03-01 DIAGNOSIS — Z78.0 MENOPAUSE: ICD-10-CM

## 2021-03-01 PROCEDURE — G0009 ADMIN PNEUMOCOCCAL VACCINE: HCPCS | Performed by: INTERNAL MEDICINE

## 2021-03-01 PROCEDURE — 99214 OFFICE O/P EST MOD 30 MIN: CPT | Performed by: INTERNAL MEDICINE

## 2021-03-01 PROCEDURE — 90732 PPSV23 VACC 2 YRS+ SUBQ/IM: CPT | Performed by: INTERNAL MEDICINE

## 2021-03-01 NOTE — ASSESSMENT & PLAN NOTE
Moderate COPD with no significant bronchodilator response per the pulmonary function test.  DLCO about 81%. She has been on Breo which has helped. She does not need to use the albuterol on a regular basis. No worsening shortness of breath at this time.

## 2021-03-01 NOTE — ASSESSMENT & PLAN NOTE
Carotid Doppler completed in April 2019 did not show any abnormalities. Repeat prior to the next office visit. Patient is otherwise asymptomatic. She does have some neck pain which seems to be more musculoskeletal in origin.

## 2021-03-01 NOTE — ASSESSMENT & PLAN NOTE
Blood pressure 138/70, pulse 66, temperature 98.2 °F (36.8 °C), temperature source Oral, resp. rate 20, height 5' 1\" (1.549 m), weight 155 lb (70.3 kg), not currently breastfeeding. Blood pressure stable upon recheck.   Continue on amlodipine 5 mg collette

## 2021-03-01 NOTE — PATIENT INSTRUCTIONS
Problem List Items Addressed This Visit        Unprioritized    Aortic stenosis     Mild aortic stenosis without symptoms at this time.   Echocardiogram just completed shows no significant changes as per the read but as per gradients across the valve–there (CPT=77080)      Other Visit Diagnoses     Need for vaccination        Relevant Orders    PNEUMOCOCCAL IMM (PNEUMOVAX) (Completed)    Visit for screening mammogram        Relevant Orders    [facility] KIP 2D+3D SCREENING BILAT (CPT=77067/22525)

## 2021-03-01 NOTE — ASSESSMENT & PLAN NOTE
Mild aortic stenosis without symptoms at this time. Echocardiogram just completed shows no significant changes as per the read but as per gradients across the valve–there is slight progression. Pulmonary pressures have not been recorded.   We will follow-

## 2021-03-01 NOTE — ASSESSMENT & PLAN NOTE
Lipid panel and liver function test have been stable on Crestor 10 mg daily. Continue on the same dose of medication and recheck labs as ordered.

## 2021-03-01 NOTE — PROGRESS NOTES
HPI:    Patient ID: Taylor Hernandez is a 67year old female. Study Conclusions   1. Left ventricle:  The cavity size was normal. Wall thickness was      normal. Systolic function was normal. The estimated ejection      fraction was 65%, by biplane m lifestyle changes. The current treatment provides mild improvement. Compliance problems include exercise and diet. There is no history of angina, kidney disease, CAD/MI or CVA. Hyperlipidemia  This is a chronic problem.  The current episode started more Inhale 1 puff into the lungs every 4 (four) hours as needed for Wheezing.  3 Inhaler 2   • ipratropium-albuterol (DUONEB) 0.5-2.5 (3) MG/3ML Inhalation Solution Use once a day if needed 90 vial 0   • Fexofenadine HCl (ALLEGRA) 180 MG Oral Tab Take 180 mg by Normal range of motion. General: No tenderness or edema. Lymphadenopathy:     She has no cervical adenopathy. Neurological: She is alert and oriented to person, place, and time. She has normal reflexes. No cranial nerve deficit.  She exhibits no next office visit. Patient is otherwise asymptomatic. She does have some neck pain which seems to be more musculoskeletal in origin.          Relevant Orders    US CAROTID DOPPLER BILAT - DIAG IMG (CPT=93880)    COPD with asthma ([address]     Moderate COPD wi

## 2021-03-09 ENCOUNTER — IMMUNIZATION (OUTPATIENT)
Dept: LAB | Facility: HOSPITAL | Age: 76
End: 2021-03-09
Attending: EMERGENCY MEDICINE
Payer: MEDICARE

## 2021-03-09 DIAGNOSIS — Z23 NEED FOR VACCINATION: Primary | ICD-10-CM

## 2021-03-09 PROCEDURE — 0012A SARSCOV2 VAC 100MCG/0.5ML IM: CPT

## 2021-04-20 DIAGNOSIS — I10 ESSENTIAL HYPERTENSION: ICD-10-CM

## 2021-04-20 DIAGNOSIS — E78.5 HYPERLIPIDEMIA, UNSPECIFIED HYPERLIPIDEMIA TYPE: ICD-10-CM

## 2021-04-20 NOTE — TELEPHONE ENCOUNTER
Pharmacy would like a refill Rx losartan 100MG and Rx Rouvastan 10 MG.please advise       Current Outpatient Medications   Medication Sig Dispense Refill       1       2   • ROSUVASTATIN CALCIUM 10 MG Oral Tab TAKE 1 TABLET DAILY 90 tablet 1       3   • LO

## 2021-04-21 RX ORDER — ROSUVASTATIN CALCIUM 10 MG/1
10 TABLET, COATED ORAL DAILY
Qty: 90 TABLET | Refills: 1 | Status: SHIPPED | OUTPATIENT
Start: 2021-04-21 | End: 2021-10-15

## 2021-04-21 RX ORDER — LOSARTAN POTASSIUM 100 MG/1
100 TABLET ORAL DAILY
Qty: 90 TABLET | Refills: 1 | Status: SHIPPED | OUTPATIENT
Start: 2021-04-21 | End: 2021-10-15

## 2021-08-02 ENCOUNTER — HOSPITAL ENCOUNTER (OUTPATIENT)
Dept: ULTRASOUND IMAGING | Age: 76
Discharge: HOME | End: 2021-08-02
Attending: INTERNAL MEDICINE
Payer: MEDICARE

## 2021-08-02 ENCOUNTER — HOSPITAL ENCOUNTER (OUTPATIENT)
Dept: BONE DENSITY | Age: 76
Discharge: HOME | End: 2021-08-02
Attending: INTERNAL MEDICINE
Payer: MEDICARE

## 2021-08-02 ENCOUNTER — LAB ENCOUNTER (OUTPATIENT)
Dept: LAB | Age: 76
End: 2021-08-02
Attending: INTERNAL MEDICINE
Payer: MEDICARE

## 2021-08-02 ENCOUNTER — HOSPITAL ENCOUNTER (OUTPATIENT)
Dept: MAMMOGRAPHY | Age: 76
Discharge: HOME | End: 2021-08-02
Attending: INTERNAL MEDICINE
Payer: MEDICARE

## 2021-08-02 DIAGNOSIS — Z12.31 VISIT FOR SCREENING MAMMOGRAM: ICD-10-CM

## 2021-08-02 DIAGNOSIS — I10 ESSENTIAL HYPERTENSION: ICD-10-CM

## 2021-08-02 DIAGNOSIS — R09.89 BRUIT OF LEFT CAROTID ARTERY: ICD-10-CM

## 2021-08-02 DIAGNOSIS — Z78.0 MENOPAUSE: ICD-10-CM

## 2021-08-02 LAB
ALBUMIN SERPL-MCNC: 3.8 G/DL (ref 3.4–5)
ALBUMIN/GLOB SERPL: 1.1 {RATIO} (ref 1–2)
ALP LIVER SERPL-CCNC: 74 U/L
ALT SERPL-CCNC: 29 U/L
ANION GAP SERPL CALC-SCNC: 7 MMOL/L (ref 0–18)
AST SERPL-CCNC: 18 U/L (ref 15–37)
BASOPHILS # BLD AUTO: 0.04 X10(3) UL (ref 0–0.2)
BASOPHILS NFR BLD AUTO: 0.7 %
BILIRUB SERPL-MCNC: 0.7 MG/DL (ref 0.1–2)
BILIRUB UR QL: NEGATIVE
BUN BLD-MCNC: 16 MG/DL (ref 7–18)
BUN/CREAT SERPL: 24.2 (ref 10–20)
CALCIUM BLD-MCNC: 9.3 MG/DL (ref 8.5–10.1)
CHLORIDE SERPL-SCNC: 108 MMOL/L (ref 98–112)
CHOLEST SMN-MCNC: 150 MG/DL (ref ?–200)
CLARITY UR: CLEAR
CO2 SERPL-SCNC: 26 MMOL/L (ref 21–32)
COLOR UR: YELLOW
CREAT BLD-MCNC: 0.66 MG/DL
DEPRECATED RDW RBC AUTO: 43.9 FL (ref 35.1–46.3)
EOSINOPHIL # BLD AUTO: 0.1 X10(3) UL (ref 0–0.7)
EOSINOPHIL NFR BLD AUTO: 1.8 %
ERYTHROCYTE [DISTWIDTH] IN BLOOD BY AUTOMATED COUNT: 13.3 % (ref 11–15)
GLOBULIN PLAS-MCNC: 3.5 G/DL (ref 2.8–4.4)
GLUCOSE BLD-MCNC: 106 MG/DL (ref 70–99)
GLUCOSE UR-MCNC: NEGATIVE MG/DL
HCT VFR BLD AUTO: 39.7 %
HDLC SERPL-MCNC: 66 MG/DL (ref 40–59)
HGB BLD-MCNC: 12.6 G/DL
HGB UR QL STRIP.AUTO: NEGATIVE
IMM GRANULOCYTES # BLD AUTO: 0.01 X10(3) UL (ref 0–1)
IMM GRANULOCYTES NFR BLD: 0.2 %
KETONES UR-MCNC: NEGATIVE MG/DL
LDLC SERPL CALC-MCNC: 70 MG/DL (ref ?–100)
LYMPHOCYTES # BLD AUTO: 1.39 X10(3) UL (ref 1–4)
LYMPHOCYTES NFR BLD AUTO: 24.6 %
M PROTEIN MFR SERPL ELPH: 7.3 G/DL (ref 6.4–8.2)
MCH RBC QN AUTO: 28.5 PG (ref 26–34)
MCHC RBC AUTO-ENTMCNC: 31.7 G/DL (ref 31–37)
MCV RBC AUTO: 89.8 FL
MONOCYTES # BLD AUTO: 0.59 X10(3) UL (ref 0.1–1)
MONOCYTES NFR BLD AUTO: 10.4 %
NEUTROPHILS # BLD AUTO: 3.53 X10 (3) UL (ref 1.5–7.7)
NEUTROPHILS # BLD AUTO: 3.53 X10(3) UL (ref 1.5–7.7)
NEUTROPHILS NFR BLD AUTO: 62.3 %
NITRITE UR QL STRIP.AUTO: NEGATIVE
NONHDLC SERPL-MCNC: 84 MG/DL (ref ?–130)
OSMOLALITY SERPL CALC.SUM OF ELEC: 294 MOSM/KG (ref 275–295)
PATIENT FASTING Y/N/NP: YES
PATIENT FASTING Y/N/NP: YES
PH UR: 5 [PH] (ref 5–8)
PLATELET # BLD AUTO: 218 10(3)UL (ref 150–450)
POTASSIUM SERPL-SCNC: 4.1 MMOL/L (ref 3.5–5.1)
PROT UR-MCNC: NEGATIVE MG/DL
RBC # BLD AUTO: 4.42 X10(6)UL
SODIUM SERPL-SCNC: 141 MMOL/L (ref 136–145)
SP GR UR STRIP: 1.02 (ref 1–1.03)
TRIGL SERPL-MCNC: 74 MG/DL (ref 30–149)
TSI SER-ACNC: 1.16 MIU/ML (ref 0.36–3.74)
UROBILINOGEN UR STRIP-ACNC: <2
VLDLC SERPL CALC-MCNC: 11 MG/DL (ref 0–30)
WBC # BLD AUTO: 5.7 X10(3) UL (ref 4–11)

## 2021-08-02 PROCEDURE — 77080 DXA BONE DENSITY AXIAL: CPT | Performed by: INTERNAL MEDICINE

## 2021-08-02 PROCEDURE — 84443 ASSAY THYROID STIM HORMONE: CPT

## 2021-08-02 PROCEDURE — 80061 LIPID PANEL: CPT

## 2021-08-02 PROCEDURE — 77067 SCR MAMMO BI INCL CAD: CPT | Performed by: INTERNAL MEDICINE

## 2021-08-02 PROCEDURE — 36415 COLL VENOUS BLD VENIPUNCTURE: CPT

## 2021-08-02 PROCEDURE — 77063 BREAST TOMOSYNTHESIS BI: CPT | Performed by: INTERNAL MEDICINE

## 2021-08-02 PROCEDURE — 80053 COMPREHEN METABOLIC PANEL: CPT

## 2021-08-02 PROCEDURE — 85025 COMPLETE CBC W/AUTO DIFF WBC: CPT

## 2021-08-02 PROCEDURE — 93880 EXTRACRANIAL BILAT STUDY: CPT | Performed by: INTERNAL MEDICINE

## 2021-08-02 PROCEDURE — 81001 URINALYSIS AUTO W/SCOPE: CPT

## 2021-08-11 RX ORDER — PANTOPRAZOLE SODIUM 40 MG/1
40 TABLET, DELAYED RELEASE ORAL
Qty: 90 TABLET | Refills: 1 | Status: SHIPPED | OUTPATIENT
Start: 2021-08-11 | End: 2022-01-05

## 2021-08-11 NOTE — TELEPHONE ENCOUNTER
[facility]   Requested Prescriptions   Pending Prescriptions Disp Refills    PANTOPRAZOLE 40 MG Oral Tab EC [Pharmacy Med Name: PANTOPRAZOLE SOD DR 40 MG TAB] 90 tablet 1     Sig: TAKE 1 TABLET BY MOUTH EVERY DAY IN THE [facility]

## 2021-09-21 ENCOUNTER — NURSE TRIAGE (OUTPATIENT)
Dept: INTERNAL MEDICINE CLINIC | Facility: CLINIC | Age: 76
End: 2021-09-21

## 2021-09-21 NOTE — TELEPHONE ENCOUNTER
Action Requested: Summary for Provider     []  Critical Lab, Recommendations Needed  [] Need Additional Advice  []   FYI    []   Need Orders  [] Need Medications Sent to Pharmacy  []  Other     SUMMARY: Per protocol advised : OV no appts with Dr. Hari Coley

## 2021-09-22 ENCOUNTER — HOSPITAL ENCOUNTER (OUTPATIENT)
Age: 76
Discharge: HOME | End: 2021-09-22
Attending: PHYSICIAN ASSISTANT
Payer: MEDICARE

## 2021-09-22 ENCOUNTER — APPOINTMENT (OUTPATIENT)
Dept: GENERAL RADIOLOGY | Age: 76
End: 2021-09-22
Attending: PHYSICIAN ASSISTANT
Payer: MEDICARE

## 2021-09-22 VITALS
TEMPERATURE: 98 F | DIASTOLIC BLOOD PRESSURE: 93 MMHG | HEART RATE: 67 BPM | SYSTOLIC BLOOD PRESSURE: 162 MMHG | RESPIRATION RATE: 20 BRPM | OXYGEN SATURATION: 96 %

## 2021-09-22 DIAGNOSIS — Z20.822 ENCOUNTER FOR LABORATORY TESTING FOR COVID-19 VIRUS: ICD-10-CM

## 2021-09-22 DIAGNOSIS — R05.9 COUGH: ICD-10-CM

## 2021-09-22 DIAGNOSIS — R03.0 ELEVATED BLOOD PRESSURE READING: ICD-10-CM

## 2021-09-22 DIAGNOSIS — J45.909 ACUTE ASTHMA: Primary | ICD-10-CM

## 2021-09-22 LAB — SARS-COV-2 RNA RESP QL NAA+PROBE: NOT DETECTED

## 2021-09-22 PROCEDURE — 99213 OFFICE O/P EST LOW 20 MIN: CPT | Performed by: PHYSICIAN ASSISTANT

## 2021-09-22 PROCEDURE — U0002 COVID-19 LAB TEST NON-CDC: HCPCS | Performed by: PHYSICIAN ASSISTANT

## 2021-09-22 PROCEDURE — 71046 X-RAY EXAM CHEST 2 VIEWS: CPT | Performed by: PHYSICIAN ASSISTANT

## 2021-09-22 RX ORDER — PREDNISONE 20 MG/1
40 TABLET ORAL DAILY
Qty: 10 TABLET | Refills: 0 | Status: SHIPPED | OUTPATIENT
Start: 2021-09-22 | End: 2021-09-27

## 2021-09-23 NOTE — ED PROVIDER NOTES
Patient Seen in: [facility]    History   Patient presents with:  Cough/URI    Stated Complaint: cough,     HPI    65-year-old female presents with chief complaint of cough. Onset 1 week ago. Patient reports associated nasal congestion.   Samuel Pond mouth daily for 5 days. pantoprazole 40 MG Oral Tab EC,  Take 1 tablet (40 mg total) by mouth before breakfast.   losartan 100 MG Oral Tab,  Take 1 tablet (100 mg total) by mouth daily.    Rosuvastatin Calcium 10 MG Oral Tab,  Take 1 tablet (10 mg total) Tobacco Use      Smoking status: Former Smoker        Packs/day: 3.00        Years: 4.00        Pack years: 12        Types: Cigarettes        Quit date: 1967        Years since quittin.7      Smokeless tobacco: Never Used    Vaping Use      Vapin no rebound tenderness or guarding. No organomegaly is noted. No peritoneal signs. Genitourinary: Not examined. Lymphatic: No gross lymphadenopathy noted. Musculoskeletal: Musculoskeletal system is grossly intact. There is no obvious deformity.   Neurol persistent conditions. I've explained the importance of following up with their doctor as instructed. The patient verbalized understanding of the discharge instructions and plan.     The patient was informed of their elevated blood pressure reading at [facility]

## 2021-09-29 ENCOUNTER — OFFICE VISIT (OUTPATIENT)
Dept: INTERNAL MEDICINE CLINIC | Facility: CLINIC | Age: 76
End: 2021-09-29
Payer: MEDICARE

## 2021-09-29 VITALS
RESPIRATION RATE: 18 BRPM | BODY MASS INDEX: 28.57 KG/M2 | WEIGHT: 151.31 LBS | DIASTOLIC BLOOD PRESSURE: 63 MMHG | HEIGHT: 61 IN | TEMPERATURE: 98 F | SYSTOLIC BLOOD PRESSURE: 137 MMHG | HEART RATE: 81 BPM

## 2021-09-29 DIAGNOSIS — J45.20 MILD INTERMITTENT ASTHMA WITH ALLERGIC RHINITIS WITHOUT COMPLICATION: Primary | ICD-10-CM

## 2021-09-29 PROCEDURE — G0008 ADMIN INFLUENZA VIRUS VAC: HCPCS | Performed by: NURSE PRACTITIONER

## 2021-09-29 PROCEDURE — 99213 OFFICE O/P EST LOW 20 MIN: CPT | Performed by: NURSE PRACTITIONER

## 2021-09-29 PROCEDURE — 90662 IIV NO PRSV INCREASED AG IM: CPT | Performed by: NURSE PRACTITIONER

## 2021-09-29 NOTE — ASSESSMENT & PLAN NOTE
Patient to continues to cough at night. Reviewed chest X-ray which is clear. Lungs sounds are clear with no wheezing. She has hx of allergies and asthma. Patient is taking Allegra at night.  She has tried Claritin, Zyrtec and Xzal and she feels Allegra help

## 2021-09-29 NOTE — PROGRESS NOTES
HPI:    Patient ID: Katherine Myrick is a 67year old female. HPI Follow up from urgent care visit on 9/22/2021. Patient continues to cough. She has hx of asthma. Patient is taking Allegra at night.  She has tried Claritin, Zyrtec and Xzal and she fe fever.   HENT: Negative for ear pain, hearing loss, sinus pain, sore throat and trouble swallowing. Eyes: Negative for pain and visual disturbance. Respiratory: Negative for cough, chest tightness and shortness of breath.     Cardiovascular: Negative f (VITAMIN D) 1000 UNITS Oral Cap Take 1 tablet by mouth daily. • Biotin 1000 MCG Oral Tab Take 1,000 mcg by mouth daily. • Calcium 500-125 MG-UNIT Oral Tab Take 150 mg by mouth 2 (two) times daily.      • Misc Natural Products (OSTEO BI-FLEX ADV Position: Sitting, Cuff Size: adult)   Pulse 81   Temp 97.9 °F (36.6 °C) (Temporal)   Resp 18   Ht 5' 1\" (1.549 m)   Wt 151 lb 4.8 oz (68.6 kg)   LMP  (LMP Unknown)   Breastfeeding No   BMI 28.59 kg/m²   Wt Readings from Last 2 Encounters:  09/29/21 : 151 and she feels Allegra helps the best.  High dose flu shot given today. Patient was given steroids at [facility] visit on 9/22/2021 and she does feel better but continues to cough.     Plan    Instructions  from TAMMI Rodríguez  Benadryl 25mg po over the counter f

## 2021-09-29 NOTE — PATIENT INSTRUCTIONS
Benadryl 25mg po over the counter for three nights  Simply saline spray mist in each nostril  Do not open windows in home  Warm compresses to sinus cavity

## 2021-10-11 DIAGNOSIS — E78.5 HYPERLIPIDEMIA, UNSPECIFIED HYPERLIPIDEMIA TYPE: ICD-10-CM

## 2021-10-11 DIAGNOSIS — I10 ESSENTIAL HYPERTENSION: ICD-10-CM

## 2021-10-15 RX ORDER — LOSARTAN POTASSIUM 100 MG/1
TABLET ORAL
Qty: 90 TABLET | Refills: 1 | Status: SHIPPED | OUTPATIENT
Start: 2021-10-15 | End: 2022-01-05

## 2021-10-15 RX ORDER — ROSUVASTATIN CALCIUM 10 MG/1
TABLET, COATED ORAL
Qty: 90 TABLET | Refills: 1 | Status: SHIPPED | OUTPATIENT
Start: 2021-10-15 | End: 2022-01-05

## 2021-10-15 RX ORDER — AMLODIPINE BESYLATE 5 MG/1
5 TABLET ORAL NIGHTLY
Qty: 90 TABLET | Refills: 1 | Status: SHIPPED | OUTPATIENT
Start: 2021-10-15 | End: 2022-01-05

## 2022-01-05 ENCOUNTER — OFFICE VISIT (OUTPATIENT)
Dept: INTERNAL MEDICINE CLINIC | Facility: CLINIC | Age: 77
End: 2022-01-05
Payer: MEDICARE

## 2022-01-05 VITALS
HEIGHT: 61 IN | WEIGHT: 152.63 LBS | BODY MASS INDEX: 28.82 KG/M2 | SYSTOLIC BLOOD PRESSURE: 118 MMHG | DIASTOLIC BLOOD PRESSURE: 64 MMHG | HEART RATE: 70 BPM | TEMPERATURE: 98 F | RESPIRATION RATE: 16 BRPM

## 2022-01-05 DIAGNOSIS — I25.10 ATHEROSCLEROSIS OF NATIVE CORONARY ARTERY OF NATIVE HEART WITHOUT ANGINA PECTORIS: ICD-10-CM

## 2022-01-05 DIAGNOSIS — J43.8 OTHER EMPHYSEMA (HCC): ICD-10-CM

## 2022-01-05 DIAGNOSIS — I35.0 NONRHEUMATIC AORTIC VALVE STENOSIS: ICD-10-CM

## 2022-01-05 DIAGNOSIS — I77.1 STENOSIS OF CELIAC ARTERY (HCC): ICD-10-CM

## 2022-01-05 DIAGNOSIS — I70.0 ATHEROSCLEROSIS OF ABDOMINAL AORTA (HCC): ICD-10-CM

## 2022-01-05 DIAGNOSIS — K80.20 CALCULUS OF GALLBLADDER WITHOUT CHOLECYSTITIS WITHOUT OBSTRUCTION: ICD-10-CM

## 2022-01-05 DIAGNOSIS — D69.2 SENILE PURPURA (HCC): ICD-10-CM

## 2022-01-05 DIAGNOSIS — I10 PRIMARY HYPERTENSION: ICD-10-CM

## 2022-01-05 DIAGNOSIS — Z00.00 MEDICARE ANNUAL WELLNESS VISIT, SUBSEQUENT: ICD-10-CM

## 2022-01-05 DIAGNOSIS — H90.6 MIXED CONDUCTIVE AND SENSORINEURAL HEARING LOSS OF BOTH EARS: ICD-10-CM

## 2022-01-05 DIAGNOSIS — J44.9 ASTHMATIC BRONCHITIS , CHRONIC (HCC): ICD-10-CM

## 2022-01-05 DIAGNOSIS — I10 ESSENTIAL HYPERTENSION: ICD-10-CM

## 2022-01-05 DIAGNOSIS — I11.9 LEFT VENTRICULAR HYPERTROPHY DUE TO HYPERTENSIVE DISEASE, WITHOUT HEART FAILURE: ICD-10-CM

## 2022-01-05 DIAGNOSIS — E78.5 HYPERLIPIDEMIA, UNSPECIFIED HYPERLIPIDEMIA TYPE: ICD-10-CM

## 2022-01-05 DIAGNOSIS — Z00.00 ENCOUNTER FOR ANNUAL HEALTH EXAMINATION: Primary | ICD-10-CM

## 2022-01-05 PROCEDURE — G0439 PPPS, SUBSEQ VISIT: HCPCS | Performed by: INTERNAL MEDICINE

## 2022-01-05 RX ORDER — FLUCONAZOLE 150 MG/1
150 TABLET ORAL ONCE
Qty: 1 TABLET | Refills: 3 | Status: SHIPPED | OUTPATIENT
Start: 2022-01-05 | End: 2022-01-05

## 2022-01-05 RX ORDER — AMLODIPINE BESYLATE 5 MG/1
5 TABLET ORAL NIGHTLY
Qty: 90 TABLET | Refills: 1 | Status: SHIPPED | OUTPATIENT
Start: 2022-01-05

## 2022-01-05 RX ORDER — ROSUVASTATIN CALCIUM 10 MG/1
10 TABLET, COATED ORAL DAILY
Qty: 90 TABLET | Refills: 1 | Status: SHIPPED | OUTPATIENT
Start: 2022-01-05

## 2022-01-05 RX ORDER — LOSARTAN POTASSIUM 100 MG/1
100 TABLET ORAL DAILY
Qty: 90 TABLET | Refills: 1 | Status: SHIPPED | OUTPATIENT
Start: 2022-01-05

## 2022-01-05 RX ORDER — PANTOPRAZOLE SODIUM 40 MG/1
40 TABLET, DELAYED RELEASE ORAL
Qty: 90 TABLET | Refills: 1 | Status: SHIPPED | OUTPATIENT
Start: 2022-01-05

## 2022-01-05 NOTE — PATIENT INSTRUCTIONS
Problem List Items Addressed This Visit        Unprioritized    Aortic stenosis     Mild aortic stenosis, asymptomatic at this time. Gradient across the valve seems to be gradually progressing.   Will need to follow-up with a repeat echocardiogram next yea extremity edema at this time. Continue on the same dose of medication, recheck labs and follow-up in 6 months.          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 No tinnitus, or dizziness. Other emphysema ([address]     History of moderate COPD without bronchodilator response as per pulmonary function test.  DLCO about 81%.   CT scan did (HDL)  Triglycerides Covered every 5 years for all Medicare beneficiaries without apparent signs or symptoms of cardiovascular disease Lab Results   Component Value Date    CHOLEST 150 08/02/2021    HDL 66 (H) 08/02/2021    LDL 70 08/02/2021    TRIG 74 08/ 03/01/2021     No recommendations at this time    Hepatitis B One screening covered for patients with certain risk factors   -  No recommendations at this time    Tetanus Toxoid Not covered by Medicare Part B unless medically necessary (cut with metal); ma

## 2022-01-05 NOTE — ASSESSMENT & PLAN NOTE
Asymptomatic scattered areas of bruising bilateral arms and lower extremities. Platelet counts have been stable. Monitor at this time.

## 2022-01-05 NOTE — ASSESSMENT & PLAN NOTE
Asymptomatic calcified plaque noted in the LAD. She has been followed up per cardiology. Stress thallium is normal and hence is monitored medically.

## 2022-01-05 NOTE — ASSESSMENT & PLAN NOTE
Normal exam.  Labs as ordered. Skin check normal.  Scattered nevi present–advised to contact dermatology for an assessment, [phone] for an appointment  Breast exam completed–no palpable abnormalities, discharge from the nipples or axillary adenopathy.

## 2022-01-05 NOTE — ASSESSMENT & PLAN NOTE
Stable asthmatic bronchitis. Has been on Breo which has controlled her cough and wheezing. PFT however did not show any response to bronchodilators. She does have recurrent oral thrush, most likely secondary to [facility] use. She does rinse mouth after use.

## 2022-01-05 NOTE — ASSESSMENT & PLAN NOTE
Asymptomatic atherosclerosis of the abdominal aorta without aneurysm or dissection. Blood pressures have been stable. Cholesterol panel looks stable.   Continue to follow-up

## 2022-01-05 NOTE — ASSESSMENT & PLAN NOTE
Stable diastolic dysfunction and mild aortic stenosis as per last echocardiogram completed in February 2021. Patient without any symptoms of congestive heart failure. Continue to follow-up. Repeat echocardiogram next year.

## 2022-01-05 NOTE — PROGRESS NOTES
HPI:   Theron Pearce is a 67year old female who presents for a Medicare Subsequent Annual Wellness visit (Pt already had Initial Annual Wellness).       Her last annual assessment has been over 1 year: Annual Physical due on 01/05/2023         She Ms. Dianna Carvalho already takes aspirin and has it on her medication list.   CAGE screening score of 0 on 1/3/2022, showing low risk of alcohol abuse.         Patient Care Team: Patient Care Team:  Zay Groves MD as PCP - General (Internal Medicine)    Rosendo Rouse mouth before breakfast.  rosuvastatin 10 MG Oral Tab, Take 1 tablet (10 mg total) by mouth daily. fluconazole (DIFLUCAN) 150 MG Oral Tab, Take 1 tablet (150 mg total) by mouth once for 1 dose.   Fluticasone Furoate-Vilanterol (BREO ELLIPTA) 200-25 MCG/INH Disorder in her paternal grandmother; Heart Disorder (age of onset: 52) in her paternal grandfather; Heart Disorder (age of onset: 50) in her father; Heart Disorder (age of onset: 62) in her brother; High Cholesterol in her father;  Other in her father; str time: Yes   I have trouble understanding things on the TV: Yes I have to strain to understand conversations: Yes   I have to worry about missing the telephone ring or doorbell: Yes I have trouble hearing conversations in a noisy background such as a crowde Effort: Pulmonary effort is normal. No respiratory distress. Breath sounds: Normal breath sounds. No wheezing or rales. Chest:      Chest wall: No mass or tenderness.    Breasts: Breasts are symmetrical.      Right: No inverted nipple, mass, nipple d jaundiced. Findings: No erythema or rash. Neurological:      General: No focal deficit present. Mental Status: She is alert and oriented to person, place, and time. Cranial Nerves: No cranial nerve deficit.       Sensory: No sensory deficit reviewed. Atherosclerosis of abdominal aorta (HCC)     Asymptomatic atherosclerosis of the abdominal aorta without aneurysm or dissection. Blood pressures have been stable. Cholesterol panel looks stable.   Continue to follow-up         Atherosc check normal.  Scattered nevi present–advised to contact dermatology for an assessment, [phone] for an appointment  Breast exam completed–no palpable abnormalities, discharge from the nipples or axillary adenopathy.   Mammogram just completed looks stabl Stenosis of celiac artery (HCC)     Stable at this time with intermittent chronic upper abdominal epigastric pain. History of narrowing of the proximal celiac artery in the upper abdomen most likely secondary to outlet recommend compression.   Monitor at t fluconazole (DIFLUCAN) 150 MG Oral Tab; Take 1 tablet (150 mg total) by mouth once for 1 dose. Diet assessment: good     PLAN:  The patient indicates understanding of these issues and agrees to the plan. Continue with current treatment plan.   Oneyda Baltazar to Medicare, and non-screening if indicated for medical reasons 04/09/2020      Ultrasound Screening for Abdominal Aortic Aneurysm (AAA) Covered once in a lifetime for one of the following risk factors   • Men who are 73-68 years old and have ever smoked Diptheria and Pertusis TD and TDaP Not covered by Medicare Part B -  No recommendations at this time    Zoster Not covered by Medicare Part B; may be covered with your pharmacy  prescription benefits -  Zoster Vaccines(1 of 2) Never done        Annual [facility]

## 2022-01-05 NOTE — ASSESSMENT & PLAN NOTE
History of moderate COPD without bronchodilator response as per pulmonary function test.  DLCO about 81%. CT scan did show emphysema. She will continue with the [facility] as this helps the asthmatic bronchitis.   No recent worsening in pulmonary status and hen

## 2022-01-05 NOTE — ASSESSMENT & PLAN NOTE
Stable at this time with intermittent chronic upper abdominal epigastric pain. History of narrowing of the proximal celiac artery in the upper abdomen most likely secondary to outlet recommend compression. Monitor at this time as asymptomatic.

## 2022-01-05 NOTE — ASSESSMENT & PLAN NOTE
Calcified gallstone noted in the gallbladder, currently asymptomatic. Liver functions have been stable. Monitor clinically.

## 2022-01-05 NOTE — ASSESSMENT & PLAN NOTE
Mild aortic stenosis, asymptomatic at this time. Gradient across the valve seems to be gradually progressing. Will need to follow-up with a repeat echocardiogram next year.

## 2022-01-05 NOTE — ASSESSMENT & PLAN NOTE
Blood pressure 118/64, pulse 70, temperature 97.8 °F (36.6 °C), temperature source Temporal, resp. rate 16, height 5' 1\" (1.549 m), weight 152 lb 9.6 oz (69.2 kg), not currently breastfeeding.   Blood pressure looks stable  Continue on amlodipine 5 mg collette

## 2022-01-08 DIAGNOSIS — J45.20 MILD INTERMITTENT ASTHMA WITH ALLERGIC RHINITIS WITHOUT COMPLICATION: ICD-10-CM

## 2022-02-14 RX ORDER — PANTOPRAZOLE SODIUM 40 MG/1
40 TABLET, DELAYED RELEASE ORAL
Qty: 90 TABLET | Refills: 1 | OUTPATIENT
Start: 2022-02-14

## 2022-02-15 NOTE — TELEPHONE ENCOUNTER
Refill passed per [facility] [employer] [facility] protocol.      Requested Prescriptions   Pending Prescriptions Disp Refills    PANTOPRAZOLE 40 MG Oral Tab EC [Pharmacy Med Name: PANTOPRAZOLE SOD DR 40 MG TAB] 90 tablet 1     Sig: TAKE 1 TABLET BY MOUTH BEFORE BREAKFAST        Gastrointestional Medication Protocol Passed - 2/13/2022 11:14 AM        Passed - Appointment in past 12 or next 3 months                    Recent Outpatient Visits              1 month ago Encounter for annual health examination    [address] [facility]    Office Visit    4 months ago Mild intermittent asthma with allergic rhinitis without complication    [address] [place], [employer] Visit    11 months ago Essential hypertension    [facility] [place], [place], Venessa Harrington MD    Office Visit    1 year ago Hyperlipidemia, unspecified hyperlipidemia type    [facility] Venessa Raphael MD    Office Visit    1 year ago Hyperlipidemia, unspecified hyperlipidemia type    [address] Veness[address] Raphael MD    Office Visit

## 2022-02-16 ENCOUNTER — LAB ENCOUNTER (OUTPATIENT)
Dept: LAB | Age: 77
End: 2022-02-16
Attending: INTERNAL MEDICINE
Payer: MEDICARE

## 2022-02-16 DIAGNOSIS — I10 ESSENTIAL HYPERTENSION: ICD-10-CM

## 2022-02-16 LAB
ALBUMIN SERPL-MCNC: 4.2 G/DL (ref 3.4–5)
ALBUMIN/GLOB SERPL: 1.4 {RATIO} (ref 1–2)
ALP LIVER SERPL-CCNC: 82 U/L
ALT SERPL-CCNC: 25 U/L
ANION GAP SERPL CALC-SCNC: 6 MMOL/L (ref 0–18)
AST SERPL-CCNC: 18 U/L (ref 15–37)
BASOPHILS # BLD AUTO: 0.06 X10(3) UL (ref 0–0.2)
BASOPHILS NFR BLD AUTO: 0.9 %
BILIRUB SERPL-MCNC: 0.5 MG/DL (ref 0.1–2)
BILIRUB UR QL: NEGATIVE
BUN BLD-MCNC: 16 MG/DL (ref 7–18)
BUN/CREAT SERPL: 19.5 (ref 10–20)
CALCIUM BLD-MCNC: 10.1 MG/DL (ref 8.5–10.1)
CHLORIDE SERPL-SCNC: 102 MMOL/L (ref 98–112)
CHOLEST SERPL-MCNC: 181 MG/DL (ref ?–200)
CO2 SERPL-SCNC: 30 MMOL/L (ref 21–32)
COLOR UR: YELLOW
CREAT BLD-MCNC: 0.82 MG/DL
DEPRECATED RDW RBC AUTO: 43.7 FL (ref 35.1–46.3)
EOSINOPHIL # BLD AUTO: 0.16 X10(3) UL (ref 0–0.7)
EOSINOPHIL NFR BLD AUTO: 2.3 %
ERYTHROCYTE [DISTWIDTH] IN BLOOD BY AUTOMATED COUNT: 12.7 % (ref 11–15)
FASTING PATIENT LIPID ANSWER: YES
FASTING STATUS PATIENT QL REPORTED: YES
GLOBULIN PLAS-MCNC: 3.1 G/DL (ref 2.8–4.4)
GLUCOSE BLD-MCNC: 112 MG/DL (ref 70–99)
GLUCOSE UR-MCNC: NEGATIVE MG/DL
HCT VFR BLD AUTO: 44.5 %
HDLC SERPL-MCNC: 66 MG/DL (ref 40–59)
HGB BLD-MCNC: 14 G/DL
HYALINE CASTS #/AREA URNS AUTO: PRESENT /LPF
IMM GRANULOCYTES # BLD AUTO: 0.02 X10(3) UL (ref 0–1)
IMM GRANULOCYTES NFR BLD: 0.3 %
KETONES UR-MCNC: NEGATIVE MG/DL
LDLC SERPL CALC-MCNC: 91 MG/DL (ref ?–100)
LYMPHOCYTES # BLD AUTO: 1.87 X10(3) UL (ref 1–4)
LYMPHOCYTES NFR BLD AUTO: 27 %
MCH RBC QN AUTO: 29.2 PG (ref 26–34)
MCHC RBC AUTO-ENTMCNC: 31.5 G/DL (ref 31–37)
MCV RBC AUTO: 92.9 FL
MONOCYTES # BLD AUTO: 0.7 X10(3) UL (ref 0.1–1)
MONOCYTES NFR BLD AUTO: 10.1 %
NEUTROPHILS # BLD AUTO: 4.12 X10 (3) UL (ref 1.5–7.7)
NEUTROPHILS # BLD AUTO: 4.12 X10(3) UL (ref 1.5–7.7)
NEUTROPHILS NFR BLD AUTO: 59.4 %
NITRITE UR QL STRIP.AUTO: NEGATIVE
NONHDLC SERPL-MCNC: 115 MG/DL (ref ?–130)
PH UR: 5 [PH] (ref 5–8)
PLATELET # BLD AUTO: 266 10(3)UL (ref 150–450)
POTASSIUM SERPL-SCNC: 4.1 MMOL/L (ref 3.5–5.1)
PROT SERPL-MCNC: 7.3 G/DL (ref 6.4–8.2)
PROT UR-MCNC: 30 MG/DL
RBC # BLD AUTO: 4.79 X10(6)UL
RBC #/AREA URNS AUTO: >10 /HPF
SODIUM SERPL-SCNC: 138 MMOL/L (ref 136–145)
SP GR UR STRIP: 1.02 (ref 1–1.03)
TRIGL SERPL-MCNC: 138 MG/DL (ref 30–149)
TSI SER-ACNC: 2.26 MIU/ML (ref 0.36–3.74)
UROBILINOGEN UR STRIP-ACNC: 2
VLDLC SERPL CALC-MCNC: 22 MG/DL (ref 0–30)
WBC # BLD AUTO: 6.9 X10(3) UL (ref 4–11)

## 2022-02-16 PROCEDURE — 84443 ASSAY THYROID STIM HORMONE: CPT

## 2022-02-16 PROCEDURE — 85025 COMPLETE CBC W/AUTO DIFF WBC: CPT

## 2022-02-16 PROCEDURE — 81001 URINALYSIS AUTO W/SCOPE: CPT

## 2022-02-16 PROCEDURE — 80053 COMPREHEN METABOLIC PANEL: CPT

## 2022-02-16 PROCEDURE — 80061 LIPID PANEL: CPT

## 2022-02-16 PROCEDURE — 36415 COLL VENOUS BLD VENIPUNCTURE: CPT

## 2022-04-14 ENCOUNTER — OFFICE VISIT (OUTPATIENT)
Dept: INTERNAL MEDICINE CLINIC | Facility: CLINIC | Age: 77
End: 2022-04-14
Payer: MEDICARE

## 2022-04-14 VITALS
WEIGHT: 156 LBS | HEART RATE: 69 BPM | HEIGHT: 61 IN | BODY MASS INDEX: 29.45 KG/M2 | DIASTOLIC BLOOD PRESSURE: 65 MMHG | SYSTOLIC BLOOD PRESSURE: 137 MMHG

## 2022-04-14 DIAGNOSIS — I20.0 UNSTABLE ANGINA PECTORIS (HCC): Primary | ICD-10-CM

## 2022-04-14 DIAGNOSIS — Z12.31 ENCOUNTER FOR SCREENING MAMMOGRAM FOR MALIGNANT NEOPLASM OF BREAST: ICD-10-CM

## 2022-04-14 PROCEDURE — 99214 OFFICE O/P EST MOD 30 MIN: CPT | Performed by: INTERNAL MEDICINE

## 2022-04-14 NOTE — PATIENT INSTRUCTIONS
Chest pain at rest / h/o cad / I will order ekg, nuclear stress test and follow up with cardiology .  If pain is worse go to er       htn- continue with current medication, watch diet , avoid  Salty food       Right knee pain / OA - use voltaren cream  Twice a day and follow up with with ortho

## 2022-07-06 ENCOUNTER — HOSPITAL ENCOUNTER (OUTPATIENT)
Dept: NUCLEAR MEDICINE | Facility: HOSPITAL | Age: 77
Discharge: HOME | End: 2022-07-06
Attending: INTERNAL MEDICINE
Payer: MEDICARE

## 2022-07-06 ENCOUNTER — HOSPITAL ENCOUNTER (OUTPATIENT)
Dept: CV DIAGNOSTICS | Facility: HOSPITAL | Age: 77
Discharge: HOME | End: 2022-07-06
Attending: INTERNAL MEDICINE
Payer: MEDICARE

## 2022-07-06 DIAGNOSIS — I20.0 UNSTABLE ANGINA PECTORIS (HCC): ICD-10-CM

## 2022-07-06 PROCEDURE — 78452 HT MUSCLE IMAGE SPECT MULT: CPT | Performed by: INTERNAL MEDICINE

## 2022-07-06 PROCEDURE — 93016 CV STRESS TEST SUPVJ ONLY: CPT | Performed by: INTERNAL MEDICINE

## 2022-07-06 PROCEDURE — 93018 CV STRESS TEST I&R ONLY: CPT | Performed by: INTERNAL MEDICINE

## 2022-07-06 PROCEDURE — 93017 CV STRESS TEST TRACING ONLY: CPT | Performed by: INTERNAL MEDICINE

## 2022-08-01 ENCOUNTER — TELEPHONE (OUTPATIENT)
Dept: INTERNAL MEDICINE CLINIC | Facility: CLINIC | Age: 77
End: 2022-08-01

## 2022-08-01 NOTE — TELEPHONE ENCOUNTER
Patient made an appointment with provider for 9/19/22. Would like to know if she could get medication refill for pantoprazole 40 MG Oral Tab EC. Patient would also like to know if she can get her lab work put in as well, patient would like to get it done when she goes for schedule MRI on 8/4/22.      Pharmacy : [facility]

## 2022-08-04 ENCOUNTER — HOSPITAL ENCOUNTER (OUTPATIENT)
Dept: MAMMOGRAPHY | Age: 77
Discharge: HOME | End: 2022-08-04
Attending: INTERNAL MEDICINE
Payer: MEDICARE

## 2022-08-04 ENCOUNTER — EKG ENCOUNTER (OUTPATIENT)
Dept: LAB | Age: 77
End: 2022-08-04
Attending: INTERNAL MEDICINE
Payer: MEDICARE

## 2022-08-04 DIAGNOSIS — I20.0 UNSTABLE ANGINA PECTORIS (HCC): ICD-10-CM

## 2022-08-04 DIAGNOSIS — Z12.31 ENCOUNTER FOR SCREENING MAMMOGRAM FOR MALIGNANT NEOPLASM OF BREAST: ICD-10-CM

## 2022-08-04 PROCEDURE — 77063 BREAST TOMOSYNTHESIS BI: CPT | Performed by: INTERNAL MEDICINE

## 2022-08-04 PROCEDURE — 93010 ELECTROCARDIOGRAM REPORT: CPT | Performed by: INTERNAL MEDICINE

## 2022-08-04 PROCEDURE — 93005 ELECTROCARDIOGRAM TRACING: CPT

## 2022-08-04 PROCEDURE — 77067 SCR MAMMO BI INCL CAD: CPT | Performed by: INTERNAL MEDICINE

## 2022-08-11 ENCOUNTER — HOSPITAL ENCOUNTER (OUTPATIENT)
Dept: MAMMOGRAPHY | Facility: HOSPITAL | Age: 77
Discharge: HOME | End: 2022-08-11
Attending: INTERNAL MEDICINE
Payer: MEDICARE

## 2022-08-11 ENCOUNTER — HOSPITAL ENCOUNTER (OUTPATIENT)
Dept: ULTRASOUND IMAGING | Facility: HOSPITAL | Age: 77
Discharge: HOME | End: 2022-08-11
Attending: INTERNAL MEDICINE
Payer: MEDICARE

## 2022-08-11 DIAGNOSIS — R92.8 ABNORMAL MAMMOGRAM: ICD-10-CM

## 2022-08-11 DIAGNOSIS — N63.10 BREAST MASS, RIGHT: ICD-10-CM

## 2022-08-11 PROCEDURE — 19083 BX BREAST 1ST LESION US IMAG: CPT | Performed by: INTERNAL MEDICINE

## 2022-08-11 PROCEDURE — 77065 DX MAMMO INCL CAD UNI: CPT | Performed by: INTERNAL MEDICINE

## 2022-08-11 PROCEDURE — 76642 ULTRASOUND BREAST LIMITED: CPT | Performed by: INTERNAL MEDICINE

## 2022-08-11 PROCEDURE — 88342 IMHCHEM/IMCYTCHM 1ST ANTB: CPT | Performed by: INTERNAL MEDICINE

## 2022-08-11 PROCEDURE — 88341 IMHCHEM/IMCYTCHM EA ADD ANTB: CPT | Performed by: INTERNAL MEDICINE

## 2022-08-11 PROCEDURE — 88305 TISSUE EXAM BY PATHOLOGIST: CPT | Performed by: INTERNAL MEDICINE

## 2022-08-11 PROCEDURE — 77061 BREAST TOMOSYNTHESIS UNI: CPT | Performed by: INTERNAL MEDICINE

## 2022-08-11 NOTE — IMAGING NOTE
Pt arrived to room #4 .  scans taken by Harolyn School  ultrasound technologist       Consent verified and obtained     850 am Imaging Completed by/ Dr Vianney Car    855 am Time out taken      858 am Skin prep with chloro prep sterile towels to site. Site marked right breast 2 o'clock 3 cm from nipple     Lidocaine  1% 10 milligrams per ml given from kit  total amount  5ml given. Lidocaine 1% with epinephrine  1:100,000 units 200 milligrams per  20 ml given total amount  5ml given. 1320 [employer] biopsy device   to be used for core samples     Core # 1 at 9 am  all cores to be placed in sterile cup with 10 ml ns     Total amount cores taken 3  placed in formalin at 902 am          Q Clip placed       94  am Procedure completed. Pressure to site . No active bleeding noted. Area cleaned steri strips to site. Ice pack to site . Post instructions given verbal et written. Avs summary sheet provided to patient. Patient verbalizes understanding and agreement . 904 am Specimen taken to pathology by  [facility] zev Garces To mammography department  for post clip images . Mammography technologist. Mammography department to discharge patient after images completed.

## 2022-08-12 NOTE — PROCEDURES
[facility]  Procedure Note    Isabel Chan Patient Status:  Outpatient    6/15/1945 MRN [id number]   Location [address] Attending Waleska Muir MD   Hosp Day # 0 PCP Jazlyn Johnson MD     Procedure: ultrasound guided biopsy of the right breast    Pre-Procedure Diagnosis:  Right breast 2:00 mass    Post-Procedure Diagnosis: Right breast 2:00 mass    Anesthesia:  Local    Findings:  Right breast 2:00 mass    Specimens: 3    Blood Loss:  minimal    Tourniquet Time: none  Complications:  None  Drains:  None    Radha Goldstein DO  2022

## 2022-08-15 ENCOUNTER — TELEPHONE (OUTPATIENT)
Dept: HEMATOLOGY/ONCOLOGY | Facility: HOSPITAL | Age: 77
End: 2022-08-15

## 2022-08-17 ENCOUNTER — TELEPHONE (OUTPATIENT)
Dept: HEMATOLOGY/ONCOLOGY | Facility: HOSPITAL | Age: 77
End: 2022-08-17

## 2022-08-17 ENCOUNTER — NURSE NAVIGATOR ENCOUNTER (OUTPATIENT)
Dept: HEMATOLOGY/ONCOLOGY | Facility: HOSPITAL | Age: 77
End: 2022-08-17

## 2022-08-17 ENCOUNTER — OFFICE VISIT (OUTPATIENT)
Dept: SURGERY | Facility: CLINIC | Age: 77
End: 2022-08-17
Payer: MEDICARE

## 2022-08-17 VITALS
OXYGEN SATURATION: 93 % | TEMPERATURE: 97 F | WEIGHT: 157.63 LBS | DIASTOLIC BLOOD PRESSURE: 55 MMHG | SYSTOLIC BLOOD PRESSURE: 161 MMHG | RESPIRATION RATE: 18 BRPM | BODY MASS INDEX: 30 KG/M2 | HEART RATE: 77 BPM

## 2022-08-17 DIAGNOSIS — C50.911 INFILTRATING DUCTAL CARCINOMA OF RIGHT BREAST (HCC): Primary | ICD-10-CM

## 2022-08-17 DIAGNOSIS — C50.911 INVASIVE DUCTAL CARCINOMA OF BREAST, FEMALE, RIGHT (HCC): Primary | ICD-10-CM

## 2022-08-17 PROCEDURE — 99205 OFFICE O/P NEW HI 60 MIN: CPT | Performed by: SURGERY

## 2022-08-17 PROCEDURE — 1125F AMNT PAIN NOTED PAIN PRSNT: CPT | Performed by: SURGERY

## 2022-08-17 RX ORDER — MELOXICAM 7.5 MG/1
7.5 TABLET ORAL
COMMUNITY
Start: 2022-08-15

## 2022-08-17 NOTE — TELEPHONE ENCOUNTER
Phoned patient with feedback from [facility] discussion. Shared with patient recommendation from the group, that no further breast imaging is indicated at this time. Patient aware we will proceed with surgical scheduling.

## 2022-08-17 NOTE — PATIENT INSTRUCTIONS
Dr. David Mensah  Tel: [phone]  Fax: [address]  [address] [facility] [address] [place], [place] [address] [phone]     Surgery/Procedure: Right breast wire localized lumpectomy, right lymphoscintigraphy, right sentinel lymph node biopsy       Anesthesia:   Gen  Surgery Length:   1.5 hours CPT:  35853, 97852, 99561   Wire LOC:   Yes Nuc Med:   Yes Pam Seed:  No       Dx & ICD-10: Infiltrating ductal carcinoma of right breast ([facility] [address] (C50.911). Radiology Instructions: Right breast, 2 o'clock, 3 cm from the nipple, Q shaped clip, biopsy confirms invasive ductal carcinoma.   _______________________________________________________________________________    1. Someone must accompany you the day of the procedure to drive you home safely, because of anesthesia. 2. You must remove any kind of makeup, nail polish, lotions, powders, creams or deodorant. 3. [place] ONLY: Pre-admission will give instruct you on when to take Gatorade and Tylenol/acetaminophen prior to your surgery, purchase 2 - 12oz bottles of regular Gatorade (NOT RED/SUGAR FREE). Otherwise, you may not eat or drink anything else after 11PM the night before surgery. 4. [place] ONLY: You may not eat or drink anything after midnight the day of your surgery. 5. Wear comfortable clothing that can be easily removed. 6. If you wear dentures, contacts lenses, or any prosthesis, you will be asked to remove them. 7. Do not drink alcohol or smoke 24 hours prior to your procedure. 8. Bring a picture ID and your insurance card. 9. You will be contacted by the hospital for Pre-Admission Covid-19 testing (regardless of vaccination status) to be scheduled as an appointment prior to surgery. They will call closer to the surgery date to set this up, because the earliest this can be done is 72 hours prior to surgery. 10.  The Pre-Admission Testing Department will call the day before to confirm your procedure, give you the time you need to arrive by and directions on where to go. They begin making calls after 2pm, if you are not contacted by 4pm, please call the surgeon's office listed above. 11. Do not take any blood thinners at least one week prior to the procedure/surgery. This includes aspirin, baby aspirin, Ibuprofen products, herbal supplements, diet medications, vitamin E, fish oil and green tea supplements. Please check other supplements for these ingredients. *TYLENOL or acetaminophen is acceptable*  12. If you take Coumadin, Plavix, Xarelto, or Eliquis, please contact your prescribing physician for special instructions on how long to hold. If you take insulin contact your primary care physician for special instructions. 15. Our surgery scheduler, Enrique Robles, will be contacting you to discuss surgery dates. If you have any questions related to scheduling your surgery, please reach out to her at [phone].  _____________________________________________________________________  PRE-OPERATIVE TESTING IF INDICATED BELOW  PLEASE COMPLETE ASAP (AT LEAST 7-10 DAYS PRIOR TO SURGERY)  [] CBC [x] BMP [] CMP [x] EKG (done 8/4/22)   [] PT, PTT, INR [x] Cardiac Clearance  [] H&P Medical Clearance [] Chest X-ray     Please call Central Scheduling to schedule an appointment for pre-operative labs/tests @ [phone]    Does the patient have a pacemaker or ICD?      [] Yes   [x] No

## 2022-08-23 ENCOUNTER — TELEPHONE (OUTPATIENT)
Dept: HEMATOLOGY/ONCOLOGY | Facility: HOSPITAL | Age: 77
End: 2022-08-23

## 2022-08-24 ENCOUNTER — TELEPHONE (OUTPATIENT)
Dept: SURGERY | Facility: CLINIC | Age: 77
End: 2022-08-24

## 2022-08-24 DIAGNOSIS — C50.911 INFILTRATING DUCTAL CARCINOMA OF RIGHT BREAST (HCC): Primary | ICD-10-CM

## 2022-08-24 NOTE — TELEPHONE ENCOUNTER
Calling pt in regards to scheduling surgery. Informed pt that I have 09/21/2022 available at [facility]. with Dr. Lizzette Pulido. Pt verbalized understanding and in agreement with date and location. All questions answered. Encouraged pt to call or [facility] message office with any other questions or concerns. Home

## 2022-08-25 ENCOUNTER — LAB ENCOUNTER (OUTPATIENT)
Dept: LAB | Age: 77
End: 2022-08-25
Attending: SURGERY
Payer: MEDICARE

## 2022-08-25 DIAGNOSIS — Z01.810 PRE-OPERATIVE CARDIOVASCULAR EXAMINATION: Primary | ICD-10-CM

## 2022-08-25 LAB
ANION GAP SERPL CALC-SCNC: 7 MMOL/L (ref 0–18)
BUN BLD-MCNC: 15 MG/DL (ref 7–18)
BUN/CREAT SERPL: 20.8 (ref 10–20)
CALCIUM BLD-MCNC: 8.7 MG/DL (ref 8.5–10.1)
CHLORIDE SERPL-SCNC: 109 MMOL/L (ref 98–112)
CO2 SERPL-SCNC: 28 MMOL/L (ref 21–32)
CREAT BLD-MCNC: 0.72 MG/DL
FASTING STATUS PATIENT QL REPORTED: YES
GFR SERPLBLD BASED ON 1.73 SQ M-ARVRAT: 86 ML/MIN/1.73M2 (ref 60–?)
GLUCOSE BLD-MCNC: 112 MG/DL (ref 70–99)
OSMOLALITY SERPL CALC.SUM OF ELEC: 300 MOSM/KG (ref 275–295)
POTASSIUM SERPL-SCNC: 3.8 MMOL/L (ref 3.5–5.1)
SODIUM SERPL-SCNC: 144 MMOL/L (ref 136–145)

## 2022-08-25 PROCEDURE — 36415 COLL VENOUS BLD VENIPUNCTURE: CPT

## 2022-08-25 PROCEDURE — 80048 BASIC METABOLIC PNL TOTAL CA: CPT

## 2022-08-30 ENCOUNTER — TELEPHONE (OUTPATIENT)
Dept: SURGERY | Facility: CLINIC | Age: 77
End: 2022-08-30

## 2022-08-30 NOTE — TELEPHONE ENCOUNTER
Called patient back in regards to voicemail she had received. Verified pre-op requirements with patient. Patient has appointment with cardiologist on 9/6/22.

## 2022-09-08 ENCOUNTER — TELEPHONE (OUTPATIENT)
Dept: SURGERY | Facility: CLINIC | Age: 77
End: 2022-09-08

## 2022-09-08 DIAGNOSIS — C50.911 INFILTRATING DUCTAL CARCINOMA OF RIGHT BREAST (HCC): Primary | ICD-10-CM

## 2022-09-08 NOTE — TELEPHONE ENCOUNTER
Called patient and informed her that we are moving her surgery to 09/19/2022 with Dr. Vianey Sommer at [facility]

## 2022-09-12 ENCOUNTER — TELEPHONE (OUTPATIENT)
Dept: HEMATOLOGY/ONCOLOGY | Facility: HOSPITAL | Age: 77
End: 2022-09-12

## 2022-09-12 NOTE — TELEPHONE ENCOUNTER
Phoned patient for care coordination. Discussed pre operative PT assessment. Patient agreeable. Appointment scheduled for 9/15/22 at 12:45. All appointment information and way finding information provided to patient. She acknowledged and denied questions.

## 2022-09-14 ENCOUNTER — OFFICE VISIT (OUTPATIENT)
Dept: INTERNAL MEDICINE CLINIC | Facility: CLINIC | Age: 77
End: 2022-09-14
Payer: MEDICARE

## 2022-09-14 ENCOUNTER — TELEPHONE (OUTPATIENT)
Dept: SURGERY | Facility: CLINIC | Age: 77
End: 2022-09-14

## 2022-09-14 VITALS
DIASTOLIC BLOOD PRESSURE: 75 MMHG | BODY MASS INDEX: 29.27 KG/M2 | WEIGHT: 155 LBS | HEART RATE: 77 BPM | HEIGHT: 61 IN | TEMPERATURE: 99 F | OXYGEN SATURATION: 98 % | SYSTOLIC BLOOD PRESSURE: 138 MMHG

## 2022-09-14 DIAGNOSIS — C50.911 MALIGNANT NEOPLASM OF RIGHT BREAST IN FEMALE, ESTROGEN RECEPTOR POSITIVE, UNSPECIFIED SITE OF BREAST (HCC): Primary | ICD-10-CM

## 2022-09-14 DIAGNOSIS — Z01.818 PREOP EXAMINATION: ICD-10-CM

## 2022-09-14 DIAGNOSIS — Z17.0 MALIGNANT NEOPLASM OF RIGHT BREAST IN FEMALE, ESTROGEN RECEPTOR POSITIVE, UNSPECIFIED SITE OF BREAST (HCC): Primary | ICD-10-CM

## 2022-09-14 PROCEDURE — 1126F AMNT PAIN NOTED NONE PRSNT: CPT | Performed by: INTERNAL MEDICINE

## 2022-09-14 PROCEDURE — 99213 OFFICE O/P EST LOW 20 MIN: CPT | Performed by: INTERNAL MEDICINE

## 2022-09-14 NOTE — TELEPHONE ENCOUNTER
Contacted Dr Myra Dunbar office regarding cardiac clearance. Dr Meghann Almeida office will be faxing cardiac clearance, office fax number provided.

## 2022-09-15 ENCOUNTER — LAB ENCOUNTER (OUTPATIENT)
Dept: LAB | Facility: HOSPITAL | Age: 77
End: 2022-09-15
Attending: SURGERY
Payer: MEDICARE

## 2022-09-15 ENCOUNTER — OFFICE VISIT (OUTPATIENT)
Dept: PHYSICAL THERAPY | Facility: HOSPITAL | Age: 77
End: 2022-09-15
Attending: INTERNAL MEDICINE
Payer: MEDICARE

## 2022-09-15 DIAGNOSIS — Z01.818 PREOP TESTING: ICD-10-CM

## 2022-09-15 RX ORDER — MELOXICAM 7.5 MG/1
TABLET ORAL
COMMUNITY
Start: 2022-09-14

## 2022-09-15 NOTE — PAT NURSING NOTE
Nataliia Mckinley, patient of Dr. Claude Alatorre scheduled for 9/19/22, Right Breast Lumpectomy has a HX of Pseudocholinesterase deficiency noted in her chart. When asked about it, patient denies having this history and states she was never told by a doctor that she had this condition. I've sent an e-mail to our team and will leave a note on the Condition Alert tab in her chart.

## 2022-09-16 ENCOUNTER — LAB ENCOUNTER (OUTPATIENT)
Dept: LAB | Age: 77
End: 2022-09-16
Attending: SURGERY

## 2022-09-16 LAB — SARS-COV-2 RNA RESP QL NAA+PROBE: NOT DETECTED

## 2022-09-19 ENCOUNTER — APPOINTMENT (OUTPATIENT)
Dept: MAMMOGRAPHY | Facility: HOSPITAL | Age: 77
End: 2022-09-19
Attending: SURGERY

## 2022-09-19 ENCOUNTER — ANESTHESIA (OUTPATIENT)
Dept: SURGERY | Facility: HOSPITAL | Age: 77
End: 2022-09-19
Payer: MEDICARE

## 2022-09-19 ENCOUNTER — HOSPITAL ENCOUNTER (OUTPATIENT)
Facility: HOSPITAL | Age: 77
Setting detail: HOSPITAL OUTPATIENT SURGERY
Discharge: HOME | End: 2022-09-19
Attending: SURGERY | Admitting: SURGERY

## 2022-09-19 ENCOUNTER — HOSPITAL ENCOUNTER (OUTPATIENT)
Dept: NUCLEAR MEDICINE | Facility: HOSPITAL | Age: 77
Discharge: HOME | End: 2022-09-19
Attending: SURGERY

## 2022-09-19 ENCOUNTER — HOSPITAL ENCOUNTER (OUTPATIENT)
Dept: MAMMOGRAPHY | Facility: HOSPITAL | Age: 77
Discharge: HOME | End: 2022-09-19
Attending: SURGERY

## 2022-09-19 ENCOUNTER — ANESTHESIA EVENT (OUTPATIENT)
Dept: SURGERY | Facility: HOSPITAL | Age: 77
End: 2022-09-19
Payer: MEDICARE

## 2022-09-19 VITALS
WEIGHT: 152 LBS | OXYGEN SATURATION: 98 % | RESPIRATION RATE: 14 BRPM | HEART RATE: 73 BPM | DIASTOLIC BLOOD PRESSURE: 58 MMHG | SYSTOLIC BLOOD PRESSURE: 140 MMHG | BODY MASS INDEX: 30.64 KG/M2 | HEIGHT: 59 IN | TEMPERATURE: 98 F

## 2022-09-19 DIAGNOSIS — C50.911 INFILTRATING DUCTAL CARCINOMA OF RIGHT BREAST (HCC): ICD-10-CM

## 2022-09-19 DIAGNOSIS — Z01.818 PREOP TESTING: Primary | ICD-10-CM

## 2022-09-19 PROCEDURE — 0HBT0ZZ EXCISION OF RIGHT BREAST, OPEN APPROACH: ICD-10-PCS | Performed by: SURGERY

## 2022-09-19 PROCEDURE — 19281 PERQ DEVICE BREAST 1ST IMAG: CPT | Performed by: SURGERY

## 2022-09-19 PROCEDURE — 78195 LYMPH SYSTEM IMAGING: CPT | Performed by: SURGERY

## 2022-09-19 PROCEDURE — 88341 IMHCHEM/IMCYTCHM EA ADD ANTB: CPT | Performed by: SURGERY

## 2022-09-19 PROCEDURE — 88307 TISSUE EXAM BY PATHOLOGIST: CPT | Performed by: SURGERY

## 2022-09-19 PROCEDURE — 76098 X-RAY EXAM SURGICAL SPECIMEN: CPT | Performed by: SURGERY

## 2022-09-19 PROCEDURE — 88342 IMHCHEM/IMCYTCHM 1ST ANTB: CPT | Performed by: SURGERY

## 2022-09-19 PROCEDURE — 88360 TUMOR IMMUNOHISTOCHEM/MANUAL: CPT | Performed by: SURGERY

## 2022-09-19 PROCEDURE — 07B50ZX EXCISION OF RIGHT AXILLARY LYMPHATIC, OPEN APPROACH, DIAGNOSTIC: ICD-10-PCS | Performed by: SURGERY

## 2022-09-19 RX ORDER — ONDANSETRON 2 MG/ML
INJECTION INTRAMUSCULAR; INTRAVENOUS AS NEEDED
Status: DISCONTINUED | OUTPATIENT
Start: 2022-09-19 | End: 2022-09-19 | Stop reason: SURG

## 2022-09-19 RX ORDER — HYDROCODONE BITARTRATE AND ACETAMINOPHEN 5; 325 MG/1; MG/1
1 TABLET ORAL ONCE AS NEEDED
Status: COMPLETED | OUTPATIENT
Start: 2022-09-19 | End: 2022-09-19

## 2022-09-19 RX ORDER — SODIUM CHLORIDE, SODIUM LACTATE, POTASSIUM CHLORIDE, CALCIUM CHLORIDE 600; 310; 30; 20 MG/100ML; MG/100ML; MG/100ML; MG/100ML
INJECTION, SOLUTION INTRAVENOUS CONTINUOUS
Status: DISCONTINUED | OUTPATIENT
Start: 2022-09-19 | End: 2022-09-19

## 2022-09-19 RX ORDER — MORPHINE SULFATE 4 MG/ML
2 INJECTION, SOLUTION INTRAMUSCULAR; INTRAVENOUS EVERY 10 MIN PRN
Status: DISCONTINUED | OUTPATIENT
Start: 2022-09-19 | End: 2022-09-19

## 2022-09-19 RX ORDER — HYDROCODONE BITARTRATE AND ACETAMINOPHEN 5; 325 MG/1; MG/1
1-2 TABLET ORAL EVERY 6 HOURS PRN
Qty: 20 TABLET | Refills: 0 | Status: SHIPPED | OUTPATIENT
Start: 2022-09-19

## 2022-09-19 RX ORDER — HYDROMORPHONE HYDROCHLORIDE 1 MG/ML
0.6 INJECTION, SOLUTION INTRAMUSCULAR; INTRAVENOUS; SUBCUTANEOUS EVERY 5 MIN PRN
Status: DISCONTINUED | OUTPATIENT
Start: 2022-09-19 | End: 2022-09-19

## 2022-09-19 RX ORDER — HYDROMORPHONE HYDROCHLORIDE 1 MG/ML
0.2 INJECTION, SOLUTION INTRAMUSCULAR; INTRAVENOUS; SUBCUTANEOUS EVERY 5 MIN PRN
Status: DISCONTINUED | OUTPATIENT
Start: 2022-09-19 | End: 2022-09-19

## 2022-09-19 RX ORDER — METOCLOPRAMIDE 10 MG/1
10 TABLET ORAL ONCE
Status: COMPLETED | OUTPATIENT
Start: 2022-09-19 | End: 2022-09-19

## 2022-09-19 RX ORDER — LIDOCAINE HYDROCHLORIDE 10 MG/ML
INJECTION, SOLUTION EPIDURAL; INFILTRATION; INTRACAUDAL; PERINEURAL AS NEEDED
Status: DISCONTINUED | OUTPATIENT
Start: 2022-09-19 | End: 2022-09-19 | Stop reason: SURG

## 2022-09-19 RX ORDER — FAMOTIDINE 20 MG/1
20 TABLET, FILM COATED ORAL ONCE
Status: COMPLETED | OUTPATIENT
Start: 2022-09-19 | End: 2022-09-19

## 2022-09-19 RX ORDER — ACETAMINOPHEN 500 MG
1000 TABLET ORAL ONCE
Status: COMPLETED | OUTPATIENT
Start: 2022-09-19 | End: 2022-09-19

## 2022-09-19 RX ORDER — BUPIVACAINE HYDROCHLORIDE 5 MG/ML
INJECTION, SOLUTION EPIDURAL; INTRACAUDAL AS NEEDED
Status: DISCONTINUED | OUTPATIENT
Start: 2022-09-19 | End: 2022-09-19 | Stop reason: HOSPADM

## 2022-09-19 RX ORDER — HYDROMORPHONE HYDROCHLORIDE 1 MG/ML
0.4 INJECTION, SOLUTION INTRAMUSCULAR; INTRAVENOUS; SUBCUTANEOUS EVERY 5 MIN PRN
Status: DISCONTINUED | OUTPATIENT
Start: 2022-09-19 | End: 2022-09-19

## 2022-09-19 RX ORDER — MORPHINE SULFATE 4 MG/ML
4 INJECTION, SOLUTION INTRAMUSCULAR; INTRAVENOUS EVERY 10 MIN PRN
Status: DISCONTINUED | OUTPATIENT
Start: 2022-09-19 | End: 2022-09-19

## 2022-09-19 RX ORDER — LIDOCAINE HYDROCHLORIDE AND EPINEPHRINE 10; 10 MG/ML; UG/ML
INJECTION, SOLUTION INFILTRATION; PERINEURAL AS NEEDED
Status: DISCONTINUED | OUTPATIENT
Start: 2022-09-19 | End: 2022-09-19 | Stop reason: HOSPADM

## 2022-09-19 RX ORDER — CEFAZOLIN SODIUM/WATER 2 G/20 ML
2 SYRINGE (ML) INTRAVENOUS ONCE
Status: DISCONTINUED | OUTPATIENT
Start: 2022-09-19 | End: 2022-09-19 | Stop reason: HOSPADM

## 2022-09-19 RX ORDER — NALOXONE HYDROCHLORIDE 0.4 MG/ML
80 INJECTION, SOLUTION INTRAMUSCULAR; INTRAVENOUS; SUBCUTANEOUS AS NEEDED
Status: DISCONTINUED | OUTPATIENT
Start: 2022-09-19 | End: 2022-09-19

## 2022-09-19 RX ORDER — SODIUM CHLORIDE 9 MG/ML
INJECTION, SOLUTION INTRAVENOUS CONTINUOUS PRN
Status: DISCONTINUED | OUTPATIENT
Start: 2022-09-19 | End: 2022-09-19 | Stop reason: SURG

## 2022-09-19 RX ORDER — DEXAMETHASONE SODIUM PHOSPHATE 4 MG/ML
VIAL (ML) INJECTION AS NEEDED
Status: DISCONTINUED | OUTPATIENT
Start: 2022-09-19 | End: 2022-09-19 | Stop reason: SURG

## 2022-09-19 RX ORDER — MORPHINE SULFATE 10 MG/ML
6 INJECTION, SOLUTION INTRAMUSCULAR; INTRAVENOUS EVERY 10 MIN PRN
Status: DISCONTINUED | OUTPATIENT
Start: 2022-09-19 | End: 2022-09-19

## 2022-09-19 RX ADMIN — LIDOCAINE HYDROCHLORIDE 50 MG: 10 INJECTION, SOLUTION EPIDURAL; INFILTRATION; INTRACAUDAL; PERINEURAL at 15:28:00

## 2022-09-19 RX ADMIN — SODIUM CHLORIDE: 9 INJECTION, SOLUTION INTRAVENOUS at 15:28:00

## 2022-09-19 RX ADMIN — DEXAMETHASONE SODIUM PHOSPHATE 4 MG: 4 MG/ML VIAL (ML) INJECTION at 16:09:00

## 2022-09-19 RX ADMIN — ONDANSETRON 4 MG: 2 INJECTION INTRAMUSCULAR; INTRAVENOUS at 16:09:00

## 2022-09-19 NOTE — ANESTHESIA PROCEDURE NOTES
Airway  Date/Time: 9/19/2022 3:29 PM  Urgency: Elective    Airway not difficult    General Information and Staff    Patient location during procedure: OR  Anesthesiologist: Maxwell East MD  Performed: anesthesiologist     Indications and Patient Condition  Indications for airway management: anesthesia  Sedation level: deep  Preoxygenated: yes  Patient position: sniffing  Mask difficulty assessment: 1 - vent by mask    Final Airway Details  Final airway type: supraglottic airway      Successful airway: classic  Size 4      Number of attempts at approach: 1    Additional Comments  Atx good seal

## 2022-09-19 NOTE — PROCEDURES
[facility]  Procedure Note    Theron Pearce Patient Status:  Outpatient    6/15/1945 MRN [id number]   Location [facility] Attending Mehnaz Galloway MD    Day # 0 PCP Guanakito Rosen MD     Procedure: mammographic guided wire localization    Pre-Procedure Diagnosis:  Right breast UIQ Q    Post-Procedure Diagnosis: Right breast UIQ Q    Anesthesia:  Local    Findings:  Right breast UIQ Q    Specimens: loc    Blood Loss:  minimal    Tourniquet Time: none  Complications:  None  Drains:  None    Radha Goldstein DO  2022

## 2022-09-19 NOTE — BRIEF OP NOTE
Pre-Operative Diagnosis: Infiltrating ductal carcinoma of right breast (HCC) [C50.911]     Post-Operative Diagnosis: Infiltrating ductal carcinoma of right breast ([address] [C50.911]      Procedure Performed:   Right breast wire localized lumpectomy, right lymphoscintigraphy, right sentinel lymph node biopsy    Surgeon(s) and Role:     Emilia Covarrubias MD - Primary    Assistant(s):  Surgical Assistant.: Dyan Lorenzo CSA     Surgical Findings: Clip in x-ray, sentinel lymph node x1     Specimen: Right lumpectomy, right margins x6, sentinel lymph node x1     Estimated Blood Loss: 5cc    Arnulfo Melvin MD  9/19/2022  3:08 PM

## 2022-09-20 NOTE — OPERATIVE REPORT
[facility]    PATIENT'S NAME: Agatha Balderas   ATTENDING PHYSICIAN: Luis Hall MD   OPERATING PHYSICIAN: Luis Polanco. Cara Hall MD   PATIENT ACCOUNT#:   [id number]    LOCATION:  [address]  MEDICAL RECORD #:   [id number]       YOB: 1945  ADMISSION DATE:       09/19/2022      OPERATION DATE:  09/19/2022    OPERATIVE REPORT    PREOPERATIVE DIAGNOSIS:  Invasive ductal carcinoma of the right breast.  POSTOPERATIVE DIAGNOSIS:  Invasive ductal carcinoma of the right breast.  PROCEDURE:  Right breast wire localized lumpectomy with right breast specimen radiography, right breast injection of blue dye for sentinel lymph node identification, right sentinel lymph node biopsy, right breast advancement flap mastopexy for a defect measuring 12 sq cm. ASSISTANT:  Tim Shahid CSA. ESTIMATED BLOOD LOSS:  5 mL. DRAINS:  None. COMPLICATIONS:  None. DISPOSITION:  Stable on transfer to the recovery room. INDICATIONS:  The patient is a 67-year-old female presenting with an imaging-detected right breast cancer. She was found on imaging to have a suspicious 1.4 cm mass and biopsy confirmed disease. We discussed local treatment options. She was motivated for a breast-conserving approach. We discussed the need to achieve free margins, the possibility of re-excision to achieve free margins as well as possible need for postoperative systemic and/or radiation therapy. Risks and possible complications were discussed with the patient including, but not limited to, infection, bleeding, injury to surrounding structures, possible need for reoperation. She agreed to the proposed surgery. OPERATIVE TECHNIQUE:  Patient was brought to the imaging suite.   She underwent a wire localization of the area of concern in the right breast. She also underwent injection of radioisotope as well as lymphoscintigraphy for sentinel lymph node indication preoperatively in the nuclear medicine department. She was then brought to the OR, placed in the supine position, properly padded and secured, given a dose of IV antibiotics, and sequential compression devices were applied to her legs for DVT prophylaxis. General anesthesia was induced, and the right breast was prepped and draped in usual sterile fashion. Then, 1% lidocaine with epinephrine was used to infiltrate the skin and subcutaneous tissues at the targeted incision site. Diluted methylene blue dye was injected in the periareolar location and massaged. A transverse incision was made with the 15-blade knife at the inferior aspect of the right axillary hairline after confirming uptake with the handheld gamma counter. Sharp dissection and electrocautery were used to dissect the various layers of the axilla including dissection through the clavipectoral fascia into the deep axilla where sentinel node was identified and harvested. A total of 1 node was removed as sentinel node, sent for permanent pathologic evaluation. Her wound was irrigated and hemostasis assured with electrocautery and hemoclips. It was closed with an interrupted 3-0 Vicryl for deep layer, running 4-0 subcuticular Monocryl for the skin. Mastisol and Steri-Strips were applied, and 0.5% Marcaine was instilled in the cavity to assist with postoperative analgesia. Attention was taken toward the breast.  Then, 1% lidocaine with epinephrine was used to infiltrate the skin and subcutaneous tissues at the targeted incision site. A curvilinear incision was made along the medial areolar border with a 15 blade knife through the skin. Wire was identified and brought into the field.   Using sharp dissection and electrocautery, a segment of breast tissue surrounding the tip of the wire was carefully excised; oriented with a short stitch, single clip superiorly and long stitch, double clip laterally;  placed in the imaging device where specimen x-ray confirmed the presence of the targeted clip, residual area with adequate margins as deemed by myself. Using sharp dissection and electrocautery, 6 margins were then taken from the interior of the lumpectomy cavity, each marked with a clip at true margin. They were individually labeled, and sent for permanent pathologic evaluation. Cavity was irrigated with warm sterile saline. Hemostasis was assured with electrocautery, and hemoclips were placed around the periphery of the cavity to assist in subsequent radiation planning. She was found have a large 12 sq cm defect in the upper inner right breast, and this was thought to impair both optimal wound healing and cosmesis, for which we proceeded with an advancement flap mastopexy. This required a counterincision to be placed in immediate adjacent superior lateral breast parenchyma down to the level of the pectoralis fascia and via lateral vascular pedicle. Tissue was mobilized into the aforementioned defect and secured with the pectoralis fascia with a running 3-0 PDS suture. Her wound was then closed with interrupted 3-0 Vicryl for deep layer, running 4-0 subcuticular Monocryl for skin. Mastisol and Steri-Strips were applied, and 0.5% Marcaine was instilled in the cavity to assist with postoperative analgesia. Sterile dressing and compression bra were placed. Blood loss was minimal.  All counts were correct at the conclusion of the procedure. She tolerated the procedure well. She was transferred to the recovery area in stable condition. Dictated By Brandon Núñez.  Armando Stokes MD  d: 09/19/2022 16:16:38  t: 09/19/2022 21:37:29  [facility] [id number]  BENOIT/    cc: MD Lila Doshi APRN

## 2022-09-23 ENCOUNTER — TELEPHONE (OUTPATIENT)
Dept: HEMATOLOGY/ONCOLOGY | Facility: HOSPITAL | Age: 77
End: 2022-09-23

## 2022-09-23 NOTE — TELEPHONE ENCOUNTER
Phoned patient s/p right breast lumpectomy performed 9/19/22 by Dr. Indira Cornelius. Patient reports doing well. She has scheduled follow up with Dr. Indira Cornelius. Discussed scheduling Medical Oncology consultation for discussion of adjuvant treatment recommendations. Appointment scheduled with Dr. Ashly Oneill for 10/6/22. Patient acknowledged and was appreciative of call.

## 2022-09-26 ENCOUNTER — OFFICE VISIT (OUTPATIENT)
Dept: SURGERY | Facility: CLINIC | Age: 77
End: 2022-09-26

## 2022-09-26 DIAGNOSIS — C50.911 INFILTRATING DUCTAL CARCINOMA OF RIGHT BREAST (HCC): Primary | ICD-10-CM

## 2022-10-05 ENCOUNTER — OFFICE VISIT (OUTPATIENT)
Dept: SURGERY | Facility: CLINIC | Age: 77
End: 2022-10-05
Payer: MEDICARE

## 2022-10-05 VITALS
RESPIRATION RATE: 18 BRPM | BODY MASS INDEX: 31 KG/M2 | SYSTOLIC BLOOD PRESSURE: 153 MMHG | HEART RATE: 77 BPM | TEMPERATURE: 98 F | DIASTOLIC BLOOD PRESSURE: 57 MMHG | WEIGHT: 155 LBS | OXYGEN SATURATION: 99 %

## 2022-10-05 DIAGNOSIS — Z17.0 MALIGNANT NEOPLASM OF UPPER-OUTER QUADRANT OF RIGHT BREAST IN FEMALE, ESTROGEN RECEPTOR POSITIVE (HCC): Primary | ICD-10-CM

## 2022-10-05 DIAGNOSIS — I10 ESSENTIAL HYPERTENSION: ICD-10-CM

## 2022-10-05 DIAGNOSIS — J45.20 MILD INTERMITTENT ASTHMA WITH ALLERGIC RHINITIS WITHOUT COMPLICATION: ICD-10-CM

## 2022-10-05 DIAGNOSIS — C50.411 MALIGNANT NEOPLASM OF UPPER-OUTER QUADRANT OF RIGHT BREAST IN FEMALE, ESTROGEN RECEPTOR POSITIVE (HCC): Primary | ICD-10-CM

## 2022-10-05 DIAGNOSIS — E78.5 HYPERLIPIDEMIA, UNSPECIFIED HYPERLIPIDEMIA TYPE: ICD-10-CM

## 2022-10-05 PROCEDURE — 1125F AMNT PAIN NOTED PAIN PRSNT: CPT | Performed by: SURGERY

## 2022-10-05 PROCEDURE — 99024 POSTOP FOLLOW-UP VISIT: CPT | Performed by: SURGERY

## 2022-10-06 ENCOUNTER — OFFICE VISIT (OUTPATIENT)
Dept: HEMATOLOGY/ONCOLOGY | Facility: HOSPITAL | Age: 77
End: 2022-10-06
Attending: INTERNAL MEDICINE
Payer: MEDICARE

## 2022-10-06 VITALS
BODY MASS INDEX: 31.25 KG/M2 | RESPIRATION RATE: 16 BRPM | SYSTOLIC BLOOD PRESSURE: 152 MMHG | DIASTOLIC BLOOD PRESSURE: 61 MMHG | HEIGHT: 59 IN | OXYGEN SATURATION: 96 % | WEIGHT: 155 LBS | HEART RATE: 75 BPM | TEMPERATURE: 98 F

## 2022-10-06 DIAGNOSIS — Z17.0 BREAST CANCER, STAGE 1, ESTROGEN RECEPTOR POSITIVE, RIGHT (HCC): ICD-10-CM

## 2022-10-06 DIAGNOSIS — C50.311 MALIGNANT NEOPLASM OF LOWER-INNER QUADRANT OF RIGHT BREAST OF FEMALE, ESTROGEN RECEPTOR POSITIVE (HCC): Primary | ICD-10-CM

## 2022-10-06 DIAGNOSIS — C50.911 BREAST CANCER, STAGE 1, ESTROGEN RECEPTOR POSITIVE, RIGHT (HCC): ICD-10-CM

## 2022-10-06 DIAGNOSIS — Z17.0 MALIGNANT NEOPLASM OF LOWER-INNER QUADRANT OF RIGHT BREAST OF FEMALE, ESTROGEN RECEPTOR POSITIVE (HCC): Primary | ICD-10-CM

## 2022-10-06 PROBLEM — M81.8 DRUG-INDUCED OSTEOPOROSIS: Status: ACTIVE | Noted: 2022-10-06

## 2022-10-06 PROBLEM — T50.905A DRUG-INDUCED OSTEOPOROSIS: Status: ACTIVE | Noted: 2022-10-06

## 2022-10-06 PROCEDURE — 99204 OFFICE O/P NEW MOD 45 MIN: CPT | Performed by: INTERNAL MEDICINE

## 2022-10-06 RX ORDER — ANASTROZOLE 1 MG/1
1 TABLET ORAL DAILY
Qty: 30 TABLET | Refills: 0 | Status: SHIPPED | OUTPATIENT
Start: 2022-10-06

## 2022-10-06 RX ORDER — AMLODIPINE BESYLATE 5 MG/1
5 TABLET ORAL NIGHTLY
Qty: 90 TABLET | Refills: 1 | Status: SHIPPED | OUTPATIENT
Start: 2022-10-06

## 2022-10-06 RX ORDER — LOSARTAN POTASSIUM 100 MG/1
100 TABLET ORAL DAILY
Qty: 90 TABLET | Refills: 1 | Status: SHIPPED | OUTPATIENT
Start: 2022-10-06

## 2022-10-06 RX ORDER — ROSUVASTATIN CALCIUM 10 MG/1
10 TABLET, COATED ORAL NIGHTLY
Qty: 90 TABLET | Refills: 1 | Status: SHIPPED | OUTPATIENT
Start: 2022-10-06

## 2022-10-06 RX ORDER — FLUTICASONE FUROATE AND VILANTEROL 200; 25 UG/1; UG/1
1 POWDER RESPIRATORY (INHALATION) NIGHTLY
Qty: 3 EACH | Refills: 1 | Status: SHIPPED | OUTPATIENT
Start: 2022-10-06

## 2022-10-07 ENCOUNTER — TELEPHONE (OUTPATIENT)
Dept: HEMATOLOGY/ONCOLOGY | Facility: HOSPITAL | Age: 77
End: 2022-10-07

## 2022-10-07 NOTE — TELEPHONE ENCOUNTER
Called patient no answer left message on voicemail regarding appointments that was scheduled per Dr Grayson Agudelo

## 2022-10-17 ENCOUNTER — OFFICE VISIT (OUTPATIENT)
Dept: PHYSICAL THERAPY | Facility: HOSPITAL | Age: 77
End: 2022-10-17
Attending: INTERNAL MEDICINE
Payer: MEDICARE

## 2022-10-20 ENCOUNTER — SOCIAL WORK SERVICES (OUTPATIENT)
Dept: HEMATOLOGY/ONCOLOGY | Facility: HOSPITAL | Age: 77
End: 2022-10-20

## 2022-10-20 ENCOUNTER — OFFICE VISIT (OUTPATIENT)
Dept: RADIATION ONCOLOGY | Facility: HOSPITAL | Age: 77
End: 2022-10-20
Attending: RADIOLOGY
Payer: MEDICARE

## 2022-10-20 VITALS
HEART RATE: 87 BPM | TEMPERATURE: 98 F | RESPIRATION RATE: 18 BRPM | OXYGEN SATURATION: 97 % | DIASTOLIC BLOOD PRESSURE: 51 MMHG | SYSTOLIC BLOOD PRESSURE: 159 MMHG

## 2022-10-20 PROCEDURE — 99212 OFFICE O/P EST SF 10 MIN: CPT

## 2022-10-20 NOTE — PROGRESS NOTES
SW notified of distress scree above 4. Per nursing patient has declined SW services at this time, but will keep it in mind for future.

## 2022-10-31 RX ORDER — ANASTROZOLE 1 MG/1
TABLET ORAL
Qty: 30 TABLET | Refills: 1 | Status: SHIPPED | OUTPATIENT
Start: 2022-10-31

## 2022-11-07 ENCOUNTER — OFFICE VISIT (OUTPATIENT)
Dept: HEMATOLOGY/ONCOLOGY | Facility: HOSPITAL | Age: 77
End: 2022-11-07
Attending: INTERNAL MEDICINE
Payer: MEDICARE

## 2022-11-07 VITALS
SYSTOLIC BLOOD PRESSURE: 158 MMHG | TEMPERATURE: 97 F | HEART RATE: 76 BPM | WEIGHT: 150 LBS | DIASTOLIC BLOOD PRESSURE: 60 MMHG | HEIGHT: 59 IN | BODY MASS INDEX: 30.24 KG/M2 | OXYGEN SATURATION: 95 % | RESPIRATION RATE: 16 BRPM

## 2022-11-07 DIAGNOSIS — C50.911 BREAST CANCER, STAGE 1, ESTROGEN RECEPTOR POSITIVE, RIGHT (HCC): Primary | ICD-10-CM

## 2022-11-07 DIAGNOSIS — M81.8 DRUG-INDUCED OSTEOPOROSIS: Primary | ICD-10-CM

## 2022-11-07 DIAGNOSIS — Z17.0 BREAST CANCER, STAGE 1, ESTROGEN RECEPTOR POSITIVE, RIGHT (HCC): Primary | ICD-10-CM

## 2022-11-07 DIAGNOSIS — T50.905A DRUG-INDUCED OSTEOPOROSIS: Primary | ICD-10-CM

## 2022-11-07 DIAGNOSIS — T50.905A DRUG-INDUCED OSTEOPOROSIS: ICD-10-CM

## 2022-11-07 DIAGNOSIS — M81.8 DRUG-INDUCED OSTEOPOROSIS: ICD-10-CM

## 2022-11-07 LAB
CALCIUM BLD-MCNC: 9.6 MG/DL (ref 8.5–10.1)
CREAT BLD-MCNC: 0.84 MG/DL
GFR SERPLBLD BASED ON 1.73 SQ M-ARVRAT: 71 ML/MIN/1.73M2 (ref 60–?)

## 2022-11-07 PROCEDURE — 82565 ASSAY OF CREATININE: CPT

## 2022-11-07 PROCEDURE — 96374 THER/PROPH/DIAG INJ IV PUSH: CPT

## 2022-11-07 PROCEDURE — 99214 OFFICE O/P EST MOD 30 MIN: CPT | Performed by: INTERNAL MEDICINE

## 2022-11-07 PROCEDURE — 82310 ASSAY OF CALCIUM: CPT

## 2022-11-07 RX ORDER — ANASTROZOLE 1 MG/1
1 TABLET ORAL DAILY
Qty: 90 TABLET | Refills: 1 | Status: SHIPPED | OUTPATIENT
Start: 2022-11-07

## 2022-11-07 NOTE — PROGRESS NOTES
Pt tolerated first zometa dose. Reviewed medication and possible SE. Pt will return q 3 months for MD f/u and zometa q 6 months as ordered. Pt verbalized understanding.      Education Record    Learner:  Patient, spouse    Disease / Diagnosis: breast ca; drug induced osteoporosis     Barriers / Limitations:  None   Comments:    Method:  Discussion; printed material   Comments:    General Topics:  Plan of care reviewed   Comments:    Outcome:  Shows understanding   Comments:

## 2022-12-02 ENCOUNTER — NURSE TRIAGE (OUTPATIENT)
Dept: INTERNAL MEDICINE CLINIC | Facility: CLINIC | Age: 77
End: 2022-12-02

## 2022-12-02 LAB — AMB EXT COVID-19 RESULT: DETECTED

## 2022-12-02 NOTE — TELEPHONE ENCOUNTER
Spoke to patient's spouse Madai Rans (on LYNN), verified Name and . Relayed Dr. Anu Dutta message below. Spouse verbalized understanding and had no further questions at this time.

## 2022-12-02 NOTE — TELEPHONE ENCOUNTER
I send christi , she needs to hold her cholesterol medication , there is interaction, agree with your advise , if any sob go to er

## 2022-12-15 ENCOUNTER — TELEPHONE (OUTPATIENT)
Dept: PHYSICAL THERAPY | Facility: HOSPITAL | Age: 77
End: 2022-12-15

## 2022-12-15 ENCOUNTER — TELEPHONE (OUTPATIENT)
Dept: HEMATOLOGY/ONCOLOGY | Facility: HOSPITAL | Age: 77
End: 2022-12-15

## 2022-12-19 ENCOUNTER — TELEPHONE (OUTPATIENT)
Dept: PHYSICAL THERAPY | Facility: HOSPITAL | Age: 77
End: 2022-12-19

## 2022-12-19 ENCOUNTER — TELEPHONE (OUTPATIENT)
Dept: HEMATOLOGY/ONCOLOGY | Facility: HOSPITAL | Age: 77
End: 2022-12-19

## 2022-12-19 ENCOUNTER — APPOINTMENT (OUTPATIENT)
Dept: PHYSICAL THERAPY | Facility: HOSPITAL | Age: 77
End: 2022-12-19
Attending: SURGERY
Payer: MEDICARE

## 2022-12-19 NOTE — TELEPHONE ENCOUNTER
Evie Thorpe called back. She reports she is taking her anastrozole before bed. She is having problems sleeping. In the morning she wakes up and feels light headed. She has also been feeling nauseated and has had dry heaves. No vomiting. She has also noticed her appetite has decreased. She is drinking at least 64 oz of water daily. No other cold or flu symptoms. No sick family members or known exposure to any Covid 19+ contacts. I recommended she try taking her anastrozole in the morning after she has eaten and drank 16oz or more of caffeine free fluids. She agreed to try it to see if it makes a difference. I told her I would update Dr Antonette West and TAMMI Armenta now.

## 2022-12-19 NOTE — TELEPHONE ENCOUNTER
Dr Mensah Model patient under surveillance for breast cancer taking anastrozole. I attempted to reach Ren at both her home and cell numbers. No answer. I left a voice mail message asking her to please call the office.

## 2022-12-19 NOTE — TELEPHONE ENCOUNTER
Palmer Pearce had covid and was off chemo meds and went back on meds 12/8.  Since that she wakes up dizzy nausea no vomiting. christian

## 2023-01-09 ENCOUNTER — OFFICE VISIT (OUTPATIENT)
Dept: INTERNAL MEDICINE CLINIC | Facility: CLINIC | Age: 78
End: 2023-01-09
Payer: MEDICARE

## 2023-01-09 ENCOUNTER — OFFICE VISIT (OUTPATIENT)
Dept: PHYSICAL THERAPY | Facility: HOSPITAL | Age: 78
End: 2023-01-09
Attending: SURGERY
Payer: MEDICARE

## 2023-01-09 VITALS
BODY MASS INDEX: 28.83 KG/M2 | HEIGHT: 59 IN | SYSTOLIC BLOOD PRESSURE: 138 MMHG | TEMPERATURE: 97 F | HEART RATE: 73 BPM | OXYGEN SATURATION: 98 % | WEIGHT: 143 LBS | DIASTOLIC BLOOD PRESSURE: 75 MMHG

## 2023-01-09 DIAGNOSIS — C50.919 BREAST CANCER (HCC): Primary | ICD-10-CM

## 2023-01-09 DIAGNOSIS — Z00.00 ENCOUNTER FOR ANNUAL HEALTH EXAMINATION: ICD-10-CM

## 2023-01-09 DIAGNOSIS — Z00.00 ANNUAL PHYSICAL EXAM: Primary | ICD-10-CM

## 2023-01-09 DIAGNOSIS — I89.0 LYMPHEDEMA: ICD-10-CM

## 2023-01-09 PROCEDURE — 1125F AMNT PAIN NOTED PAIN PRSNT: CPT | Performed by: INTERNAL MEDICINE

## 2023-01-09 PROCEDURE — G0439 PPPS, SUBSEQ VISIT: HCPCS | Performed by: INTERNAL MEDICINE

## 2023-01-10 RX ORDER — PANTOPRAZOLE SODIUM 40 MG/1
40 TABLET, DELAYED RELEASE ORAL
Qty: 90 TABLET | Refills: 1 | Status: SHIPPED | OUTPATIENT
Start: 2023-01-10

## 2023-01-10 NOTE — TELEPHONE ENCOUNTER
Refill passed per [employer] [employer] protocol. Requested Prescriptions   Pending Prescriptions Disp Refills    PANTOPRAZOLE 40 MG Oral Tab EC [Pharmacy Med Name: PANTOPRAZOLE TAB 40MG DR] 90 tablet 1     Sig: TAKE 1 TABLET BEFORE       BREAKFAST       Gastrointestional Medication Protocol Passed - 1/10/2023  1:18 AM        Passed - In person appointment or virtual visit in the past 12 mos or appointment in next 3 mos     Recent Outpatient Visits              Yesterday Breast cancer [facility])    [address] Evan Pena PT    Office Visit    Yesterday Annual physical exam    Chika Carrington MD    Office Visit    2 months ago Drug-induced osteoporosis    [address] Visit    2 months ago Drug-induced osteoporosis    [address]    Nurse Only    2 months ago Breast cancer, stage 1, estrogen receptor positive, right [facility])    [facility] Hematology Oncology Trina Landaverde MD    Office Visit          Future Appointments         Provider Department Appt Notes    In 3 days Evan Foremans, [address]     In 6 days Evan Pena, [address]     In 4 weeks Killian Streeter [facility] 19 Hematology Oncology FOLLOW UP VISIT.     In 2 months [facility] [address] [facility]     In 2 months Matilda Juarez MD [facility] Surgical Oncology Group F/U in 6 months    In 4 months  [address]    In 4 months EM [facility] IN[facility]N [address]    In 6 months Shira Brooks MD [employer] [employer], [address] [facility], [facility] 6 month fu                      Future Appointments         Provider Department Appt Notes    In 3 days Blaine Pena, [address]     In 6 days Evan Pena, [address]     In 4 weeks Killian Anderson 19 Hematology Oncology FOLLOW UP VISIT.     In 2 months [facility] [address]     In 2 months MD Gisel Márquez Surgical Oncology Group F/U in 6 months    In 4 months  [facility]    In 4 months EM [facility] [address] [facility]    In 6 months Liz Angulo MD [facility], [facility], [place], [address] 6 month fu              Recent Outpatient Visits              Yesterday Breast cancer [facility])    [address], [place]    Office Visit    Yesterday Annual physical exam    Tha Coburn MD    Office Visit    2 months ago Drug-induced osteoporosis    [address] Visit    2 months ago Drug-induced osteoporosis    [address] - Infusion    Nurse Only    2 months ago Breast cancer, stage 1, estrogen receptor positive, right [facility])    [facility] CL[facility]ICS Hematology Oncology Chano Tsang MD    Office Visit

## 2023-01-11 ENCOUNTER — OFFICE VISIT (OUTPATIENT)
Dept: PHYSICAL THERAPY | Facility: HOSPITAL | Age: 78
End: 2023-01-11
Attending: SURGERY
Payer: MEDICARE

## 2023-01-11 PROCEDURE — 97161 PT EVAL LOW COMPLEX 20 MIN: CPT | Performed by: PHYSICAL THERAPIST

## 2023-01-11 PROCEDURE — 97140 MANUAL THERAPY 1/> REGIONS: CPT | Performed by: PHYSICAL THERAPIST

## 2023-01-13 ENCOUNTER — OFFICE VISIT (OUTPATIENT)
Dept: PHYSICAL THERAPY | Facility: HOSPITAL | Age: 78
End: 2023-01-13
Attending: SURGERY
Payer: MEDICARE

## 2023-01-13 DIAGNOSIS — I89.0 LYMPHEDEMA: ICD-10-CM

## 2023-01-13 DIAGNOSIS — C50.919 BREAST CANCER (HCC): ICD-10-CM

## 2023-01-13 PROCEDURE — 97140 MANUAL THERAPY 1/> REGIONS: CPT | Performed by: PHYSICAL THERAPIST

## 2023-01-16 ENCOUNTER — APPOINTMENT (OUTPATIENT)
Dept: PHYSICAL THERAPY | Facility: HOSPITAL | Age: 78
End: 2023-01-16
Attending: SURGERY
Payer: MEDICARE

## 2023-01-23 ENCOUNTER — OFFICE VISIT (OUTPATIENT)
Dept: PHYSICAL THERAPY | Facility: HOSPITAL | Age: 78
End: 2023-01-23
Attending: SURGERY
Payer: MEDICARE

## 2023-01-23 PROCEDURE — 97140 MANUAL THERAPY 1/> REGIONS: CPT | Performed by: PHYSICAL THERAPIST

## 2023-01-24 ENCOUNTER — APPOINTMENT (OUTPATIENT)
Dept: PHYSICAL THERAPY | Facility: HOSPITAL | Age: 78
End: 2023-01-24
Attending: INTERNAL MEDICINE
Payer: MEDICARE

## 2023-02-07 ENCOUNTER — OFFICE VISIT (OUTPATIENT)
Dept: PHYSICAL THERAPY | Facility: HOSPITAL | Age: 78
End: 2023-02-07
Attending: SURGERY
Payer: MEDICARE

## 2023-02-07 ENCOUNTER — OFFICE VISIT (OUTPATIENT)
Dept: HEMATOLOGY/ONCOLOGY | Facility: HOSPITAL | Age: 78
End: 2023-02-07
Attending: INTERNAL MEDICINE
Payer: MEDICARE

## 2023-02-07 VITALS
RESPIRATION RATE: 16 BRPM | OXYGEN SATURATION: 95 % | DIASTOLIC BLOOD PRESSURE: 70 MMHG | WEIGHT: 140 LBS | HEART RATE: 81 BPM | SYSTOLIC BLOOD PRESSURE: 154 MMHG | BODY MASS INDEX: 28.22 KG/M2 | TEMPERATURE: 98 F | HEIGHT: 59 IN

## 2023-02-07 DIAGNOSIS — Z17.0 BREAST CANCER, STAGE 1, ESTROGEN RECEPTOR POSITIVE, RIGHT (HCC): Primary | ICD-10-CM

## 2023-02-07 DIAGNOSIS — Z79.811 USE OF ANASTROZOLE: ICD-10-CM

## 2023-02-07 DIAGNOSIS — C50.911 BREAST CANCER, STAGE 1, ESTROGEN RECEPTOR POSITIVE, RIGHT (HCC): Primary | ICD-10-CM

## 2023-02-07 PROCEDURE — 97140 MANUAL THERAPY 1/> REGIONS: CPT | Performed by: PHYSICAL THERAPIST

## 2023-02-07 PROCEDURE — 99214 OFFICE O/P EST MOD 30 MIN: CPT | Performed by: INTERNAL MEDICINE

## 2023-02-07 RX ORDER — ANASTROZOLE 1 MG/1
1 TABLET ORAL DAILY
Qty: 90 TABLET | Refills: 3 | Status: SHIPPED | OUTPATIENT
Start: 2023-02-07 | End: 2023-05-08

## 2023-02-24 ENCOUNTER — LAB ENCOUNTER (OUTPATIENT)
Dept: LAB | Facility: HOSPITAL | Age: 78
End: 2023-02-24
Attending: INTERNAL MEDICINE
Payer: MEDICARE

## 2023-02-24 ENCOUNTER — OFFICE VISIT (OUTPATIENT)
Dept: PHYSICAL THERAPY | Facility: HOSPITAL | Age: 78
End: 2023-02-24
Attending: SURGERY
Payer: MEDICARE

## 2023-02-24 DIAGNOSIS — Z00.00 ANNUAL PHYSICAL EXAM: ICD-10-CM

## 2023-02-24 LAB
ALBUMIN SERPL-MCNC: 4 G/DL (ref 3.4–5)
ALBUMIN/GLOB SERPL: 1 {RATIO} (ref 1–2)
ALP LIVER SERPL-CCNC: 76 U/L
ALT SERPL-CCNC: 27 U/L
ANION GAP SERPL CALC-SCNC: 8 MMOL/L (ref 0–18)
AST SERPL-CCNC: 17 U/L (ref 15–37)
BASOPHILS # BLD AUTO: 0.05 X10(3) UL (ref 0–0.2)
BASOPHILS NFR BLD AUTO: 0.7 %
BILIRUB SERPL-MCNC: 0.7 MG/DL (ref 0.1–2)
BUN BLD-MCNC: 17 MG/DL (ref 7–18)
BUN/CREAT SERPL: 21 (ref 10–20)
CALCIUM BLD-MCNC: 8.9 MG/DL (ref 8.5–10.1)
CHLORIDE SERPL-SCNC: 108 MMOL/L (ref 98–112)
CHOLEST SERPL-MCNC: 146 MG/DL (ref ?–200)
CO2 SERPL-SCNC: 27 MMOL/L (ref 21–32)
CREAT BLD-MCNC: 0.81 MG/DL
DEPRECATED RDW RBC AUTO: 46.2 FL (ref 35.1–46.3)
EOSINOPHIL # BLD AUTO: 0.15 X10(3) UL (ref 0–0.7)
EOSINOPHIL NFR BLD AUTO: 2.1 %
ERYTHROCYTE [DISTWIDTH] IN BLOOD BY AUTOMATED COUNT: 14 % (ref 11–15)
FASTING PATIENT LIPID ANSWER: YES
FASTING STATUS PATIENT QL REPORTED: YES
GFR SERPLBLD BASED ON 1.73 SQ M-ARVRAT: 74 ML/MIN/1.73M2 (ref 60–?)
GLOBULIN PLAS-MCNC: 4.2 G/DL (ref 2.8–4.4)
GLUCOSE BLD-MCNC: 114 MG/DL (ref 70–99)
HCT VFR BLD AUTO: 39.8 %
HDLC SERPL-MCNC: 67 MG/DL (ref 40–59)
HGB BLD-MCNC: 12.7 G/DL
IMM GRANULOCYTES # BLD AUTO: 0.02 X10(3) UL (ref 0–1)
IMM GRANULOCYTES NFR BLD: 0.3 %
LDLC SERPL CALC-MCNC: 62 MG/DL (ref ?–100)
LYMPHOCYTES # BLD AUTO: 1.89 X10(3) UL (ref 1–4)
LYMPHOCYTES NFR BLD AUTO: 27 %
MCH RBC QN AUTO: 28.6 PG (ref 26–34)
MCHC RBC AUTO-ENTMCNC: 31.9 G/DL (ref 31–37)
MCV RBC AUTO: 89.6 FL
MONOCYTES # BLD AUTO: 0.7 X10(3) UL (ref 0.1–1)
MONOCYTES NFR BLD AUTO: 10 %
NEUTROPHILS # BLD AUTO: 4.18 X10 (3) UL (ref 1.5–7.7)
NEUTROPHILS # BLD AUTO: 4.18 X10(3) UL (ref 1.5–7.7)
NEUTROPHILS NFR BLD AUTO: 59.9 %
NONHDLC SERPL-MCNC: 79 MG/DL (ref ?–130)
OSMOLALITY SERPL CALC.SUM OF ELEC: 298 MOSM/KG (ref 275–295)
PLATELET # BLD AUTO: 290 10(3)UL (ref 150–450)
POTASSIUM SERPL-SCNC: 3.6 MMOL/L (ref 3.5–5.1)
PROT SERPL-MCNC: 8.2 G/DL (ref 6.4–8.2)
RBC # BLD AUTO: 4.44 X10(6)UL
SODIUM SERPL-SCNC: 143 MMOL/L (ref 136–145)
TRIGL SERPL-MCNC: 93 MG/DL (ref 30–149)
TSI SER-ACNC: 1.28 MIU/ML (ref 0.36–3.74)
VLDLC SERPL CALC-MCNC: 14 MG/DL (ref 0–30)
WBC # BLD AUTO: 7 X10(3) UL (ref 4–11)

## 2023-02-24 PROCEDURE — 80053 COMPREHEN METABOLIC PANEL: CPT

## 2023-02-24 PROCEDURE — 85025 COMPLETE CBC W/AUTO DIFF WBC: CPT

## 2023-02-24 PROCEDURE — 80061 LIPID PANEL: CPT

## 2023-02-24 PROCEDURE — 36415 COLL VENOUS BLD VENIPUNCTURE: CPT

## 2023-02-24 PROCEDURE — 97140 MANUAL THERAPY 1/> REGIONS: CPT | Performed by: PHYSICAL THERAPIST

## 2023-02-24 PROCEDURE — 84443 ASSAY THYROID STIM HORMONE: CPT

## 2023-03-03 ENCOUNTER — OFFICE VISIT (OUTPATIENT)
Dept: PHYSICAL THERAPY | Facility: HOSPITAL | Age: 78
End: 2023-03-03
Attending: SURGERY
Payer: MEDICARE

## 2023-03-03 PROCEDURE — 97140 MANUAL THERAPY 1/> REGIONS: CPT | Performed by: PHYSICAL THERAPIST

## 2023-03-20 DIAGNOSIS — I10 ESSENTIAL HYPERTENSION: ICD-10-CM

## 2023-03-20 DIAGNOSIS — E78.5 HYPERLIPIDEMIA, UNSPECIFIED HYPERLIPIDEMIA TYPE: ICD-10-CM

## 2023-03-21 RX ORDER — AMLODIPINE BESYLATE 5 MG/1
5 TABLET ORAL NIGHTLY
Qty: 90 TABLET | Refills: 1 | Status: SHIPPED | OUTPATIENT
Start: 2023-03-21

## 2023-03-21 RX ORDER — ROSUVASTATIN CALCIUM 10 MG/1
10 TABLET, COATED ORAL NIGHTLY
Qty: 90 TABLET | Refills: 1 | Status: SHIPPED | OUTPATIENT
Start: 2023-03-21

## 2023-03-21 NOTE — TELEPHONE ENCOUNTER
Refill passed per [facility] protocol.   Requested Prescriptions   Pending Prescriptions Disp Refills    AMLODIPINE 5 MG Oral Tab [Pharmacy Med Name: AMLODIPINE TAB 5MG] 90 tablet 1     Sig: TAKE 1 TABLET NIGHTLY       Hypertensive Medications Protocol Failed - 3/20/2023  1:16 AM        Failed - Last BP reading less than 140/90     BP Readings from Last 1 Encounters:  02/07/23 : 154/70              Passed - In person appointment in the past 12 or next 3 months     Recent Outpatient Visits              2 weeks ago     [facility], [address]    Office Visit    3 weeks ago     [address] [place], [address]    Office Visit    1 month ago     [address], Arcenio Canter, [address]    Office Visit    1 month ago Breast cancer, stage 1, estrogen receptor positive, right [facility])    [facility] AND CLINICS Hematology Oncology Lashawn Buckley MD    Office Visit    1 month ago     [facility], [address]    Office Visit          Future Appointments         Provider Department Appt Notes    In 1 week [facility] SYSTEM OF THE [facility] [address]     In 2 weeks Ashish Matias MD [address] [facility] [place],[address], [address] [facility] F/U in 6 months    In 4 weeks [facility] Medicare/Cigna Suppl  re-assessment    In 2 months  [address]    In 2 months EM CC INFRN [address] [facility]    In 2 months Killian R[facility] 19 Hematology Oncology FOLLOW UP VISIT.cl  **please see in Infusion\"    In 3 months MD Helio [facility], [address] [place] [address],[address], [place] 6 month fu                Passed - CMP or BMP in past 6 months     Recent Results (from the past 4392 hour(s))   COMP METABOLIC PANEL (14)    Collection Time: 02/24/23  9:28 AM   Result Value Ref Range    Glucose 114 (H) 70 - 99 mg/dL    Sodium 143 136 - 145 mmol/L    Potassium 3.6 3.5 - 5.1 mmol/L    Chloride 108 98 - 112 mmol/L    CO2 27.0 21.0 - 32.0 mmol/L    Anion Gap 8 0 - 18 mmol/L    BUN 17 7 - 18 mg/dL    Creatinine 0.81 0.55 - 1.02 mg/dL    BUN/CREA Ratio 21.0 (H) 10.0 - 20.0    Calcium, Total 8.9 8.5 - 10.1 mg/dL    Calculated Osmolality 298 (H) 275 - 295 mOsm/kg    eGFR-Cr 74 >=60 mL/min/1.73m2    ALT 27 13 - 56 U/L    AST 17 15 - 37 U/L    Alkaline Phosphatase 76 55 - 142 U/L    Bilirubin, Total 0.7 0.1 - 2.0 mg/dL    Total Protein 8.2 6.4 - 8.2 g/dL    Albumin 4.0 3.4 - 5.0 g/dL    Globulin  4.2 2.8 - 4.4 g/dL    A/G Ratio 1.0 1.0 - 2.0    Patient Fasting for CMP? Yes      *Note: Due to a large number of results and/or encounters for the requested time period, some results have not been displayed. A complete set of results can be found in Results Review.                Passed - In person appointment or virtual visit in the past 6 months     Recent Outpatient Visits              2 weeks ago     [address] [place], [place]    Office Visit    3 weeks ago     [address] [place], [place]    Office Visit    1 month ago     [address] [place], [place]    Office Visit    1 month ago Breast cancer, stage 1, estrogen receptor positive, right [facility] AND CLINICS Hematology Oncology My Mendieta MD    Office Visit    1 month ago     [facility] Cruz Prieto [place]    Office Visit          Future Appointments         Provider Department Appt Notes    In 1 week [facility] OF THE [facility] [address] for Health     In 2 weeks MD Richie Cancino, [address] [facility] F/U in 6 months    In 4 weeks Cruz Prieto [facility] Medicare/Cigna Suppl  re-assessment    In 2 months TERRANCE Fri[address] - Infusion [place] CREATININE-SERUM-CALCIUM-PIV-HJ    In 2 months EM CC INFRN 1 [address]    In 2 months Killian Maravilla 19 Hematology Oncology FOLLOW UP VISIT.cl  **please see in Infusion\"    In 3 months [facility] [address], [place] 6 month fu                Passed - [facility] or GFRNAA > 50     GFR Evaluation  EGFRCR: 74 , resulted on 2/24/2023            ROSUVASTATIN 10 MG Oral Tab [Pharmacy Med Name: ROSUVASTATIN TAB 10MG] 90 tablet 1     Sig: TAKE 1 TABLET NIGHTLY       Cholesterol Medication Protocol Passed - 3/20/2023  1:16 AM        Passed - ALT in past 12 months        Passed - LDL in past 12 months        Passed - Last ALT < 80     Lab Results   Component Value Date    ALT 27 02/24/2023             Passed - Last LDL < 130     Lab Results   Component Value Date    LDL 62 02/24/2023             Passed - In person appointment or virtual visit in the past 12 mos or appointment in next 3 mos     Recent Outpatient Visits              2 weeks ago     [address] [place], [address]    Office Visit    3 weeks ago     [address] [place], [address]    Office Visit    1 month ago     [address] [place], [address]    Office Visit    1 month ago Breast cancer, stage 1, estrogen receptor positive, right [facility])    [facility] AND CLINICS Hematology Oncology Mikal Churchill MD    Office Visit    1 month ago     [facility], [address]    Office Visit          Future Appointments         Provider Department Appt Notes    In 1 week [facility] SYSTEM OF THE [facility] [address]     In 2 weeks Kailey Lima MD [address] [facility] 84 F/U in 6 months    In 4 weeks [facility] Medicare/Cigna Suppl  re-assessment    In 2 months EM CC LAB1 Birtha Mariangel. [address] [facility]    In 2 months EM CC INFRN [address]    In 2 months Killian [place]incon 19 Hematology Oncology FOLLOW UP VISIT.cl  **please see in Infusion\"    In 3 months Arlin Dumas MD [address] [facility], [place] 6 month fu                   Recent Outpatient Visits              2 weeks ago     [address] [place], [place]    Office Visit    3 weeks ago     [address] [place], [place]    Office Visit    1 month ago     [address] [place], [place]    Office Visit    1 month ago Breast cancer, stage 1, estrogen receptor positive, right [facility])    [facility] AND [facility] Hematology Oncology Dmitri Brown MD    Office Visit    1 month ago     [facility] [place]    Office Visit          Future Appointments         Provider Department Appt Notes    In 1 week [facility] OF THE [facility] [address] [facility]     In 2 weeks Amanda Diane MD [address] [facility] [place],[address], [place] [address] F/U in 6 months    In 4 weeks [facility] Medicare/Cigna Suppl  re-assessment    In 2 months  [facility]    In 2 months EM CC INFRN [address]    In 2 months Killian [place] 19 Hematology Oncology FOLLOW UP VISIT.cl  **please see in Infusion\"    In 3 months Arlin Dumas MD [address], [place] 6 month fu

## 2023-03-21 NOTE — TELEPHONE ENCOUNTER
Protocol failed or has No Protocol, please review  Requested Prescriptions   Pending Prescriptions Disp Refills    AMLODIPINE 5 MG Oral Tab [Pharmacy Med Name: AMLODIPINE TAB 5MG] 90 tablet 1     Sig: TAKE 1 TABLET NIGHTLY       Hypertensive Medications Protocol Failed - 3/20/2023  1:16 AM        Failed - Last BP reading less than 140/90     BP Readings from Last 1 Encounters:  02/07/23 : 154/70              Passed - In person appointment in the past 12 or next 3 months     Recent Outpatient Visits              2 weeks ago     [facility] Garima Antonio, [address]    Office Visit    3 weeks ago     [address] [place], [address]    Office Visit    1 month ago     [address], Emir Huang, [address]    Office Visit    1 month ago Breast cancer, stage 1, estrogen receptor positive, right [facility])    [facility] AND CLINICS Hematology Oncology Chelsea Cox MD    Office Visit    1 month ago     [facility] Garima Antonio, [address]    Office Visit          Future Appointments         Provider Department Appt Notes    In 1 week [facility] SYSTEM OF THE [place] [address]     In 2 weeks Sheryle Hick, MD [address] [facility],[address], [facility] F/U in 6 months    In 4 weeks [facility] Medicare/Cigna Suppl  re-assessment    In 2 months  [facility]    In 2 months EM CC INFRN [address] [facility]    In 2 months Killian Boyer [facility] 19 Hematology Oncology FOLLOW UP VISIT.cl  **please see in Infusion\"    In 3 months MD [facility], [address] [place] [address],[address], [place] 6 month fu                Passed - CMP or BMP in past 6 months     Recent Results (from the past 4392 hour(s))   COMP METABOLIC PANEL (14)    Collection Time: 02/24/23  9:28 AM Result Value Ref Range    Glucose 114 (H) 70 - 99 mg/dL    Sodium 143 136 - 145 mmol/L    Potassium 3.6 3.5 - 5.1 mmol/L    Chloride 108 98 - 112 mmol/L    CO2 27.0 21.0 - 32.0 mmol/L    Anion Gap 8 0 - 18 mmol/L    BUN 17 7 - 18 mg/dL    Creatinine 0.81 0.55 - 1.02 mg/dL    BUN/CREA Ratio 21.0 (H) 10.0 - 20.0    Calcium, Total 8.9 8.5 - 10.1 mg/dL    Calculated Osmolality 298 (H) 275 - 295 mOsm/kg    eGFR-Cr 74 >=60 mL/min/1.73m2    ALT 27 13 - 56 U/L    AST 17 15 - 37 U/L    Alkaline Phosphatase 76 55 - 142 U/L    Bilirubin, Total 0.7 0.1 - 2.0 mg/dL    Total Protein 8.2 6.4 - 8.2 g/dL    Albumin 4.0 3.4 - 5.0 g/dL    Globulin  4.2 2.8 - 4.4 g/dL    A/G Ratio 1.0 1.0 - 2.0    Patient Fasting for CMP? Yes      *Note: Due to a large number of results and/or encounters for the requested time period, some results have not been displayed. A complete set of results can be found in Results Review.                Passed - In person appointment or virtual visit in the past 6 months     Recent Outpatient Visits              2 weeks ago     [address] [place], [place]    Office Visit    3 weeks ago     [address] [place], [place]    Office Visit    1 month ago     [address] [place], [place]    Office Visit    1 month ago Breast cancer, stage 1, estrogen receptor positive, right [facility] AND CLINICS Hematology Oncology Lilia Morataya MD    Office Visit    1 month ago     [place], [place]    Office Visit          Future Appointments         Provider Department Appt Notes    In 1 week [facility] SYSTEM OF THE [place] [address] for Health     In 2 weeks MD Julee Riggs [place] F/U in 6 months    In 4 weeks [facility] Medicare/Cigna Suppl  re-assessment    In 2 months EM CC LAB1 [facility] - Infusion [place] CREATININE-SERUM-CALCIUM-PIV-HJ    In 2 months EM CC INFRN [address]    In 2 months Killian Arango 19 Hematology Oncology FOLLOW UP VISIT.cl  **please see in Infusion\"    In 3 months [facility], [address], [place] 6 month fu                Passed - [facility] or GFRNAA > 50     GFR Evaluation  EGFRCR: 74 , resulted on 2/24/2023           Signed Prescriptions Disp Refills    rosuvastatin 10 MG Oral Tab 90 tablet 1     Sig: Take 1 tablet (10 mg total) by mouth nightly.        Cholesterol Medication Protocol Passed - 3/20/2023  1:16 AM        Passed - ALT in past 12 months        Passed - LDL in past 12 months        Passed - Last ALT < 80     Lab Results   Component Value Date    ALT 27 02/24/2023             Passed - Last LDL < 130     Lab Results   Component Value Date    LDL 62 02/24/2023             Passed - In person appointment or virtual visit in the past 12 mos or appointment in next 3 mos     Recent Outpatient Visits              2 weeks ago     [address] [place], [address]    Office Visit    3 weeks ago     [address] [place], [address]    Office Visit    1 month ago     [address] [place], [address]    Office Visit    1 month ago Breast cancer, stage 1, estrogen receptor positive, right [facility])    [facility] AND CLINICS Hematology Oncology Kostas Chauhan MD    Office Visit    1 month ago     [address] [place], [address]    Office Visit          Future Appointments         Provider Department Appt Notes    In 1 week [facility] SYSTEM OF THE [facility] [address] for [facility]     In 2 weeks MD Chasidy Tom [place] [address] F/U in 6 months    In 4 weeks Vaibhav Phelps [facility] Medicare/Cigna Suppl  re-assessment    In 2 months  [facility]    In 2 months EM CC INFRN [address]    In 2 months Killian Parry 19 Hematology Oncology FOLLOW UP VISIT.cl  **please see in Infusion\"    In 3 months Deborah Batres MD [address], [place] 6 month fu                   Future Appointments         Provider Department Appt Notes    In 1 week [facility] SYSTEM OF THE [facility] [address]     In 2 weeks Lawrence Dumont MD [address], [place] [address] F/U in 6 months    In 4 weeks Learta Kanaris, [address] Medicare/Cigna Suppl  re-assessment    In 2 months  [facility]    In 2 months EM CC INFRN [address]    In 2 months Killian [place] 19 Hematology Oncology FOLLOW UP VISIT.cl  **please see in Infusion\"    In 3 months Deborah Batres MD [address], [place] 6 month fu           Recent Outpatient Visits              2 weeks ago     [address] [place], [place]    Office Visit    3 weeks ago     [address] [place], [place]    Office Visit    1 month ago     [address] [place], [place]    Office Visit    1 month ago Breast cancer, stage 1, estrogen receptor positive, right [facility] AND CLINICS Hematology Oncology Krysta Grace MD    Office Visit    1 month ago     [address] [place], [place]    Office Visit

## 2023-04-03 ENCOUNTER — OFFICE VISIT (OUTPATIENT)
Dept: SURGERY | Facility: CLINIC | Age: 78
End: 2023-04-03
Payer: MEDICARE

## 2023-04-03 ENCOUNTER — HOSPITAL ENCOUNTER (OUTPATIENT)
Dept: MAMMOGRAPHY | Facility: HOSPITAL | Age: 78
Discharge: HOME | End: 2023-04-03
Attending: SURGERY
Payer: MEDICARE

## 2023-04-03 ENCOUNTER — HOSPITAL ENCOUNTER (OUTPATIENT)
Dept: ULTRASOUND IMAGING | Facility: HOSPITAL | Age: 78
Discharge: HOME | End: 2023-04-03
Attending: SURGERY
Payer: MEDICARE

## 2023-04-03 VITALS
DIASTOLIC BLOOD PRESSURE: 76 MMHG | SYSTOLIC BLOOD PRESSURE: 135 MMHG | HEART RATE: 83 BPM | RESPIRATION RATE: 16 BRPM | WEIGHT: 136.63 LBS | TEMPERATURE: 98 F | OXYGEN SATURATION: 95 % | BODY MASS INDEX: 28 KG/M2

## 2023-04-03 DIAGNOSIS — Z17.0 MALIGNANT NEOPLASM OF UPPER-OUTER QUADRANT OF RIGHT BREAST IN FEMALE, ESTROGEN RECEPTOR POSITIVE (HCC): ICD-10-CM

## 2023-04-03 DIAGNOSIS — C50.411 MALIGNANT NEOPLASM OF UPPER-OUTER QUADRANT OF RIGHT BREAST IN FEMALE, ESTROGEN RECEPTOR POSITIVE (HCC): ICD-10-CM

## 2023-04-03 DIAGNOSIS — J45.20 MILD INTERMITTENT ASTHMA WITH ALLERGIC RHINITIS WITHOUT COMPLICATION: ICD-10-CM

## 2023-04-03 DIAGNOSIS — Z17.0 MALIGNANT NEOPLASM OF UPPER-OUTER QUADRANT OF RIGHT BREAST IN FEMALE, ESTROGEN RECEPTOR POSITIVE (HCC): Primary | ICD-10-CM

## 2023-04-03 DIAGNOSIS — I10 ESSENTIAL HYPERTENSION: ICD-10-CM

## 2023-04-03 DIAGNOSIS — C50.411 MALIGNANT NEOPLASM OF UPPER-OUTER QUADRANT OF RIGHT BREAST IN FEMALE, ESTROGEN RECEPTOR POSITIVE (HCC): Primary | ICD-10-CM

## 2023-04-03 PROCEDURE — 76642 ULTRASOUND BREAST LIMITED: CPT | Performed by: SURGERY

## 2023-04-03 PROCEDURE — 77061 BREAST TOMOSYNTHESIS UNI: CPT | Performed by: SURGERY

## 2023-04-03 PROCEDURE — 77065 DX MAMMO INCL CAD UNI: CPT | Performed by: SURGERY

## 2023-04-04 RX ORDER — FLUTICASONE FUROATE AND VILANTEROL 200; 25 UG/1; UG/1
1 POWDER RESPIRATORY (INHALATION) DAILY
Qty: 3 EACH | Refills: 0 | Status: SHIPPED | OUTPATIENT
Start: 2023-04-04

## 2023-04-04 RX ORDER — LOSARTAN POTASSIUM 100 MG/1
TABLET ORAL
Qty: 90 TABLET | Refills: 1 | Status: SHIPPED | OUTPATIENT
Start: 2023-04-04

## 2023-04-19 ENCOUNTER — OFFICE VISIT (OUTPATIENT)
Dept: PHYSICAL THERAPY | Facility: HOSPITAL | Age: 78
End: 2023-04-19
Attending: SURGERY
Payer: MEDICARE

## 2023-04-19 PROCEDURE — 97140 MANUAL THERAPY 1/> REGIONS: CPT | Performed by: PHYSICAL THERAPIST

## 2023-04-24 ENCOUNTER — OFFICE VISIT (OUTPATIENT)
Dept: PHYSICAL THERAPY | Facility: HOSPITAL | Age: 78
End: 2023-04-24
Attending: SURGERY
Payer: MEDICARE

## 2023-04-24 PROCEDURE — 97140 MANUAL THERAPY 1/> REGIONS: CPT | Performed by: PHYSICAL THERAPIST

## 2023-05-17 ENCOUNTER — OFFICE VISIT (OUTPATIENT)
Dept: PHYSICAL THERAPY | Facility: HOSPITAL | Age: 78
End: 2023-05-17
Attending: SURGERY
Payer: MEDICARE

## 2023-05-17 PROCEDURE — 97140 MANUAL THERAPY 1/> REGIONS: CPT | Performed by: PHYSICAL THERAPIST

## 2023-05-22 ENCOUNTER — OFFICE VISIT (OUTPATIENT)
Dept: HEMATOLOGY/ONCOLOGY | Facility: HOSPITAL | Age: 78
End: 2023-05-22
Attending: INTERNAL MEDICINE
Payer: MEDICARE

## 2023-05-22 VITALS
DIASTOLIC BLOOD PRESSURE: 53 MMHG | HEART RATE: 72 BPM | SYSTOLIC BLOOD PRESSURE: 138 MMHG | TEMPERATURE: 98 F | RESPIRATION RATE: 16 BRPM | OXYGEN SATURATION: 99 %

## 2023-05-22 DIAGNOSIS — M81.8 DRUG-INDUCED OSTEOPOROSIS: Primary | ICD-10-CM

## 2023-05-22 DIAGNOSIS — T50.905A DRUG-INDUCED OSTEOPOROSIS: Primary | ICD-10-CM

## 2023-05-22 DIAGNOSIS — C50.911 BREAST CANCER, STAGE 1, ESTROGEN RECEPTOR POSITIVE, RIGHT (HCC): Primary | ICD-10-CM

## 2023-05-22 DIAGNOSIS — Z79.811 USE OF ANASTROZOLE: ICD-10-CM

## 2023-05-22 DIAGNOSIS — Z17.0 BREAST CANCER, STAGE 1, ESTROGEN RECEPTOR POSITIVE, RIGHT (HCC): Primary | ICD-10-CM

## 2023-05-22 LAB
CALCIUM BLD-MCNC: 9.4 MG/DL (ref 8.5–10.1)
CREAT BLD-MCNC: 0.72 MG/DL
GFR SERPLBLD BASED ON 1.73 SQ M-ARVRAT: 86 ML/MIN/1.73M2 (ref 60–?)

## 2023-05-22 PROCEDURE — 96374 THER/PROPH/DIAG INJ IV PUSH: CPT

## 2023-05-22 PROCEDURE — 82565 ASSAY OF CREATININE: CPT

## 2023-05-22 PROCEDURE — 82310 ASSAY OF CALCIUM: CPT

## 2023-05-22 PROCEDURE — 99214 OFFICE O/P EST MOD 30 MIN: CPT | Performed by: INTERNAL MEDICINE

## 2023-05-22 NOTE — PROGRESS NOTES
Pt here for [id number] Zometa. Pt denies any issues or concerns. No dental complaints. Ordering MD: Jose       Pt tolerated infusion without difficulty or complaint.  Reviewed next apt date/time: 6 months with labs and MD      Education Record    Learner:  Patient    Disease / Diagnosis:    Barriers / Limitations:  None       Method:  Discussion      General Topics:  Medication and Side effects and symptom management      Outcome:  Shows understanding

## 2023-05-23 ENCOUNTER — TELEPHONE (OUTPATIENT)
Facility: CLINIC | Age: 78
End: 2023-05-23

## 2023-05-23 DIAGNOSIS — I10 ESSENTIAL HYPERTENSION: ICD-10-CM

## 2023-05-23 RX ORDER — AMLODIPINE BESYLATE 5 MG/1
5 TABLET ORAL NIGHTLY
Qty: 90 TABLET | Refills: 1 | Status: CANCELLED | OUTPATIENT
Start: 2023-05-23

## 2023-05-24 ENCOUNTER — TELEPHONE (OUTPATIENT)
Dept: INTERNAL MEDICINE CLINIC | Facility: CLINIC | Age: 78
End: 2023-05-24

## 2023-05-24 RX ORDER — AMLODIPINE BESYLATE 5 MG/1
10 TABLET ORAL DAILY
Qty: 180 TABLET | Refills: 1 | Status: CANCELLED | OUTPATIENT
Start: 2023-05-24

## 2023-05-24 NOTE — TELEPHONE ENCOUNTER
Patient requesting refill for amlodipine be sent to [facility] in [facility] and not [facility].

## 2023-06-26 DIAGNOSIS — J45.20 MILD INTERMITTENT ASTHMA WITH ALLERGIC RHINITIS WITHOUT COMPLICATION: ICD-10-CM

## 2023-06-26 RX ORDER — FLUTICASONE FUROATE AND VILANTEROL 200; 25 UG/1; UG/1
1 POWDER RESPIRATORY (INHALATION) DAILY
Qty: 60 EACH | Refills: 3 | Status: SHIPPED | OUTPATIENT
Start: 2023-06-26

## 2023-06-27 NOTE — TELEPHONE ENCOUNTER
Refill passed per [employer] protocol. Requested Prescriptions   Pending Prescriptions Disp Refills    BREO ELLIPTA 200-25 MCG/ACT Inhalation Aerosol Powder, Breath Activated [Pharmacy Med Name: Anibal Mayes 200-25]  0     Sig: USE 1 INHALATION ORALLY    DAILY       Asthma & COPD Medication Protocol Passed - 6/26/2023  7:11 AM        Passed - In person appointment or virtual visit in the past 6 mos or appointment in next 3 mos     Recent Outpatient Visits              1 month ago Breast cancer, stage 1, estrogen receptor positive, right [facility])    [facility] AND CLINICS Hematology Oncology Krysta Grace MD    Office Visit    1 month ago Drug-induced osteoporosis    [address]    Nurse Only    1 month ago Drug-induced osteoporosis    [address] Visit    1 month ago     [facility] Jessica Gutierrez PT    Office Visit    2 months ago     [address] [facility], [address] [facility]    Office Visit          Future Appointments         Provider Department Appt Notes    In 4 weeks Deborah Batres MD [address], [address], [facility] 4/27 [employer] sent advising to r/s appt due to Dr hilario changed. TY  6 month fu    In 3 months [facility] [address]     In 4 months [address]    In 4 months [facility] SYSTEM Hematology Oncology FOLLOW UP VISIT.cl  **please see in Infusion\"    In 4 months EM CC INFRN [address]                     Future Appointments         Provider Department Appt Notes    In 4 weeks Deborah Batres MD [address], [address], [facility] 4/27 MyChart sent advising to r/s appt due to Dr hilario changed.  TY  6 month fu    In 3 months [facility] Killian [place] 19 Mammography - [facility]     In 4 months EM [address] [place] [address] Rd    In 4 months J Carlos Tesfaye [facility] SYSTEM Hematology Oncology FOLLOW UP VISIT.cl  **please see in Infusion\"    In 4 months EM CC INFRN [address] Outpatient Visits              1 month ago Breast cancer, stage 1, estrogen receptor positive, right [facility])    [facility] AND [facility] Hematology Oncology Mariposa Martin MD    Office Visit    1 month ago Drug-induced osteoporosis    [address] [facility] [address]    Nurse Only    1 month ago Drug-induced osteoporosis    [address] Visit    1 month ago     [address] [place], [place]    Office Visit    2 months ago     [address] [place], [place]    Office Visit

## 2023-07-24 ENCOUNTER — OFFICE VISIT (OUTPATIENT)
Dept: INTERNAL MEDICINE CLINIC | Facility: CLINIC | Age: 78
End: 2023-07-24

## 2023-07-24 VITALS
SYSTOLIC BLOOD PRESSURE: 135 MMHG | HEART RATE: 69 BPM | DIASTOLIC BLOOD PRESSURE: 70 MMHG | TEMPERATURE: 98 F | OXYGEN SATURATION: 99 % | HEIGHT: 59 IN | BODY MASS INDEX: 28.83 KG/M2 | WEIGHT: 143 LBS

## 2023-07-24 DIAGNOSIS — T14.8XXA BRUISING: Primary | ICD-10-CM

## 2023-07-24 LAB
BASOPHILS # BLD AUTO: 0.05 X10(3) UL (ref 0–0.2)
BASOPHILS NFR BLD AUTO: 0.7 %
DEPRECATED RDW RBC AUTO: 44.9 FL (ref 35.1–46.3)
EOSINOPHIL # BLD AUTO: 0.12 X10(3) UL (ref 0–0.7)
EOSINOPHIL NFR BLD AUTO: 1.6 %
ERYTHROCYTE [DISTWIDTH] IN BLOOD BY AUTOMATED COUNT: 13 % (ref 11–15)
HCT VFR BLD AUTO: 43 %
HGB BLD-MCNC: 13.2 G/DL
IMM GRANULOCYTES # BLD AUTO: 0.04 X10(3) UL (ref 0–1)
IMM GRANULOCYTES NFR BLD: 0.5 %
LYMPHOCYTES # BLD AUTO: 1.58 X10(3) UL (ref 1–4)
LYMPHOCYTES NFR BLD AUTO: 20.5 %
MCH RBC QN AUTO: 28.9 PG (ref 26–34)
MCHC RBC AUTO-ENTMCNC: 30.7 G/DL (ref 31–37)
MCV RBC AUTO: 94.3 FL
MONOCYTES # BLD AUTO: 0.67 X10(3) UL (ref 0.1–1)
MONOCYTES NFR BLD AUTO: 8.7 %
NEUTROPHILS # BLD AUTO: 5.23 X10 (3) UL (ref 1.5–7.7)
NEUTROPHILS # BLD AUTO: 5.23 X10(3) UL (ref 1.5–7.7)
NEUTROPHILS NFR BLD AUTO: 68 %
PLATELET # BLD AUTO: 270 10(3)UL (ref 150–450)
RBC # BLD AUTO: 4.56 X10(6)UL
WBC # BLD AUTO: 7.7 X10(3) UL (ref 4–11)

## 2023-07-24 PROCEDURE — 36415 COLL VENOUS BLD VENIPUNCTURE: CPT | Performed by: INTERNAL MEDICINE

## 2023-07-24 PROCEDURE — 99214 OFFICE O/P EST MOD 30 MIN: CPT | Performed by: INTERNAL MEDICINE

## 2023-07-24 PROCEDURE — 1126F AMNT PAIN NOTED NONE PRSNT: CPT | Performed by: INTERNAL MEDICINE

## 2023-07-24 RX ORDER — ANTIOX #8/OM3/DHA/EPA/LUT/ZEAX 250-2.5 MG
2 CAPSULE ORAL DAILY
COMMUNITY
Start: 2023-06-02

## 2023-07-24 RX ORDER — ANASTROZOLE 1 MG/1
1 TABLET ORAL DAILY
COMMUNITY
Start: 2023-05-19

## 2023-07-24 NOTE — PROGRESS NOTES
Subjective:     Patient ID: Norah Daily is a 77year old female. Hypertension    Bruising        History/Other:   She came in today for follow-up on her blood pressure. She is taking her medications regularly. She denies any complaints. She also states that she bruises very easily. She is not taking any aspirin. She is following with hematology but she does not have appointment until October. Review of Systems   Constitutional: Negative. HENT: Negative. Respiratory: Negative. Cardiovascular: Negative. Gastrointestinal: Negative. Genitourinary: Negative. Neurological: Negative. Hematological: Negative. Current Outpatient Medications   Medication Sig Dispense Refill    anastrozole 1 MG Oral Tab tab Take 1 tablet (1 mg total) by mouth daily. Multiple Vitamins-Minerals (PRESERVISION AREDS 2) Oral Cap Take 2 tablets by mouth daily. pantoprazole 40 MG Oral Tab EC Take 1 tablet (40 mg total) by mouth before breakfast. 90 tablet 1    fluticasone furoate-vilanterol (BREO ELLIPTA) 200-25 MCG/ACT Inhalation Aerosol Powder, Breath Activated Inhale 1 puff into the lungs daily. 60 each 3    amLODIPine 5 MG Oral Tab Take 1 tablet (5 mg total) by mouth in the morning and 1 tablet (5 mg total) before bedtime. 180 tablet 1    LOSARTAN 100 MG Oral Tab TAKE 1 TABLET DAILY 90 tablet 1    rosuvastatin 10 MG Oral Tab Take 1 tablet (10 mg total) by mouth nightly. 90 tablet 1    Meloxicam 7.5 MG Oral Tab       Levalbuterol Tartrate 45 MCG/ACT Inhalation Aerosol Inhale 1 puff into the lungs every 4 (four) hours as needed for Wheezing. 3 Inhaler 2    fexofenadine 180 MG Oral Tab Take 1 tablet (180 mg total) by mouth daily. Cholecalciferol (VITAMIN D) 1000 UNITS Oral Cap Take 1 tablet by mouth daily. Biotin 1000 MCG Oral Tab Take 1,000 mcg by mouth daily. Calcium 500-125 MG-UNIT Oral Tab Take 150 mg by mouth 2 (two) times daily.       Misc Natural Products (OSTEO BI-FLEX ADV DOUBLE ST OR) Take  by mouth 2 (two) times daily. Multiple Vitamin Oral Tab Take 1 tablet by mouth 3 (three) times a week. aspirin 81 MG Oral Tab Take 81 mg by mouth daily. (Patient not taking: Reported on 2023)       Allergies:  Azithromycin            HIVES  Hydrochlorothiazide     MYALGIA    Past Medical History:   Diagnosis Date    Asthma Not sure    Practically whole life    Biliary calculus Was recently detected    Supposedly stone in neck of gallblatter    Calculus of kidney     Cancer ([facility] [address]     Breast CA    Colon polyp Approx.     Detected and removed during colonoscopy    Essential hypertension ? On medication    Extrinsic asthma, unspecified     Hemorrhoids 2017    Detected during recent physical    High blood pressure     High cholesterol     Hx of motion sickness     Can't read in the car    Hyperlipidemia ? On medication    Kidney stones     multiple times has had kidney stones    Medicare annual wellness visit, subsequent 2017    Osteoarthritis     Other and unspecified hyperlipidemia     Pneumonia due to organism     Pseudocholinesterase deficiency     Patient denies, and doesn't recall any prior incidences and states MD never mentioned it to her.     Unspecified essential hypertension     Visual impairment     Glasses      Past Surgical History:   Procedure Laterality Date    COLONOSCOPY  Approx     COLONOSCOPY N/A 2018    Procedure: COLONOSCOPY;  Surgeon: Michelle Shore MD;  Location: [facility] ENDO    COLONOSCOPY N/A 2019    Procedure: COLONOSCOPY;  Surgeon: Michelle Shore MD;  Location: [facility] ENDO    EGD      LUMPECTOMY RIGHT Right 2022    Right DORIS LOC    TUBAL LIGATION  1982      Family History   Problem Relation Age of Onset    Breast Cancer Self 66    Dementia Mother     Heart Disorder Father 50        mi    High Cholesterol Father     Other (Other) Father     Cancer Sister           Bladder Cancer    Arthritis Sister     Cancer Brother 79        colon    Heart Disorder Brother 62        cabg    Cancer Brother         Skin Cancer    Prostate Cancer Brother     Heart Disorder Paternal Grandmother     Other (stroke) Paternal Grandmother 80    Heart Disorder Paternal Grandfather 52        mi    Breast Cancer Niece         42-54's brother's daughter    Breast Cancer Other         mat great grandmother       Social History:   Social History     Socioeconomic History    Marital status:    Tobacco Use    Smoking status: Former     Packs/day: 3.00     Years: 4.00     Pack years: 12.00     Types: Cigarettes     Quit date: 1967     Years since quittin.5    Smokeless tobacco: Never   Vaping Use    Vaping Use: Never used   Substance and Sexual Activity    Alcohol use: Not Currently     Alcohol/week: 0.0 standard drinks of alcohol    Drug use: Never    Sexual activity: Not Currently     Partners: Male        Objective:   Physical Exam  Vitals and nursing note reviewed. Constitutional:       Appearance: Normal appearance. HENT:      Head: Normocephalic and atraumatic. Cardiovascular:      Rate and Rhythm: Normal rate and regular rhythm. Pulses: Normal pulses. Heart sounds: Normal heart sounds. Pulmonary:      Effort: Pulmonary effort is normal.      Breath sounds: Normal breath sounds. Musculoskeletal:         General: Normal range of motion. Cervical back: Normal range of motion and neck supple. Skin:     General: Skin is warm. Neurological:      Mental Status: She is alert. Mental status is at baseline. Assessment & Plan:   No diagnosis found. Hypertension blood pressure controlled continue with current medication  Bruising we will check CBC and platelet count, avoid aspirin,  No orders of the defined types were placed in this encounter.       Meds This Visit:  Requested Prescriptions      No prescriptions requested or ordered in this encounter       Imaging & Referrals:  None

## 2023-09-18 DIAGNOSIS — I10 ESSENTIAL HYPERTENSION: ICD-10-CM

## 2023-09-18 DIAGNOSIS — E78.5 HYPERLIPIDEMIA, UNSPECIFIED HYPERLIPIDEMIA TYPE: ICD-10-CM

## 2023-09-19 RX ORDER — LOSARTAN POTASSIUM 100 MG/1
100 TABLET ORAL DAILY
Qty: 90 TABLET | Refills: 1 | Status: SHIPPED | OUTPATIENT
Start: 2023-09-19

## 2023-09-19 RX ORDER — ROSUVASTATIN CALCIUM 10 MG/1
10 TABLET, COATED ORAL NIGHTLY
Qty: 90 TABLET | Refills: 3 | Status: SHIPPED | OUTPATIENT
Start: 2023-09-19

## 2023-09-19 NOTE — TELEPHONE ENCOUNTER
Please review. Protocol failed / Has no protocol. Requested Prescriptions   Pending Prescriptions Disp Refills    losartan 100 MG Oral Tab [Pharmacy Med Name: LOSARTAN TAB 100MG] 90 tablet 1     Sig: Take 1 tablet (100 mg total) by mouth daily. Hypertensive Medications Protocol Failed - 9/18/2023  7:21 AM        Failed - CMP or BMP in past 6 months     No results found for this or any previous visit (from the past 4392 hour(s)).             Passed - In person appointment in the past 12 or next 3 months     Recent Outpatient Visits              1 month ago Lloyd Alfaro Austin, MD    Office Visit    4 months ago Breast cancer, stage 1, estrogen receptor positive, right [facility])    [facility] AND [facility] Hematology Oncology Jude Claire MD    Office Visit    4 months ago Drug-induced osteoporosis    [address] [facility] 15    Nurse Only    4 months ago Drug-induced osteoporosis    [address] Visit    4 months ago     [address] [place], [place]    Office Visit          Future Appointments         Provider Department Appt Notes    In 2 weeks [facility] OF THE [facility] [address] [place] [address]     In 2 months EM [facility] [address]    In 2 months AASHISH Cortez [facility] SYSTEM Hematology Oncology FOLLOW UP VISIT.cl  **please see in Infusion\"    In 2 months EM CC INFRN [address]    In 4 months Will Mariee MD [address] [facility]                     Passed - Last BP reading less than 140/90     BP Readings from Last 1 Encounters:  07/24/23 : 135/70              Passed - In person appointment or virtual visit in the past 6 months     Recent Outpatient Visits              1 month ago [place]    [facility], [address], [place]    Office Visit    4 months ago Breast cancer, stage 1, estrogen receptor positive, right [facility])    [facility] AND CLINICS Hematology Oncology Jw Charlton MD    Office Visit    4 months ago Drug-induced osteoporosis    [address] - Infusion    Nurse Only    4 months ago Drug-induced osteoporosis    [address] Visit    4 months ago     [address] [place], [place]    Office Visit          Future Appointments         Provider Department Appt Notes    In 2 weeks [facility] OF THE [facility] [address] [place] [address]     In 2 months EM [address] [place] [address] Rd    In 2 months [facility] Hematology Oncology FOLLOW UP VISIT.cl  **please see in Infusion\"    In 2 months EM CC INFRN [address]    In 4 months Caro Mendoza MD [facility], [address], [place]                     Passed - [facility] > 50     GFR Evaluation  EGFRCR: 86 , resulted on 5/22/2023           Signed Prescriptions Disp Refills    rosuvastatin 10 MG Oral Tab 90 tablet 3     Sig: Take 1 tablet (10 mg total) by mouth nightly.        Cholesterol Medication Protocol Passed - 9/18/2023  7:21 AM        Passed - ALT in past 12 months        Passed - LDL in past 12 months        Passed - Last ALT < 80     Lab Results   Component Value Date    ALT 27 02/24/2023             Passed - Last LDL < 130     Lab Results   Component Value Date    LDL 62 02/24/2023             Passed - In person appointment or virtual visit in the past 12 mos or appointment in next 3 mos     Recent Outpatient Visits              1 month ago Larissa Cohen MD    Office Visit    4 months ago Breast cancer, stage 1, estrogen receptor positive, right [facility])    [facility] AND [facility] Hematology Oncology Dmitri Brown MD    Office Visit    4 months ago Drug-induced osteoporosis    [address] - Infusion    Nurse Only    4 months ago Drug-induced osteoporosis    [address] Visit    4 months ago     [address] [place], [place]    Office Visit          Future Appointments         Provider Department Appt Notes    In 2 weeks [facility] OF THE [facility] [address] [place] [address]     In 2 months  [address]    In 2 months [facility] FOLLOW UP VISIT.cl  **please see in Infusion\"    In 2 months EM CC INFRN [address]    In 4 months Arlin Dumas MD [address], [address], [place]                        Future Appointments         Provider Department Appt Notes    In 2 weeks [facility] OF THE [facility] [address] [place] [address]     In 2 months  [address]    In 2 months [facility] FOLLOW UP VISIT.cl  **please see in Infusion\"    In 2 months  [address]    In 4 months Arlin Dumas MD [address], [address], [place]            Recent Outpatient Visits              1 month ago Krish Franco MD    Office Visit    4 months ago Breast cancer, stage 1, estrogen receptor positive, right [facility])    [facility] AND CLINICS Hematology Oncology Dmitri Brown MD    Office Visit    4 months ago Drug-induced osteoporosis    [address] - Infusion    Nurse Only    4 months ago Drug-induced osteoporosis    [address] - Infusion    Office Visit    4 months ago     [address] [place], [place]    Office Visit

## 2023-09-19 NOTE — TELEPHONE ENCOUNTER
Refill passed per [employer] protocol. Requested Prescriptions   Pending Prescriptions Disp Refills    LOSARTAN 100 MG Oral Tab [Pharmacy Med Name: LOSARTAN TAB 100MG] 90 tablet 1     Sig: TAKE 1 TABLET DAILY       Hypertensive Medications Protocol Failed - 9/18/2023  7:21 AM        Failed - CMP or BMP in past 6 months     No results found for this or any previous visit (from the past 4392 hour(s)).             Passed - In person appointment in the past 12 or next 3 months     Recent Outpatient Visits              1 month ago Genesis Velazquez, Jaden Desai MD    Office Visit    4 months ago Breast cancer, stage 1, estrogen receptor positive, right [facility])    [facility] AND [facility] Hematology Oncology Elvia Alas MD    Office Visit    4 months ago Drug-induced osteoporosis    [id number] [place] [address] 15    Nurse Only    4 months ago Drug-induced osteoporosis    [address] Visit    4 months ago     [address] [place], [place]    Office Visit          Future Appointments         Provider Department Appt Notes    In 2 weeks [facility] OF THE [facility] [address]     In 2 months EM [address] [place] [address] Rd    In 2 months Viridiana Vigil [facility] SYSTEM Hematology Oncology FOLLOW UP VISIT.cl  **please see in Infusion\"    In 2 months EM CC INFRN [address]    In 4 months Pollo Massey MD [address] [place] [address]                     Passed - Last BP reading less than 140/90     BP Readings from Last 1 Encounters:  07/24/23 : 135/70              Passed - In person appointment or virtual visit in the past 6 months     Recent Outpatient Visits              1 month ago [address] [facility] 60 [address],[facility] Liz Angulo MD    Office Visit    4 months ago Breast cancer, stage 1, estrogen receptor positive, right [facility])    [facility] AND [facility] Hematology Oncology Chano Tsang MD    Office Visit    4 months ago Drug-induced osteoporosis    [address] - Infusion    Nurse Only    4 months ago Drug-induced osteoporosis    [address] Visit    4 months ago     [address] [place], [place]    Office Visit          Future Appointments         Provider Department Appt Notes    In 2 weeks [facility] OF THE [facility] [address] [place] [address]     In 2 months EM [facility] [address]    In 2 months Elisa Gallagher [facility] Hematology Oncology FOLLOW UP VISIT.cl  **please see in Infusion\"    In 2 months EM CC INFRN [address]    In 4 months Liz Angulo MD [facility], [address], [place]                     Passed - [facility] > 50     GFR Evaluation  EGFRCR: 86 , resulted on 5/22/2023            ROSUVASTATIN 10 MG Oral Tab [Pharmacy Med Name: ROSUVASTATIN TAB 10MG] 90 tablet 1     Sig: TAKE 1 TABLET NIGHTLY       Cholesterol Medication Protocol Passed - 9/18/2023  7:21 AM        Passed - ALT in past 12 months        Passed - LDL in past 12 months        Passed - Last ALT < 80     Lab Results   Component Value Date    ALT 27 02/24/2023             Passed - Last LDL < 130     Lab Results   Component Value Date    LDL 62 02/24/2023             Passed - In person appointment or virtual visit in the past 12 mos or appointment in next 3 mos     Recent Outpatient Visits              1 month ago Primitivo Jackson MD    Office Visit    4 months ago Breast cancer, stage 1, estrogen receptor positive, right [facility])    [facility] AND [facility] Hematology Oncology My Mendieta MD    Office Visit    4 months ago Drug-induced osteoporosis    [address] - Infusion    Nurse Only    4 months ago Drug-induced osteoporosis    [address] Visit    4 months ago     [address] [place], [place]    Office Visit          Future Appointments         Provider Department Appt Notes    In 2 weeks [facility] OF THE [facility] [address]     In 2 months  [address]    In 2 months [facility] Hematology Oncology FOLLOW UP VISIT.cl  **please see in Infusion\"    In 2 months EM CC INFRN [address]    In 4 months Mykel Hodge MD [address], [address], [place]                        Future Appointments         Provider Department Appt Notes    In 2 weeks [facility] OF THE [facility] [address]     In 2 months  [address]    In 2 months [facility] Hematology Oncology FOLLOW UP VISIT.cl  **please see in Infusion\"    In 2 months EM CC INFRN [address]    In 4 months Mykel Hodge MD [address], [address], [place]           Recent Outpatient Visits              1 month ago Lloyd Hedrick Austin, MD    Office Visit    4 months ago Breast cancer, stage 1, estrogen receptor positive, right [facility])    [facility] AND CLINICS Hematology Oncology My Mendieta MD    Office Visit    4 months ago Drug-induced osteoporosis    [address] [place] [address]    Nurse Only    4 months ago Drug-induced osteoporosis    [address] [place] [address] 15 Office Visit    4 months ago     [address] [place], [place]    Office Visit

## 2023-10-03 ENCOUNTER — HOSPITAL ENCOUNTER (OUTPATIENT)
Dept: ULTRASOUND IMAGING | Facility: HOSPITAL | Age: 78
Discharge: HOME | End: 2023-10-03
Payer: MEDICARE

## 2023-10-03 ENCOUNTER — HOSPITAL ENCOUNTER (OUTPATIENT)
Dept: MAMMOGRAPHY | Facility: HOSPITAL | Age: 78
Discharge: HOME | End: 2023-10-03
Payer: MEDICARE

## 2023-10-03 DIAGNOSIS — Z17.0 MALIGNANT NEOPLASM OF UPPER-OUTER QUADRANT OF RIGHT BREAST IN FEMALE, ESTROGEN RECEPTOR POSITIVE: ICD-10-CM

## 2023-10-03 DIAGNOSIS — C50.411 MALIGNANT NEOPLASM OF UPPER-OUTER QUADRANT OF RIGHT BREAST IN FEMALE, ESTROGEN RECEPTOR POSITIVE: ICD-10-CM

## 2023-10-03 PROCEDURE — 76642 ULTRASOUND BREAST LIMITED: CPT

## 2023-10-03 PROCEDURE — 77062 BREAST TOMOSYNTHESIS BI: CPT

## 2023-10-03 PROCEDURE — 77066 DX MAMMO INCL CAD BI: CPT

## 2023-10-05 ENCOUNTER — TELEPHONE (OUTPATIENT)
Dept: HEMATOLOGY/ONCOLOGY | Facility: HOSPITAL | Age: 78
End: 2023-10-05

## 2023-10-11 ENCOUNTER — MED REC SCAN ONLY (OUTPATIENT)
Dept: INTERNAL MEDICINE CLINIC | Facility: CLINIC | Age: 78
End: 2023-10-11

## 2023-10-16 DIAGNOSIS — C50.411 MALIGNANT NEOPLASM OF UPPER-OUTER QUADRANT OF RIGHT BREAST IN FEMALE, ESTROGEN RECEPTOR POSITIVE: Primary | ICD-10-CM

## 2023-10-16 DIAGNOSIS — Z17.0 MALIGNANT NEOPLASM OF UPPER-OUTER QUADRANT OF RIGHT BREAST IN FEMALE, ESTROGEN RECEPTOR POSITIVE: Primary | ICD-10-CM

## 2023-10-30 DIAGNOSIS — I10 ESSENTIAL HYPERTENSION: ICD-10-CM

## 2023-11-01 RX ORDER — AMLODIPINE BESYLATE 5 MG/1
5 TABLET ORAL NIGHTLY
Qty: 90 TABLET | Refills: 3 | Status: SHIPPED | OUTPATIENT
Start: 2023-11-01

## 2023-11-01 NOTE — TELEPHONE ENCOUNTER
Please review refill protocol failed/ no protocol  Requested Prescriptions   Pending Prescriptions Disp Refills    AMLODIPINE 5 MG Oral Tab [Pharmacy Med Name: AMLODIPINE TAB 5MG] 90 tablet 1     Sig: TAKE 1 TABLET NIGHTLY       Hypertensive Medications Protocol Failed - 10/30/2023  9:32 AM        Failed - CMP or BMP in past 6 months     No results found for this or any previous visit (from the past 4392 hour(s)). Passed - In person appointment in the past 12 or next 3 months     Recent Outpatient Visits              3 months ago Lloyd Alfaro Austin, MD    Office Visit    5 months ago Breast cancer, stage 1, estrogen receptor positive, right [facility])    [facility] AND [facility] Hematology Oncology Sterling Kohler MD    Office Visit    5 months ago Drug-induced osteoporosis    [address] [facility] 15    Nurse Only    5 months ago Drug-induced osteoporosis    [address] Visit    5 months ago     [address] [place], [place]    Office Visit          Future Appointments         Provider Department Appt Notes    In 3 weeks EM [address] [place] [address] Rd    In 3 weeks [facility] SYSTEM Hematology Oncology FOLLOW UP VISIT.cl  **please see in Infusion\"    In 3 weeks EM CC INFRN [address]    In 4 weeks [facility] Hematology Oncology Survivorship Appt (Cnfmd w/pt call of 10/13/23).     In 2 months Purnima Nieves MD [address]     In 5 months [address] for Health order in [place], [place]                      Passed - Last BP reading less than 140/90     BP Readings from Last 1 Encounters:  10/24/23 : 122/76              Passed - In person appointment or virtual visit in the past 6 months     Recent Outpatient Visits              3 months ago [address], Jaden Desai MD    Office Visit    5 months ago Breast cancer, stage 1, estrogen receptor positive, right [facility])    [facility] Hematology Oncology Naty Ann MD    Office Visit    5 months ago Drug-induced osteoporosis    [address]    Nurse Only    5 months ago Drug-induced osteoporosis    [address] Visit    5 months ago     [address] [place], [place]    Office Visit          Future Appointments         Provider Department Appt Notes    In 3 weeks [address]    In 3 weeks [facility] Hematology Oncology FOLLOW UP VISIT.cl  **please see in Infusion\"    In 3 weeks  CC INFRN [address]    In 4 weeks [facility] Hematology Oncology Survivorship Appt (Cnd w/pt call of 10/13/23).     In 2 months Tina Bamberger, MD [address]     In 5 months [address] for Health order in [facility] or [facility] > 50     GFR Evaluation  EGFRCR: 86 , resulted on 5/22/2023

## 2023-11-04 DIAGNOSIS — J45.20 MILD INTERMITTENT ASTHMA WITH ALLERGIC RHINITIS WITHOUT COMPLICATION: ICD-10-CM

## 2023-11-06 RX ORDER — FLUTICASONE FUROATE AND VILANTEROL 200; 25 UG/1; UG/1
1 POWDER RESPIRATORY (INHALATION) DAILY
Qty: 1 EACH | Refills: 3 | Status: SHIPPED | OUTPATIENT
Start: 2023-11-06

## 2023-11-06 NOTE — TELEPHONE ENCOUNTER
Refill passed per [employer] protocol. Requested Prescriptions   Pending Prescriptions Disp Refills    BREO ELLIPTA 200-25 MCG/ACT Inhalation Aerosol Powder, Breath Activated [Pharmacy Med Name: Graciela Holguin 200-25]  3     Sig: USE 1 INHALATION ORALLY    DAILY       Asthma & COPD Medication Protocol Passed - 11/4/2023  1:17 AM        Passed - In person appointment or virtual visit in the past 6 mos or appointment in next 3 mos     Recent Outpatient Visits              3 months ago Genesis [address], Jaden Desai MD    Office Visit    5 months ago Breast cancer, stage 1, estrogen receptor positive, right [facility])    [facility] Hematology Oncology Jude Claire MD    Office Visit    5 months ago Drug-induced osteoporosis    [address] [facility] 15    Nurse Only    5 months ago Drug-induced osteoporosis    [address] Visit    5 months ago     [address] [place], [place]    Office Visit          Future Appointments         Provider Department Appt Notes    In 2 weeks EM [address] [place] [address] Rd    In 2 weeks [address] Hematology Oncology FOLLOW UP VISIT.cl  **please see in Infusion\"    In 2 weeks EM CC INFRN [address]    In 3 weeks Lucia Lopez [facility] Hematology Oncology Survivorship Appt (Cnfmd w/pt call of 10/13/23).     In 2 months Will Mariee MD [address] [facility], [address]     In 4 months [address] for Health order in [facility],                          Recent Outpatient Visits              3 months ago Shi Cardenas MD    Office Visit    5 months ago Breast cancer, stage 1, estrogen receptor positive, right [facility])    [facility] AND CLINICS Hematology Oncology Foster Gonzáles MD    Office Visit    5 months ago Drug-induced osteoporosis    [address]    Nurse Only    5 months ago Drug-induced osteoporosis    [address] Visit    5 months ago     [address] [place], [place]    Office Visit          Future Appointments         Provider Department Appt Notes    In 2 weeks EM [address] [place] [address] Rd    [facility] Hematology Oncology FOLLOW UP VISIT.cl  **please see in Infusion\"    In 2 weeks EM CC INFRN [address]    In 3 weeks Lee Li [facility] Hematology Oncology Survivorship Appt (Cnfmd w/pt call of 10/13/23).     In 2 months Mario Amado MD [address]     In 4 months [address] for Health order in [place], [place]

## 2023-11-21 NOTE — PROGRESS NOTES
Cancer Center Progress Note    Patient Name: Katelyn Garvin   YOB: 1945   Medical Record Number: [id number]     Chief Complaint:  Breast cancer, AI monitoring      Oncology History:  77year old with screen detected right breast cancer s/p right lumpectomy: pT1cN0, g2, ER/HI 98%, her2 neg, Ki 10%. Radiation omission. arimidex started 10/2022    Interim HPI:  23  Here for follow up on AI  Compliant with arimidex. Tolerating it well. No SE. Doing well overall. Still some discomfort to surg site at times. No changes in breast exam. No other issues.  present for encounter. PMH/PSH: Breast ca, asthma, renal caculi, HTN, OA  Family History: Sister  of bladder cancer, Niece with breast ca in her 45s  Social History: Does all her ADLs, helps take care of her  who is in a WC. Current Outpatient Medications:     fluticasone furoate-vilanterol (BREO ELLIPTA) 200-25 MCG/ACT Inhalation Aerosol Powder, Breath Activated, Inhale 1 puff into the lungs daily. , Disp: 1 each, Rfl: 3    amLODIPine 5 MG Oral Tab, Take 1 tablet (5 mg total) by mouth nightly., Disp: 90 tablet, Rfl: 3    losartan 100 MG Oral Tab, Take 1 tablet (100 mg total) by mouth daily. , Disp: 90 tablet, Rfl: 1    rosuvastatin 10 MG Oral Tab, Take 1 tablet (10 mg total) by mouth nightly., Disp: 90 tablet, Rfl: 3    anastrozole 1 MG Oral Tab tab, Take 1 tablet (1 mg total) by mouth daily. , Disp: , Rfl:     Multiple Vitamins-Minerals (PRESERVISION AREDS 2) Oral Cap, Take 2 tablets by mouth daily. , Disp: , Rfl:     pantoprazole 40 MG Oral Tab EC, Take 1 tablet (40 mg total) by mouth before breakfast., Disp: 90 tablet, Rfl: 1    Meloxicam 7.5 MG Oral Tab, , Disp: , Rfl:     Levalbuterol Tartrate 45 MCG/ACT Inhalation Aerosol, Inhale 1 puff into the lungs every 4 (four) hours as needed for Wheezing., Disp: 3 Inhaler, Rfl: 2    fexofenadine 180 MG Oral Tab, Take 1 tablet (180 mg total) by mouth daily. , Disp: , Rfl: Cholecalciferol (VITAMIN D) 1000 UNITS Oral Cap, Take 1 tablet by mouth daily. , Disp: , Rfl:     Biotin 1000 MCG Oral Tab, Take 1,000 mcg by mouth daily. , Disp: , Rfl:     Calcium 500-125 MG-UNIT Oral Tab, Take 150 mg by mouth 2 (two) times daily. , Disp: , Rfl:     Misc Natural Products (OSTEO BI-FLEX ADV DOUBLE ST OR), Take  by mouth 2 (two) times daily. , Disp: , Rfl:     Multiple Vitamin Oral Tab, Take 1 tablet by mouth 3 (three) times a week., Disp: , Rfl:     Allergies   Allergen Reactions    Azithromycin HIVES    Hydrochlorothiazide MYALGIA        Review of Systems: Oncology specific ROS negative except as per HPI    LMP  (LMP Unknown)   Wt Readings from Last 6 Encounters:   10/17/23 63.5 kg (140 lb)   07/24/23 64.9 kg (143 lb)   04/03/23 62 kg (136 lb 9.6 oz)   02/07/23 63.5 kg (140 lb)   01/09/23 64.9 kg (143 lb)   11/07/22 68 kg (150 lb)   General: Patient is alert and oriented x 3, not in acute distress. Chest: Symmetric expansion, nonlabored breathing  Breast; s/p R lumpectomy. Scar tissues noted to R axilla. L breast unremarkable. Extremities: bilateral symmetric leg swelling  Neurological: motor strength grossly intact, MA4E  Psych: appropriate mood and affect      Impression and Plan:   Cancer Staging   Breast cancer, stage 1, estrogen receptor positive, right   Staging form: Breast, AJCC 8th Edition  - Pathologic: Stage IA (pT1c, pN0, cM0, G2, ER+, LA+, HER2-) - Signed by Sterling Kohler MD on 10/6/2022  radiation omission, on anastrazole 10/2022, tolerating. Repeat dx R breast mmg 6mo, 4/2024. Drug induced osteopenia  - on zometa for osteopenia.   Infusion today, repeat in may 2024, repeat BMD 2024, continue vit D    MDM: mod, AI management, problem with Se of txt    FU in 6mo with Dr. Grewal Seat

## 2023-11-22 ENCOUNTER — OFFICE VISIT (OUTPATIENT)
Dept: HEMATOLOGY/ONCOLOGY | Facility: HOSPITAL | Age: 78
End: 2023-11-22
Attending: INTERNAL MEDICINE
Payer: MEDICARE

## 2023-11-22 ENCOUNTER — OFFICE VISIT (OUTPATIENT)
Dept: HEMATOLOGY/ONCOLOGY | Facility: HOSPITAL | Age: 78
End: 2023-11-22
Attending: PHYSICIAN ASSISTANT
Payer: MEDICARE

## 2023-11-22 VITALS
HEIGHT: 59.02 IN | HEART RATE: 77 BPM | DIASTOLIC BLOOD PRESSURE: 77 MMHG | BODY MASS INDEX: 29.07 KG/M2 | SYSTOLIC BLOOD PRESSURE: 155 MMHG | WEIGHT: 144.19 LBS | RESPIRATION RATE: 16 BRPM | OXYGEN SATURATION: 97 % | TEMPERATURE: 98 F

## 2023-11-22 DIAGNOSIS — T50.905A DRUG-INDUCED OSTEOPOROSIS: ICD-10-CM

## 2023-11-22 DIAGNOSIS — M81.8 DRUG-INDUCED OSTEOPOROSIS: ICD-10-CM

## 2023-11-22 DIAGNOSIS — M81.8 DRUG-INDUCED OSTEOPOROSIS: Primary | ICD-10-CM

## 2023-11-22 DIAGNOSIS — T50.905A DRUG-INDUCED OSTEOPOROSIS: Primary | ICD-10-CM

## 2023-11-22 DIAGNOSIS — Z79.811 USE OF ANASTROZOLE: ICD-10-CM

## 2023-11-22 DIAGNOSIS — C50.911 BREAST CANCER, STAGE 1, ESTROGEN RECEPTOR POSITIVE, RIGHT: Primary | ICD-10-CM

## 2023-11-22 DIAGNOSIS — Z17.0 BREAST CANCER, STAGE 1, ESTROGEN RECEPTOR POSITIVE, RIGHT: Primary | ICD-10-CM

## 2023-11-22 LAB
CALCIUM BLD-MCNC: 10 MG/DL (ref 8.7–10.4)
CREAT BLD-MCNC: 0.75 MG/DL
EGFRCR SERPLBLD CKD-EPI 2021: 81 ML/MIN/1.73M2 (ref 60–?)

## 2023-11-22 PROCEDURE — 82565 ASSAY OF CREATININE: CPT

## 2023-11-22 PROCEDURE — 99214 OFFICE O/P EST MOD 30 MIN: CPT | Performed by: PHYSICIAN ASSISTANT

## 2023-11-22 PROCEDURE — 96374 THER/PROPH/DIAG INJ IV PUSH: CPT

## 2023-11-22 PROCEDURE — 82310 ASSAY OF CALCIUM: CPT

## 2023-11-22 NOTE — PROGRESS NOTES
Pt here for Z1tfouv Zometa. Pt denies any issues or concerns. No dental complaints. Pt tolerated infusion without difficulty or complaint.  Reviewed next apt date/time: 6 months with labs and MD      Education Record    Learner:  Patient    Disease / Diagnosis:    Barriers / Limitations:  None       Method:  Discussion      General Topics:  Medication and Side effects and symptom management      Outcome:  Shows understanding

## 2023-11-28 ENCOUNTER — APPOINTMENT (OUTPATIENT)
Dept: HEMATOLOGY/ONCOLOGY | Facility: HOSPITAL | Age: 78
End: 2023-11-28
Attending: NURSE PRACTITIONER
Payer: MEDICARE

## 2024-01-02 ENCOUNTER — OFFICE VISIT (OUTPATIENT)
Dept: HEMATOLOGY/ONCOLOGY | Facility: HOSPITAL | Age: 79
End: 2024-01-02
Attending: INTERNAL MEDICINE
Payer: MEDICARE

## 2024-01-02 DIAGNOSIS — Z08 ENCOUNTER FOR FOLLOW-UP EXAMINATION AFTER COMPLETED TREATMENT FOR MALIGNANT NEOPLASM: ICD-10-CM

## 2024-01-02 DIAGNOSIS — Z85.3 PERSONAL HISTORY OF BREAST CANCER: Primary | ICD-10-CM

## 2024-01-02 DIAGNOSIS — Z71.9 COUNSELING, UNSPECIFIED: ICD-10-CM

## 2024-01-02 PROCEDURE — 99215 OFFICE O/P EST HI 40 MIN: CPT | Performed by: NURSE PRACTITIONER

## 2024-01-02 PROCEDURE — G2212 PROLONG OUTPT/OFFICE VIS: HCPCS | Performed by: NURSE PRACTITIONER

## 2024-01-02 RX ORDER — PREDNISOLONE ACETATE 10 MG/ML
SUSPENSION/ DROPS OPHTHALMIC
COMMUNITY
Start: 2023-12-11

## 2024-01-02 RX ORDER — OFLOXACIN 3 MG/ML
SOLUTION/ DROPS OPHTHALMIC
COMMUNITY
Start: 2023-12-11

## 2024-01-03 RX ORDER — PANTOPRAZOLE SODIUM 40 MG/1
40 TABLET, DELAYED RELEASE ORAL
Qty: 90 TABLET | Refills: 3 | Status: SHIPPED | OUTPATIENT
Start: 2024-01-03

## 2024-01-03 NOTE — TELEPHONE ENCOUNTER
Refill passed per [facility] protocol.  Requested Prescriptions   Pending Prescriptions Disp Refills    PANTOPRAZOLE 40 MG Oral Tab EC [Pharmacy Med Name: PANTOPRAZOLE TAB 40MG DR] 90 tablet 1     Sig: TAKE 1 TABLET BEFORE       BREAKFAST       Gastrointestional Medication Protocol Passed - 1/3/2024  1:17 AM        Passed - In person appointment or virtual visit in the past 12 mos or appointment in next 3 mos     Recent Outpatient Visits              Yesterday Personal history of breast cancer    [facility] Hematology Oncology Anel Mccord APRN    Office Visit    1 month ago Breast cancer, stage 1, estrogen receptor positive, right     [facility] Hematology Oncology Vanessa Flynn PA-C    Office Visit    1 month ago Drug-induced osteoporosis    [facility] Star[facility]    Office Visit    1 month ago Drug-induced osteoporosis    [facility] Star[facility]    Nurse Only    5 months ago Bruising    [facility] Janey Martines MD    Office Visit          Future Appointments         Provider Department Appt Notes    In 2 weeks Janey Martines MD [facility] last px 1-9-23    In 3 months [address] [facility] order in [facility],     In 4 months EM CC LAB2 [facility]addy Star[facility] [place] CREATININE-SERUM-CALCIUM-PIV-SL    In 4 months Eugenie Rawls MD [facility] Hematology Oncology FOLLOW UP VISIT. LVM TO RESCHEDULE TO MD. YAMILETH    In 4 months EM CC INFRN 4 Maddy Star[facility] [id number]                  Future Appointments         Provider Department Appt Notes    In 2 weeks Janey Martines MD [facility] last px 1-9-23    In 3 months [address] [facility] order in [facility],     In 4 months [facility] LAB2 [facility] [place] CREATININE-SERUM-CALCIUM-PIV-SL    In 4 months Eugenie Rawls MD [facility] Hematology Oncology FOLLOW UP VISIT. LVM TO RESCHEDULE TO MD. CARSON    In 4 months EM CC INFRN 4 Maddy Starkey [facility] [id number]          Recent Outpatient Visits              Yesterday Personal history of breast cancer    [facility] Hematology Oncology [address], APRN    Office Visit    1 month ago Breast cancer, stage 1, estrogen receptor positive, right     [facility] Hematology Oncology Vanessa Flynn PA-C    Office Visit    1 month ago Drug-induced osteoporosis    [facility] Starkey [facility]    Office Visit    1 month ago Drug-induced osteoporosis    [facility] Starkey [facility]    Nurse Only    5 months ago Bruising    [facility] Janey Martines MD    Office Visit

## 2024-01-22 ENCOUNTER — OFFICE VISIT (OUTPATIENT)
Dept: INTERNAL MEDICINE CLINIC | Facility: CLINIC | Age: 79
End: 2024-01-22

## 2024-01-22 VITALS
DIASTOLIC BLOOD PRESSURE: 70 MMHG | TEMPERATURE: 98 F | SYSTOLIC BLOOD PRESSURE: 130 MMHG | WEIGHT: 144 LBS | BODY MASS INDEX: 26.5 KG/M2 | HEIGHT: 62 IN | OXYGEN SATURATION: 96 % | HEART RATE: 68 BPM

## 2024-01-22 DIAGNOSIS — Z00.00 ANNUAL PHYSICAL EXAM: Primary | ICD-10-CM

## 2024-01-22 DIAGNOSIS — Z00.00 ENCOUNTER FOR ANNUAL HEALTH EXAMINATION: ICD-10-CM

## 2024-01-22 DIAGNOSIS — E55.9 VITAMIN D DEFICIENCY: ICD-10-CM

## 2024-01-22 DIAGNOSIS — Z78.0 MENOPAUSE: ICD-10-CM

## 2024-01-22 PROBLEM — I35.0 AORTIC STENOSIS, MILD: Status: ACTIVE | Noted: 2023-02-21

## 2024-01-22 PROBLEM — I20.0 UNSTABLE ANGINA PECTORIS (HCC): Status: ACTIVE | Noted: 2022-08-26

## 2024-01-22 NOTE — PATIENT INSTRUCTIONS
Maddy Rose's SCREENING SCHEDULE   Tests on this list are recommended by your physician but may not be covered, or covered at this frequency, by your insurer.   Please check with your insurance carrier before scheduling to verify coverage.   PREVENTATIVE SERVICES FREQUENCY &  COVERAGE DETAILS LAST COMPLETION DATE   Diabetes Screening    Fasting Blood Sugar /  Glucose    One screening every 12 months if never tested or if previously tested but not diagnosed with pre-diabetes   One screening every 6 months if diagnosed with pre-diabetes Lab Results   Component Value Date     (H) 02/24/2023        Cardiovascular Disease Screening    Lipid Panel  Cholesterol  Lipoprotein (HDL)  Triglycerides Covered every 5 years for all Medicare beneficiaries without apparent signs or symptoms of cardiovascular disease Lab Results   Component Value Date    CHOLEST 146 02/24/2023    HDL 67 (H) 02/24/2023    LDL 62 02/24/2023    TRIG 93 02/24/2023         Electrocardiogram (EKG)   Covered if needed at Welcome to Medicare, and non-screening if indicated for medical reasons 08/04/2022      Ultrasound Screening for Abdominal Aortic Aneurysm (AAA) Covered once in a lifetime for one of the following risk factors   • Men who are 65-75 years old and have ever smoked   • Anyone with a family history -     Colorectal Cancer Screening  Covered for ages 50-85; only need ONE of the following:    Colonoscopy   Covered every 10 years    Covered every 2 years if patient is at high risk or previous colonoscopy was abnormal 12/18/2019    Health Maintenance   Topic Date Due   • Colorectal Cancer Screening  12/18/2024       Flexible Sigmoidoscopy   Covered every 4 years -    Fecal Occult Blood Test Covered annually -   Bone Density Screening    Bone density screening    Covered every 2 years after age 65 if diagnosed with risk of osteoporosis or estrogen deficiency.    Covered yearly for long-term glucocorticoid medication use (Steroids)  Last Dexa Scan:    XR DEXA BONE DENSITOMETRY (CPT=77080) 08/02/2021      No recommendations at this time   Pap and Pelvic    Pap   Covered every 2 years for women at normal risk; Annually if at high risk -  No recommendations at this time    Chlamydia Annually if high risk -  No recommendations at this time   Screening Mammogram    Mammogram     Recommend annually for all female patients aged 40 and older    One baseline mammogram covered for patients aged 35-39 10/03/2023    No recommendations at this time    Immunizations    Influenza Covered once per flu season  Please get every year 09/21/2023  No recommendations at this time    Pneumococcal Each vaccine ([id number] & [id number]) covered once after 65 Prevnar 13: 08/01/2017    [id number]: 03/01/2021     No recommendations at this time    Hepatitis B One screening covered for patients with certain risk factors   -  No recommendations at this time    Tetanus Toxoid Not covered by Medicare Part B unless medically necessary (cut with metal); may be covered with your pharmacy prescription benefits -    Tetanus, Diptheria and Pertusis TD and TDaP Not covered by Medicare Part B -  No recommendations at this time    Zoster Not covered by Medicare Part B; may be covered with your pharmacy  prescription benefits -  Zoster Vaccines(1 of 2) Never done     Annual Monitoring of Persistent Medications (ACE/ARB, digoxin diuretics, anticonvulsants)    Potassium Annually Lab Results   Component Value Date    K 3.6 02/24/2023         Creatinine   Annually Lab Results   Component Value Date    CREATSERUM 0.75 11/22/2023         BUN Annually Lab Results   Component Value Date    BUN 17 02/24/2023       Drug Serum Conc Annually No results found for: \"DIGOXIN\", \"DIG\", \"VALP\"         Chronic Obstructive Pulmonary Disease (COPD)    Spirometry Annually Spirometry date: 02/14/2020

## 2024-01-22 NOTE — PROGRESS NOTES
Subjective:   Maddy Rose is a 78 year old female who presents for a Medicare Subsequent Annual Wellness visit (Pt already had Initial Annual Wellness) and scheduled follow up of multiple significant but stable problems.       History/Other:   Fall Risk Assessment:   She has been screened for Falls and is low risk.      Cognitive Assessment:   Abnormal  What day of the week is this?: Correct  What month is it?: Correct  What year is it?: Correct  Recall \"Ball\": Correct  Recall \"Flag\": Correct  Recall \"Tree\": Incorrect    Functional Ability/Status:   Maddy Rose has some abnormal functions as listed below:  She has Hearing problems based on screening of functional status.She has Vision problems based on screening of functional status. She has Walking problems based on screening of functional status.       Depression Screening (PHQ-2/PHQ-9): PHQ-2 SCORE: 1, done 1/22/2024   Feeling down, depressed, or hopeless: Several days (due to 's illness)  Last [place] Suicide Screening on 1/22/2024 was No Risk.     5 minutes spent screening and counseling for depression    Advanced Directives:   She does have a Living Will but we do NOT have it on file in Epic.    She does NOT have a Power of  for Health Care. [ ]  Discussed Advance Care Planning with patient (and family/surrogate if present). Standard forms made available to patient in After Visit Summary.      Patient Active Problem List   Diagnosis    Hypertension    Hyperlipidemia    Asthma with allergic rhinitis    Menopause    Atherosclerosis of coronary artery    Aortic stenosis    Dense breast tissue on mammogram    Calculus of gallbladder without cholecystitis without obstruction    Mixed conductive and sensorineural hearing loss of both ears    Diverticulosis of large intestine    Atherosclerosis of abdominal aorta (HCC)    Stenosis of celiac artery (HCC)    Left ventricular hypertrophy due to hypertensive disease, without heart  failure    Senile purpura (HCC)    Bruit of left carotid artery    Other emphysema (HCC)    Asthmatic bronchitis , chronic    Drug-induced osteoporosis    Breast cancer, stage 1, estrogen receptor positive, right  (HCC)    Use of anastrozole    Unstable angina pectoris (HCC)    Aortic stenosis, mild     Allergies:  She is allergic to azithromycin and hydrochlorothiazide.    Current Medications:  Outpatient Medications Marked as Taking for the 1/22/24 encounter (Office Visit) with Janey Martines MD   Medication Sig    pantoprazole 40 MG Oral Tab EC Take 1 tablet (40 mg total) by mouth before breakfast.    prednisoLONE 1 % Ophthalmic Suspension INSTILL 1 DROP IN LEFT EYE FOUR TIMES A DAY START EYE DROPS AFTER SURGERY, USE FOR ONE MONTH    fluticasone furoate-vilanterol (BREO ELLIPTA) 200-25 MCG/ACT Inhalation Aerosol Powder, Breath Activated Inhale 1 puff into the lungs daily.    amLODIPine 5 MG Oral Tab Take 1 tablet (5 mg total) by mouth nightly.    losartan 100 MG Oral Tab Take 1 tablet (100 mg total) by mouth daily.    rosuvastatin 10 MG Oral Tab Take 1 tablet (10 mg total) by mouth nightly.    anastrozole 1 MG Oral Tab tab Take 1 tablet (1 mg total) by mouth daily.    Multiple Vitamins-Minerals (PRESERVISION AREDS 2) Oral Cap Take 2 tablets by mouth daily.    Levalbuterol Tartrate 45 MCG/ACT Inhalation Aerosol Inhale 1 puff into the lungs every 4 (four) hours as needed for Wheezing.    fexofenadine 180 MG Oral Tab Take 1 tablet (180 mg total) by mouth daily.    Cholecalciferol (VITAMIN D) 1000 UNITS Oral Cap Take 1 tablet by mouth daily.      Biotin 1000 MCG Oral Tab Take 1,000 mcg by mouth daily.      Calcium 500-125 MG-UNIT Oral Tab Take 150 mg by mouth 2 (two) times daily.    Misc Natural Products (OSTEO BI-FLEX ADV DOUBLE ST OR) Take by mouth 2 (two) times daily.    Multiple Vitamin Oral Tab Take 1 tablet by mouth 3 (three) times a week.       Medical History:  She  has a past medical history of Asthma (Not  sure), Biliary calculus (Was recently detected), Breast CA (HCC), Calculus of kidney, Cancer (HCC), Colon polyp (Approx. 2009), Essential hypertension (2005?), Extrinsic asthma, unspecified, Hemorrhoids (2017), High blood pressure, High cholesterol, motion sickness, Hyperlipidemia (2001?), Kidney stones, Medicare annual wellness visit, subsequent (08/01/2017), Osteoarthritis, Other and unspecified hyperlipidemia, Pneumonia due to organism, Pseudocholinesterase deficiency, Unspecified essential hypertension, and Visual impairment.  Surgical History:  She  has a past surgical history that includes tubal ligation (01/01/1982); colonoscopy (Approx 2009); egd (2009); colonoscopy (N/A, 1/17/2018); colonoscopy (N/A, 12/18/2019); and lumpectomy right (Right, 09/19/2022).   Family History:  Her family history includes Arthritis in her sister; Breast Cancer in her niece and another family member; Breast Cancer (age of onset: 78) in her self; Cancer in her brother and sister; Cancer (age of onset: 70) in her brother; Dementia in her mother; Heart Disorder in her paternal grandmother; Heart Disorder (age of onset: 47) in her paternal grandfather; Heart Disorder (age of onset: 48) in her father; Heart Disorder (age of onset: 57) in her brother; High Cholesterol in her father; Other in her father; Prostate Cancer (age of onset: 60) in her brother; stroke (age of onset: 82) in her paternal grandmother.  Social History:  She  reports that she quit smoking about 57 years ago. Her smoking use included cigarettes. She has a 12 pack-year smoking history. She has never used smokeless tobacco. She reports that she does not currently use alcohol. She reports that she does not use drugs.    Tobacco:  She smoked tobacco in the past but quit greater than 12 months ago.  Social History    Tobacco Use      Smoking status: Former        Packs/day: 3.00        Years: 4.00        Additional pack years: 0.00        Total pack years: 12.00         Types: Cigarettes        Quit date: 1967        Years since quittin.0      Smokeless tobacco: Never       CAGE Alcohol Screen:   CAGE screening score of 0 on 1/15/2024, showing low risk of alcohol abuse.      Patient Care Team:  Janey Martines MD as PCP - General (Internal Medicine)  Josh Cesar MD (Interventional, Cardiology)  Lindsay Oates PT as Physical Therapist (Physical Therapy)  Yomi Agudelo MD (Radiation Oncology)    Review of Systems  GENERAL: feels well otherwise  SKIN: denies any unusual skin lesions  EYES: denies blurred vision or double vision  HEENT: denies nasal congestion, sinus pain or ST  LUNGS: denies shortness of breath with exertion  CARDIOVASCULAR: denies chest pain on exertion  GI: denies abdominal pain, denies heartburn  : denies dysuria, vaginal discharge or itching, no complaint of urinary incontinence   MUSCULOSKELETAL: denies back pain  NEURO: denies headaches  PSYCHE: denies depression or anxiety  HEMATOLOGIC: denies hx of anemia  ENDOCRINE: denies thyroid history  ALL/ASTHMA: denies hx of allergy or asthma    Objective:   Physical Exam  General Appearance:  Alert, cooperative, no distress, appears stated age   Head:  Normocephalic, without obvious abnormality, atraumatic   Eyes:  PERRL, conjunctiva/corneas clear, EOM's intact both eyes   Ears:  Normal TM's and external ear canals, both ears   Nose: Nares normal, septum midline,mucosa normal, no drainage or sinus tenderness   Throat: Lips, mucosa, and tongue normal; teeth and gums normal   Neck: Supple, symmetrical, trachea midline, no adenopathy;  thyroid: not enlarged, symmetric, no tenderness/mass/nodules; no carotid bruit or JVD   Back:   Symmetric, no curvature, ROM normal, no CVA tenderness   Lungs:   Clear to auscultation bilaterally, respirations unlabored   Heart:  Regular rate and rhythm, S1 and S2 normal, no murmur, rub, or gallop   Abdomen:   Soft, non-tender, bowel sounds active all four quadrants,   no masses, no organomegaly   Pelvic: Deferred   Extremities: Extremities normal, atraumatic, no cyanosis or edema   Pulses: 2+ and symmetric   Skin: Skin color, texture, turgor normal, no rashes or lesions   Lymph nodes: Cervical, supraclavicular, and axillary nodes normal   Neurologic: Normal       /66 (BP Location: Left arm, Patient Position: Sitting, Cuff Size: adult)   Pulse 68   Temp 98.1 °F (36.7 °C) (Temporal)   Ht 5' 2\" (1.575 m)   Wt 144 lb (65.3 kg)   LMP  (LMP Unknown)   SpO2 96%   BMI 26.34 kg/m²  Estimated body mass index is 26.34 kg/m² as calculated from the following:    Height as of this encounter: 5' 2\" (1.575 m).    Weight as of this encounter: 144 lb (65.3 kg).    Medicare Hearing Assessment:   Hearing Screening    Screening Method: Questionnaire  I have a problem hearing over the telephone: No I have trouble following the conversations when two or more people are talking at the same time: No   I have trouble understanding things on the TV: No I have to strain to understand conversations: No   I have to worry about missing the telephone ring or doorbell: No I have trouble hearing conversations in a noisy background such as a crowded room or restaurant: No   I get confused about where sounds come from: No I misunderstand some words in a sentence and need to ask people to repeat themselves: No   I especially have trouble understanding the speech of women and children: No I have trouble understanding the speaker in a large room such as at a meeting or place of [employer]: No   Many people I talk to seem to mumble (or don't speak clearly): No People get annoyed because I misunderstand what they say: No   I misunderstand what others are saying and make inappropriate responses: No I avoid social activities because I cannot hear well and fear I will reply improperly: No   Family members and friends have told me they think I may have hearing loss: No             Visual Acuity:     Right Eye  Chart Acuity: 20/30     Left Eye Chart Acuity: 20/30     Both Eyes Chart Acuity: 20/30            Assessment & Plan:   Maddy Rose is a 78 year old female who presents for a Medicare Assessment.     Aortic stenosis  stable asymptomatic she is following with cardiology,scheduled 2 d echo      Asthmatic bronchitis, chronic HCC stable ,continue with Breo    Atherosclerosis of abdominal aorta(HCC)-asymptomatic blood pressure controlled, continue with  meds     Atherosclerosis of coronary artery-asymptomatic ,she is followig with cardiology      Calculus of the gallbladder without cholecystitis without obstruction ,she has no symptoms we will continue to monitor      hyperlipidemiaStable continue with current care     Hyper tension stable monitor blood pressure at home and bring logbook next visit watch diet avoid salty food continue with current medication     Left ventricular hypertrophy due to hypertensive disease, without heart failure her last 2D echo reviewed monitor blood pressure she is following with cardiology     Mixed conductive and sensorineural hearing loss of both ears she has hearing aids     Other emphysema history of moderate COPD- continue inhalers     Senile purpura (hcc) stable     Stenosis  of celiac artery HCC stable   Right breast cancer status post right lumpectomy/on hormonal therapy - she is following with oncology  There are no diagnoses linked to this encounter.  The patient indicates understanding of these issues and agrees to the plan.  Reinforced healthy diet, lifestyle, and exercise.      No follow-ups on file.     ABHISHEK MARK MD, 1/22/2024     Supplementary Documentation:   General Health:          Maddy Rose's SCREENING SCHEDULE   Tests on this list are recommended by your physician but may not be covered, or covered at this frequency, by your insurer.   Please check with your insurance carrier before scheduling to verify coverage.   PREVENTATIVE SERVICES FREQUENCY  &  COVERAGE DETAILS LAST COMPLETION DATE   Diabetes Screening    Fasting Blood Sugar /  Glucose    One screening every 12 months if never tested or if previously tested but not diagnosed with pre-diabetes   One screening every 6 months if diagnosed with pre-diabetes Lab Results   Component Value Date     (H) 02/24/2023        Cardiovascular Disease Screening    Lipid Panel  Cholesterol  Lipoprotein (HDL)  Triglycerides Covered every 5 years for all Medicare beneficiaries without apparent signs or symptoms of cardiovascular disease Lab Results   Component Value Date    CHOLEST 146 02/24/2023    HDL 67 (H) 02/24/2023    LDL 62 02/24/2023    TRIG 93 02/24/2023         Electrocardiogram (EKG)   Covered if needed at Welcome to Medicare, and non-screening if indicated for medical reasons 08/04/2022      Ultrasound Screening for Abdominal Aortic Aneurysm (AAA) Covered once in a lifetime for one of the following risk factors    Men who are 65-75 years old and have ever smoked    Anyone with a family history -     Colorectal Cancer Screening  Covered for ages 50-85; only need ONE of the following:    Colonoscopy   Covered every 10 years    Covered every 2 years if patient is at high risk or previous colonoscopy was abnormal 12/18/2019    Health Maintenance   Topic Date Due    Colorectal Cancer Screening  12/18/2024       Flexible Sigmoidoscopy   Covered every 4 years -    Fecal Occult Blood Test Covered annually -   Bone Density Screening    Bone density screening    Covered every 2 years after age 65 if diagnosed with risk of osteoporosis or estrogen deficiency.    Covered yearly for long-term glucocorticoid medication use (Steroids) Last Dexa Scan:    XR DEXA BONE DENSITOMETRY (CPT=77080) 08/02/2021      No recommendations at this time   Pap and Pelvic    Pap   Covered every 2 years for women at normal risk; Annually if at high risk -  No recommendations at this time    Chlamydia Annually if high risk -  No  recommendations at this time   Screening Mammogram    Mammogram     Recommend annually for all female patients aged 40 and older    One baseline mammogram covered for patients aged 35-39 10/03/2023    No recommendations at this time    Immunizations    Influenza Covered once per flu season  Please get every year 09/21/2023  No recommendations at this time    Pneumococcal Each vaccine ([id number] & [id number]) covered once after 65 Prevnar 13: 08/01/2017    [id number]: 03/01/2021     No recommendations at this time    Hepatitis B One screening covered for patients with certain risk factors   -  No recommendations at this time    Tetanus Toxoid Not covered by Medicare Part B unless medically necessary (cut with metal); may be covered with your pharmacy prescription benefits -    Tetanus, Diptheria and Pertusis TD and TDaP Not covered by Medicare Part B -  No recommendations at this time    Zoster Not covered by Medicare Part B; may be covered with your pharmacy  prescription benefits -  Zoster Vaccines(1 of 2) Never done     Annual Monitoring of Persistent Medications (ACE/ARB, digoxin diuretics, anticonvulsants)    Potassium Annually Lab Results   Component Value Date    K 3.6 02/24/2023         Creatinine   Annually Lab Results   Component Value Date    CREATSERUM 0.75 11/22/2023         BUN Annually Lab Results   Component Value Date    BUN 17 02/24/2023       Drug Serum Conc Annually No results found for: \"DIGOXIN\", \"DIG\", \"VALP\"           Chronic Obstructive Pulmonary Disease (COPD)    Spirometry Annually Spirometry date: 02/14/2020

## 2024-02-12 RX ORDER — ANASTROZOLE 1 MG/1
1 TABLET ORAL DAILY
Qty: 90 TABLET | Refills: 3 | Status: SHIPPED | OUTPATIENT
Start: 2024-02-12

## 2024-02-12 RX ORDER — ANASTROZOLE 1 MG/1
1 TABLET ORAL DAILY
Status: CANCELLED | OUTPATIENT
Start: 2024-02-12

## 2024-02-25 DIAGNOSIS — J45.20 MILD INTERMITTENT ASTHMA WITH ALLERGIC RHINITIS WITHOUT COMPLICATION (HCC): ICD-10-CM

## 2024-02-25 DIAGNOSIS — I10 ESSENTIAL HYPERTENSION: ICD-10-CM

## 2024-02-26 RX ORDER — FLUTICASONE FUROATE AND VILANTEROL 200; 25 UG/1; UG/1
1 POWDER RESPIRATORY (INHALATION) DAILY
Qty: 3 EACH | Refills: 3 | Status: SHIPPED | OUTPATIENT
Start: 2024-02-26

## 2024-02-26 RX ORDER — LOSARTAN POTASSIUM 100 MG/1
100 TABLET ORAL DAILY
Qty: 90 TABLET | Refills: 3 | Status: SHIPPED | OUTPATIENT
Start: 2024-02-26

## 2024-02-26 NOTE — TELEPHONE ENCOUNTER
Protocol Failed/ No Protocol    Requested Prescriptions   Pending Prescriptions Disp Refills    BREO ELLIPTA 200-25 MCG/ACT Inhalation Aerosol Powder, Breath Activated [Pharmacy Med Name: BREO ELLIPTA -25]  3     Sig: USE 1 INHALATION ORALLY    DAILY       Asthma & COPD Medication Protocol Failed - 2/25/2024  7:07 AM        Failed - Asthma Action Score greater than or equal to 20        Failed - AAP/ACT given in last 12 months     No data recorded  No data recorded  No data recorded  No data recorded          Passed - Appointment in past 6 or next 3 months      Recent Outpatient Visits              1 month ago Annual physical exam    [facility] Janey Martines MD    Office Visit    1 month ago Personal history of breast cancer    [facility] Hematology Oncology BrainAnel Velasco APRN    Office Visit    3 months ago Breast cancer, stage 1, estrogen receptor positive, right (HCC)    [facility] Hematology Oncology Vanessa Flynn PA-C    Office Visit    3 months ago Drug-induced osteoporosis    Maddy Starkey [facility] - Infusion    Office Visit    3 months ago Drug-induced osteoporosis    Maddy ALEJANDRO Starkey [facility] - Infusion    Nurse Only          Future Appointments         Provider Department Appt Notes    In 2 weeks ADO DORIS RM1; ADO DEXA RM1 [facility] DEXA - [place] order in [facility],     In 2 weeks ADO SCHEDULED [facility], [place] orders in epic,     In 1 month [facility] DORIS RM1 [facility] Mammography - [place] [facility]     In 2 months EM CC LAB2 Maddy Starkey [facility] - Infusion SD CREATININE-SERUM-CALCIUM-PIV-SL    In 2 months Eugenie Rawls MD [facility] Hematology Oncology FOLLOW UP VISIT. LVM TO RESCHEDULE TO MD. YAMILETH    In 2 months EM CC INFRN 4 Maddy Starkey [facility] - Infusion SD-ZOMETA-PIV-SL    In 4 months Janey Martines MD [facility],  [facility] 6 MONTH FU                 LOSARTAN 100 MG Oral Tab [Pharmacy Med Name: LOSARTAN TAB 100MG] 90 tablet 1     Sig: TAKE 1 TABLET DAILY       Hypertension Medications Protocol Failed - 2/25/2024  7:07 AM        Failed - CMP or BMP in past 12 months        Passed - Last BP reading less than 140/90     BP Readings from Last 1 Encounters:   01/22/24 130/70               Passed - In person appointment or virtual visit in the past 12 mos or appointment in next 3 mos     Recent Outpatient Visits              1 month ago Annual physical exam    [facility] Janey Martines MD    Office Visit    1 month ago Personal history of breast cancer    [facility] Hematology Oncology Anel Mccord APRN    Office Visit    3 months ago Breast cancer, stage 1, estrogen receptor positive, right (HCC)    [facility] Hematology Oncology Vanessa Flynn PA-C    Office Visit    3 months ago Drug-induced osteoporosis    Maddy Starkey [facility] - Infusion    Office Visit    3 months ago Drug-induced osteoporosis    Maddy ALEJANDRO Starkey [facility] - Infusion    Nurse Only          Future Appointments         Provider Department Appt Notes    In 2 weeks ADO [facility] RM1; ADO DEXA 1 [facility] DEXA - Sundeep order in [facility],     In 2 weeks ADO SCHEDULED [facility] [place] orders in epic,     In 1 month [facility] RM1 [facility]     In 2 months EM CC LAB2 Maddy ALEJANDRO Starkey [facility] - Infusion SD CREATININE-SERUM-CALCIUM-PIV-SL    In 2 months Eugenie Rawls MD [facility] Hematology Oncology FOLLOW UP VISIT. LVM TO RESCHEDULE TO MD. CARSON    In 2 months EM CC INFRN 4 Maddy ALEJANDRO Starkey [facility] - Infusion SD-ZOMETA-PIV-SL    In 4 months Janey Martines MD [facility] 6 MONTH FU               Passed - EGFRCR or GFRNAA > 50     GFR  Evaluation  EGFRCR: 81 , resulted on 11/22/2023               Future Appointments         Provider Department Appt Notes    In 2 weeks ADO [facility]1; ADO DEXA [address] DEXA - [place] order in [facility],     In 2 weeks ADO SCHEDULED [facility] [place] orders in epic,     In 1 month [facility] RM1 [facility]     In 2 months EM CC LAB2 Maddy Starkey [email or web address] - Infusion [place] CREATININE-SERUM-CALCIUM-PIV-SL    In 2 months Eugenie Rawls MD [facility] Hematology Oncology FOLLOW UP VISIT. LVM TO RESCHEDULE TO MD. YAMILETH    In 2 months EM CC INFRN 4 Maddy Starkey [email or web address] - Infusion SD-ZOMETA-PIV-SL    In 4 months Janey Martines MD [facility] 6 MONTH FU          Recent Outpatient Visits              1 month ago Annual physical exam    [facility] Janey Martines MD    Office Visit    1 month ago Personal history of breast cancer    [facility] Hematology Oncology Anel Mccord APRN    Office Visit    3 months ago Breast cancer, stage 1, estrogen receptor positive, right (HCC)    [facility] Hematology Oncology Vanessa Flynn PASabasC    Office Visit    3 months ago Drug-induced osteoporosis    Maddy Starkey [email or web address] - Infusion    Office Visit    3 months ago Drug-induced osteoporosis    Maddy Starkey [email or web address] - Infusion    Nurse Only

## 2024-03-13 ENCOUNTER — HOSPITAL ENCOUNTER (OUTPATIENT)
Dept: BONE DENSITY | Age: 79
Discharge: HOME | End: 2024-03-13
Attending: INTERNAL MEDICINE
Payer: MEDICARE

## 2024-03-13 ENCOUNTER — LAB ENCOUNTER (OUTPATIENT)
Dept: LAB | Age: 79
End: 2024-03-13
Attending: INTERNAL MEDICINE
Payer: MEDICARE

## 2024-03-13 DIAGNOSIS — E55.9 VITAMIN D DEFICIENCY: ICD-10-CM

## 2024-03-13 DIAGNOSIS — Z78.0 MENOPAUSE: ICD-10-CM

## 2024-03-13 DIAGNOSIS — Z00.00 ANNUAL PHYSICAL EXAM: ICD-10-CM

## 2024-03-13 LAB
ALBUMIN SERPL-MCNC: 4.9 G/DL (ref 3.2–4.8)
ALBUMIN/GLOB SERPL: 1.8 {RATIO} (ref 1–2)
ALP LIVER SERPL-CCNC: 70 U/L
ALT SERPL-CCNC: 17 U/L
ANION GAP SERPL CALC-SCNC: 8 MMOL/L (ref 0–18)
AST SERPL-CCNC: 24 U/L (ref ?–34)
BASOPHILS # BLD AUTO: 0.06 X10(3) UL (ref 0–0.2)
BASOPHILS NFR BLD AUTO: 0.5 %
BILIRUB SERPL-MCNC: 0.9 MG/DL (ref 0.2–1.1)
BUN BLD-MCNC: 12 MG/DL (ref 9–23)
BUN/CREAT SERPL: 16.4 (ref 10–20)
CALCIUM BLD-MCNC: 9.5 MG/DL (ref 8.7–10.4)
CHLORIDE SERPL-SCNC: 107 MMOL/L (ref 98–112)
CHOLEST SERPL-MCNC: 157 MG/DL (ref ?–200)
CO2 SERPL-SCNC: 27 MMOL/L (ref 21–32)
CREAT BLD-MCNC: 0.73 MG/DL
DEPRECATED RDW RBC AUTO: 44.4 FL (ref 35.1–46.3)
EGFRCR SERPLBLD CKD-EPI 2021: 84 ML/MIN/1.73M2 (ref 60–?)
EOSINOPHIL # BLD AUTO: 0.11 X10(3) UL (ref 0–0.7)
EOSINOPHIL NFR BLD AUTO: 1 %
ERYTHROCYTE [DISTWIDTH] IN BLOOD BY AUTOMATED COUNT: 13.8 % (ref 11–15)
FASTING PATIENT LIPID ANSWER: YES
FASTING STATUS PATIENT QL REPORTED: YES
GLOBULIN PLAS-MCNC: 2.7 G/DL (ref 2.8–4.4)
GLUCOSE BLD-MCNC: 106 MG/DL (ref 70–99)
HCT VFR BLD AUTO: 38.9 %
HDLC SERPL-MCNC: 64 MG/DL (ref 40–59)
HGB BLD-MCNC: 12.7 G/DL
IMM GRANULOCYTES # BLD AUTO: 0.04 X10(3) UL (ref 0–1)
IMM GRANULOCYTES NFR BLD: 0.4 %
LDLC SERPL CALC-MCNC: 75 MG/DL (ref ?–100)
LYMPHOCYTES # BLD AUTO: 1.7 X10(3) UL (ref 1–4)
LYMPHOCYTES NFR BLD AUTO: 15.5 %
MCH RBC QN AUTO: 28.7 PG (ref 26–34)
MCHC RBC AUTO-ENTMCNC: 32.6 G/DL (ref 31–37)
MCV RBC AUTO: 88 FL
MONOCYTES # BLD AUTO: 0.79 X10(3) UL (ref 0.1–1)
MONOCYTES NFR BLD AUTO: 7.2 %
NEUTROPHILS # BLD AUTO: 8.26 X10 (3) UL (ref 1.5–7.7)
NEUTROPHILS # BLD AUTO: 8.26 X10(3) UL (ref 1.5–7.7)
NEUTROPHILS NFR BLD AUTO: 75.4 %
NONHDLC SERPL-MCNC: 93 MG/DL (ref ?–130)
OSMOLALITY SERPL CALC.SUM OF ELEC: 294 MOSM/KG (ref 275–295)
PLATELET # BLD AUTO: 275 10(3)UL (ref 150–450)
POTASSIUM SERPL-SCNC: 3.8 MMOL/L (ref 3.5–5.1)
PROT SERPL-MCNC: 7.6 G/DL (ref 5.7–8.2)
RBC # BLD AUTO: 4.42 X10(6)UL
SODIUM SERPL-SCNC: 142 MMOL/L (ref 136–145)
TRIGL SERPL-MCNC: 99 MG/DL (ref 30–149)
TSI SER-ACNC: 0.74 MIU/ML (ref 0.55–4.78)
VIT D+METAB SERPL-MCNC: 38.8 NG/ML (ref 30–100)
VLDLC SERPL CALC-MCNC: 15 MG/DL (ref 0–30)
WBC # BLD AUTO: 11 X10(3) UL (ref 4–11)

## 2024-03-13 PROCEDURE — 80053 COMPREHEN METABOLIC PANEL: CPT

## 2024-03-13 PROCEDURE — 36415 COLL VENOUS BLD VENIPUNCTURE: CPT

## 2024-03-13 PROCEDURE — 77080 DXA BONE DENSITY AXIAL: CPT | Performed by: INTERNAL MEDICINE

## 2024-03-13 PROCEDURE — 85025 COMPLETE CBC W/AUTO DIFF WBC: CPT

## 2024-03-13 PROCEDURE — 82306 VITAMIN D 25 HYDROXY: CPT

## 2024-03-13 PROCEDURE — 84443 ASSAY THYROID STIM HORMONE: CPT

## 2024-03-13 PROCEDURE — 80061 LIPID PANEL: CPT

## 2024-03-19 DIAGNOSIS — I10 ESSENTIAL HYPERTENSION: ICD-10-CM

## 2024-03-22 RX ORDER — AMLODIPINE BESYLATE 5 MG/1
5 TABLET ORAL 2 TIMES DAILY
Qty: 180 TABLET | Refills: 1 | Status: SHIPPED | OUTPATIENT
Start: 2024-03-22

## 2024-03-22 NOTE — TELEPHONE ENCOUNTER
Contacted patient to discuss, she states she is taking BID. Pended for review if okay as last dose in her charts is listed as daily.

## 2024-04-03 ENCOUNTER — HOSPITAL ENCOUNTER (OUTPATIENT)
Dept: MAMMOGRAPHY | Facility: HOSPITAL | Age: 79
Discharge: HOME | End: 2024-04-03
Attending: SURGERY
Payer: MEDICARE

## 2024-04-03 DIAGNOSIS — C50.411 MALIGNANT NEOPLASM OF UPPER-OUTER QUADRANT OF RIGHT BREAST IN FEMALE, ESTROGEN RECEPTOR POSITIVE (HCC): ICD-10-CM

## 2024-04-03 DIAGNOSIS — Z17.0 MALIGNANT NEOPLASM OF UPPER-OUTER QUADRANT OF RIGHT BREAST IN FEMALE, ESTROGEN RECEPTOR POSITIVE (HCC): ICD-10-CM

## 2024-04-03 PROCEDURE — 77065 DX MAMMO INCL CAD UNI: CPT | Performed by: SURGERY

## 2024-04-03 PROCEDURE — 77061 BREAST TOMOSYNTHESIS UNI: CPT | Performed by: SURGERY

## 2024-04-08 ENCOUNTER — LAB ENCOUNTER (OUTPATIENT)
Dept: LAB | Age: 79
End: 2024-04-08
Attending: INTERNAL MEDICINE
Payer: MEDICARE

## 2024-04-08 DIAGNOSIS — D72.828 OTHER ELEVATED WHITE BLOOD CELL (WBC) COUNT: ICD-10-CM

## 2024-04-08 LAB
BASOPHILS # BLD AUTO: 0.06 X10(3) UL (ref 0–0.2)
BASOPHILS NFR BLD AUTO: 0.6 %
DEPRECATED RDW RBC AUTO: 45.3 FL (ref 35.1–46.3)
EOSINOPHIL # BLD AUTO: 0.16 X10(3) UL (ref 0–0.7)
EOSINOPHIL NFR BLD AUTO: 1.6 %
ERYTHROCYTE [DISTWIDTH] IN BLOOD BY AUTOMATED COUNT: 13.8 % (ref 11–15)
HCT VFR BLD AUTO: 40.3 %
HGB BLD-MCNC: 12.4 G/DL
IMM GRANULOCYTES # BLD AUTO: 0.02 X10(3) UL (ref 0–1)
IMM GRANULOCYTES NFR BLD: 0.2 %
LYMPHOCYTES # BLD AUTO: 1.93 X10(3) UL (ref 1–4)
LYMPHOCYTES NFR BLD AUTO: 19.4 %
MCH RBC QN AUTO: 27.7 PG (ref 26–34)
MCHC RBC AUTO-ENTMCNC: 30.8 G/DL (ref 31–37)
MCV RBC AUTO: 90.2 FL
MONOCYTES # BLD AUTO: 0.86 X10(3) UL (ref 0.1–1)
MONOCYTES NFR BLD AUTO: 8.7 %
NEUTROPHILS # BLD AUTO: 6.91 X10 (3) UL (ref 1.5–7.7)
NEUTROPHILS # BLD AUTO: 6.91 X10(3) UL (ref 1.5–7.7)
NEUTROPHILS NFR BLD AUTO: 69.5 %
PLATELET # BLD AUTO: 270 10(3)UL (ref 150–450)
RBC # BLD AUTO: 4.47 X10(6)UL
WBC # BLD AUTO: 9.9 X10(3) UL (ref 4–11)

## 2024-04-08 PROCEDURE — 85025 COMPLETE CBC W/AUTO DIFF WBC: CPT

## 2024-04-08 PROCEDURE — 36415 COLL VENOUS BLD VENIPUNCTURE: CPT

## 2024-04-17 ENCOUNTER — OFFICE VISIT (OUTPATIENT)
Dept: INTERNAL MEDICINE CLINIC | Facility: CLINIC | Age: 79
End: 2024-04-17

## 2024-04-17 VITALS
BODY MASS INDEX: 27.97 KG/M2 | HEART RATE: 82 BPM | OXYGEN SATURATION: 95 % | DIASTOLIC BLOOD PRESSURE: 85 MMHG | TEMPERATURE: 99 F | WEIGHT: 152 LBS | HEIGHT: 62 IN | SYSTOLIC BLOOD PRESSURE: 135 MMHG

## 2024-04-17 DIAGNOSIS — L30.9 ECZEMA, UNSPECIFIED TYPE: Primary | ICD-10-CM

## 2024-04-17 PROCEDURE — 99213 OFFICE O/P EST LOW 20 MIN: CPT | Performed by: INTERNAL MEDICINE

## 2024-04-17 RX ORDER — TRIAMCINOLONE ACETONIDE 1 MG/G
CREAM TOPICAL 2 TIMES DAILY PRN
Qty: 60 G | Refills: 0 | Status: SHIPPED | OUTPATIENT
Start: 2024-04-17

## 2024-04-17 NOTE — PROGRESS NOTES
Subjective:     Patient ID: Maddy Rose is a 78 year old female.    Swelling        History/Other: She came in today complaining of a rash on right leg on the lateral side. According  To her it started 3 weeks ago.  She states  that she was getting gel injection on her knee and she states that soon as she started taking the injection she noticed initially rash on above her knee and after that below the knee on the lateral side.  It is itchy it is not painful.  She discussed with orthopedic doctor they told her it could be due to the band that she is using      Review of Systems   Constitutional: Negative.    HENT: Negative.     Eyes: Negative.    Respiratory: Negative.     Cardiovascular: Negative.    Gastrointestinal: Negative.    Endocrine: Negative.    Neurological: Negative.    Hematological: Negative.      Current Outpatient Medications   Medication Sig Dispense Refill    triamcinolone 0.1 % External Cream Apply topically 2 (two) times daily as needed. 60 g 0    amLODIPine 5 MG Oral Tab Take 1 tablet (5 mg total) by mouth 2 (two) times daily. 180 tablet 1    fluticasone furoate-vilanterol (BREO ELLIPTA) 200-25 MCG/ACT Inhalation Aerosol Powder, Breath Activated Inhale 1 puff into the lungs daily. 3 each 3    losartan 100 MG Oral Tab Take 1 tablet (100 mg total) by mouth daily. 90 tablet 3    anastrozole 1 MG Oral Tab tab Take 1 tablet (1 mg total) by mouth daily. 90 tablet 3    pantoprazole 40 MG Oral Tab EC Take 1 tablet (40 mg total) by mouth before breakfast. 90 tablet 3    prednisoLONE 1 % Ophthalmic Suspension INSTILL 1 DROP IN LEFT EYE FOUR TIMES A DAY START EYE DROPS AFTER SURGERY, USE FOR ONE MONTH      rosuvastatin 10 MG Oral Tab Take 1 tablet (10 mg total) by mouth nightly. 90 tablet 3    Multiple Vitamins-Minerals (PRESERVISION AREDS 2) Oral Cap Take 2 tablets by mouth daily.      Levalbuterol Tartrate 45 MCG/ACT Inhalation Aerosol Inhale 1 puff into the lungs every 4 (four) hours as needed  for Wheezing. 3 Inhaler 2    fexofenadine 180 MG Oral Tab Take 1 tablet (180 mg total) by mouth daily.      Cholecalciferol (VITAMIN D) 1000 UNITS Oral Cap Take 1 tablet by mouth daily.        Biotin 1000 MCG Oral Tab Take 1,000 mcg by mouth daily.        Calcium 500-125 MG-UNIT Oral Tab Take 150 mg by mouth 2 (two) times daily.      Misc Natural Products (OSTEO BI-FLEX ADV DOUBLE ST OR) Take by mouth 2 (two) times daily.      Multiple Vitamin Oral Tab Take 1 tablet by mouth 3 (three) times a week.       Allergies:  Allergies   Allergen Reactions    Azithromycin HIVES    Hydrochlorothiazide MYALGIA       Past Medical History:    Asthma (HCC)    Practically whole life    Biliary calculus    Supposedly stone in neck of gallblatter    Breast CA (HCC)    Calculus of kidney    Cancer (HCC)    Breast CA    Colon polyp    Detected and removed during colonoscopy    Essential hypertension    On medication    Extrinsic asthma, unspecified    Hemorrhoids    Detected during recent physical    High blood pressure    High cholesterol    Hx of motion sickness    Can't read in the car    Hyperlipidemia    On medication    Kidney stones    multiple times has had kidney stones    Medicare annual wellness visit, subsequent    Osteoarthritis    Other and unspecified hyperlipidemia    Pneumonia due to organism    Pseudocholinesterase deficiency    Patient denies, and doesn't recall any prior incidences and states MD never mentioned it to her.    Unspecified essential hypertension    Visual impairment    Glasses      Past Surgical History:   Procedure Laterality Date    Colonoscopy  Approx 2009    Colonoscopy N/A 1/17/2018    Procedure: COLONOSCOPY;  Surgeon: Aaron Cordova MD;  Location: [facility] ENDO    Colonoscopy N/A 12/18/2019    Procedure: COLONOSCOPY;  Surgeon: Aaron Cordova MD;  Location: [facility] ENDO    Egd  2009    Lumpectomy right Right 09/19/2022    Right DORIS LOC    Tubal ligation  01/01/1982      Family  History   Problem Relation Age of Onset    Breast Cancer Self 77    Dementia Mother     Heart Disorder Father 48        mi    High Cholesterol Father     Other (Other) Father     Cancer Sister          2016 Bladder Cancer    Arthritis Sister     Cancer Brother 70        colon    Heart Disorder Brother 57        cabg    Cancer Brother         Skin Cancer    Prostate Cancer Brother 60    Heart Disorder Paternal Grandmother     Other (stroke) Paternal Grandmother 82    Heart Disorder Paternal Grandfather 47        mi    Breast Cancer Niece         40-50's brother's daughter    Breast Cancer Other         mat great grandmother       Social History:   Social History     Socioeconomic History    Marital status:    Tobacco Use    Smoking status: Former     Current packs/day: 0.00     Average packs/day: 3.0 packs/day for 4.0 years (12.0 ttl pk-yrs)     Types: Cigarettes     Start date: 1963     Quit date: 1967     Years since quittin.3    Smokeless tobacco: Never   Vaping Use    Vaping status: Never Used   Substance and Sexual Activity    Alcohol use: Not Currently     Alcohol/week: 0.0 standard drinks of alcohol    Drug use: Never    Sexual activity: Not Currently     Partners: Male        Objective:   Physical Exam  Constitutional:       Appearance: Normal appearance.   HENT:      Head: Normocephalic and atraumatic.   Cardiovascular:      Rate and Rhythm: Normal rate and regular rhythm.      Pulses: Normal pulses.      Heart sounds: Normal heart sounds.   Pulmonary:      Effort: Pulmonary effort is normal.      Breath sounds: Normal breath sounds.   Abdominal:      Palpations: Abdomen is soft.   Musculoskeletal:         General: No swelling.      Cervical back: Normal range of motion and neck supple.   Skin:     Findings: Erythema and rash present.          Neurological:      Mental Status: She is alert. Mental status is at baseline.         Assessment & Plan:   1. Eczema, unspecified type    We  will try topical corticosteroid, referral for dermatology    No orders of the defined types were placed in this encounter.      Meds This Visit:  Requested Prescriptions     Signed Prescriptions Disp Refills    triamcinolone 0.1 % External Cream 60 g 0     Sig: Apply topically 2 (two) times daily as needed.       Imaging & Referrals:  [facility] - INTERNAL

## 2024-05-22 ENCOUNTER — OFFICE VISIT (OUTPATIENT)
Dept: HEMATOLOGY/ONCOLOGY | Facility: HOSPITAL | Age: 79
End: 2024-05-22
Attending: NURSE PRACTITIONER

## 2024-05-22 ENCOUNTER — OFFICE VISIT (OUTPATIENT)
Dept: HEMATOLOGY/ONCOLOGY | Facility: HOSPITAL | Age: 79
End: 2024-05-22
Attending: INTERNAL MEDICINE

## 2024-05-22 VITALS
BODY MASS INDEX: 30.04 KG/M2 | OXYGEN SATURATION: 97 % | SYSTOLIC BLOOD PRESSURE: 145 MMHG | HEIGHT: 59.02 IN | TEMPERATURE: 98 F | HEART RATE: 64 BPM | WEIGHT: 149 LBS | RESPIRATION RATE: 16 BRPM | DIASTOLIC BLOOD PRESSURE: 50 MMHG

## 2024-05-22 DIAGNOSIS — Z79.811 LONG TERM CURRENT USE OF AROMATASE INHIBITOR: ICD-10-CM

## 2024-05-22 DIAGNOSIS — Z79.83 ENCOUNTER FOR MONITORING BISPHOSPHONATE THERAPY: ICD-10-CM

## 2024-05-22 DIAGNOSIS — T50.905A DRUG-INDUCED OSTEOPOROSIS: Primary | ICD-10-CM

## 2024-05-22 DIAGNOSIS — M81.8 DRUG-INDUCED OSTEOPOROSIS: Primary | ICD-10-CM

## 2024-05-22 DIAGNOSIS — Z51.81 ENCOUNTER FOR MONITORING BISPHOSPHONATE THERAPY: ICD-10-CM

## 2024-05-22 DIAGNOSIS — C50.311 MALIGNANT NEOPLASM OF LOWER-INNER QUADRANT OF RIGHT BREAST OF FEMALE, ESTROGEN RECEPTOR POSITIVE (HCC): Primary | ICD-10-CM

## 2024-05-22 DIAGNOSIS — Z98.890 HISTORY OF LUMPECTOMY OF RIGHT BREAST: ICD-10-CM

## 2024-05-22 DIAGNOSIS — M85.80 OSTEOPENIA DUE TO CANCER THERAPY: ICD-10-CM

## 2024-05-22 DIAGNOSIS — Z17.0 MALIGNANT NEOPLASM OF LOWER-INNER QUADRANT OF RIGHT BREAST OF FEMALE, ESTROGEN RECEPTOR POSITIVE (HCC): Primary | ICD-10-CM

## 2024-05-22 LAB
CALCIUM BLD-MCNC: 9.8 MG/DL (ref 8.7–10.4)
CREAT BLD-MCNC: 0.73 MG/DL
EGFRCR SERPLBLD CKD-EPI 2021: 84 ML/MIN/1.73M2 (ref 60–?)

## 2024-05-22 PROCEDURE — 82565 ASSAY OF CREATININE: CPT

## 2024-05-22 PROCEDURE — 96374 THER/PROPH/DIAG INJ IV PUSH: CPT

## 2024-05-22 PROCEDURE — G2211 COMPLEX E/M VISIT ADD ON: HCPCS | Performed by: INTERNAL MEDICINE

## 2024-05-22 PROCEDURE — 82310 ASSAY OF CALCIUM: CPT

## 2024-05-22 PROCEDURE — 99214 OFFICE O/P EST MOD 30 MIN: CPT | Performed by: INTERNAL MEDICINE

## 2024-05-22 NOTE — PROGRESS NOTES
Pt here for Zometa . Pt denies any issues or concerns or any recent or upcoming dental work.      Ordering MD: Sinai  Order Exp: Today (last tx)     Pt tolerated infusion without difficulty or complaint. Encouraged po hydration throughout the day.    Reviewed next apt date/time: 6 Month F/U with MD scheduled in November. AVS provided.       Education Record  Learner:  Patient and Spouse  Disease / Diagnosis: Drug induced osteopenia.   Barriers / Limitations:  None  Method:  Brief focused, Discussion, and Reinforcement  General Topics:  Medication, Precautions, and Side effects and symptom management  Outcome:  Shows understanding        
Naty ORTEGA

## 2024-05-22 NOTE — PROGRESS NOTES
Hematology Oncology Progress Note    Patient Name: Maddy Rose   YOB: 1945   Medical Record Number: [id number]     Chief Complaint:  Breast cancer, AI monitoring     Date of Visit: 24      Oncologic History:  78 year old with screen detected right breast cancer s/p right lumpectomy: pT1cN0, g2, ER/PA 98%, HER2 neg, Ki 10%. Radiation omission. arimidex started 10/2022      Interval History:  Here for 6 months follow up and AI management.   Compliant with arimidex. Tolerating it well. No SE. Doing well overall. Still some discomfort to surg site at times. No changes in breast exam. No other issues.      PMH/PSH: Breast ca, asthma, renal caculi, HTN, OA  Family History: Sister  of bladder cancer, Niece with breast ca in her 40s  Social History: Does all her ADLs, helps take care of her  who is in a WC.    Current Outpatient Medications:     triamcinolone 0.1 % External Cream, Apply topically 2 (two) times daily as needed., Disp: 60 g, Rfl: 0    amLODIPine 5 MG Oral Tab, Take 1 tablet (5 mg total) by mouth 2 (two) times daily., Disp: 180 tablet, Rfl: 1    fluticasone furoate-vilanterol (BREO ELLIPTA) 200-25 MCG/ACT Inhalation Aerosol Powder, Breath Activated, Inhale 1 puff into the lungs daily., Disp: 3 each, Rfl: 3    losartan 100 MG Oral Tab, Take 1 tablet (100 mg total) by mouth daily., Disp: 90 tablet, Rfl: 3    anastrozole 1 MG Oral Tab tab, Take 1 tablet (1 mg total) by mouth daily., Disp: 90 tablet, Rfl: 3    pantoprazole 40 MG Oral Tab EC, Take 1 tablet (40 mg total) by mouth before breakfast., Disp: 90 tablet, Rfl: 3    prednisoLONE 1 % Ophthalmic Suspension, INSTILL 1 DROP IN LEFT EYE FOUR TIMES A DAY START EYE DROPS AFTER SURGERY, USE FOR ONE MONTH, Disp: , Rfl:     rosuvastatin 10 MG Oral Tab, Take 1 tablet (10 mg total) by mouth nightly., Disp: 90 tablet, Rfl: 3    Multiple Vitamins-Minerals (PRESERVISION AREDS 2) Oral Cap, Take 2 tablets by mouth daily., Disp: ,  Rfl:     Levalbuterol Tartrate 45 MCG/ACT Inhalation Aerosol, Inhale 1 puff into the lungs every 4 (four) hours as needed for Wheezing., Disp: 3 Inhaler, Rfl: 2    fexofenadine 180 MG Oral Tab, Take 1 tablet (180 mg total) by mouth daily., Disp: , Rfl:     Cholecalciferol (VITAMIN D) 1000 UNITS Oral Cap, Take 1 tablet by mouth daily.  , Disp: , Rfl:     Biotin 1000 MCG Oral Tab, Take 1,000 mcg by mouth daily.  , Disp: , Rfl:     Calcium 500-125 MG-UNIT Oral Tab, Take 150 mg by mouth 2 (two) times daily., Disp: , Rfl:     Misc Natural Products (OSTEO BI-FLEX ADV DOUBLE ST OR), Take by mouth 2 (two) times daily., Disp: , Rfl:     Multiple Vitamin Oral Tab, Take 1 tablet by mouth 3 (three) times a week., Disp: , Rfl:     Review of Systems: Oncology specific ROS negative except as per HPI    Vitals:  /50 (BP Location: Right arm, Patient Position: Sitting, Cuff Size: adult)   Pulse 64   Temp 98.1 °F (36.7 °C) (Oral)   Resp 16   Ht 1.499 m (4' 11.02\")   Wt 67.6 kg (149 lb)   LMP  (LMP Unknown)   SpO2 97%   BMI 30.08 kg/m²     Physical Exam:  General: Patient is alert and oriented x 3, not in acute distress.  Chest: Symmetric expansion, nonlabored breathing  Breast: R lumpectomy scar. No palpable masses in either breast. Axilla normal bilaterally.   Extremities: bilateral symmetric leg swelling  Neurological: motor strength grossly intact, MA4E  Psych: appropriate mood and affect      Labs:  Lab Results   Component Value Date    WBC 9.9 04/08/2024    RBC 4.47 04/08/2024    HGB 12.4 04/08/2024    HCT 40.3 04/08/2024    MCV 90.2 04/08/2024    MCH 27.7 04/08/2024    MCHC 30.8 (L) 04/08/2024    RDW 13.8 04/08/2024    .0 04/08/2024    MPV 9.0 11/20/2018     Lab Results   Component Value Date     03/13/2024    K 3.8 03/13/2024     03/13/2024    CO2 27.0 03/13/2024    BUN 12 03/13/2024    CREATSERUM 0.73 05/22/2024     (H) 03/13/2024    CA 9.8 05/22/2024    ALKPHO 70 03/13/2024    ALT 17  03/13/2024    AST 24 03/13/2024    BILT 0.9 03/13/2024    ALB 4.9 (H) 03/13/2024    TP 7.6 03/13/2024      Radiology:  [facility] KIP 2D+3D DIAGNOSTIC DORIS RIGHT (CPT=77065/96013)    Result Date: 4/3/2024  CONCLUSION:     Stable post lumpectomy changes right breast since the 1st post lumpectomy examination April 2023. Recommend six-month follow-up bilateral diagnostic mammogram to assess for continued post lumpectomy stability right breast and for annual examination left breast.   BI-RADS CATEGORY:   DIAGNOSTIC CATEGORY 2--BENIGN FINDING:   RECOMMENDATIONS:  SHORT TERM FOLLOW-UP DIAGNOSTIC MAMMOGRAM BILATERAL BREASTS IN 6 MONTHS.       PLEASE NOTE: NORMAL MAMMOGRAM DOES NOT EXCLUDE THE POSSIBILITY OF BREAST CANCER.  A CLINICALLY SUSPICIOUS PALPABLE LUMP SHOULD BE BIOPSIED.   For patients over the age of 40, the target due date for the patient's next mammogram has been entered into a reminder system.   Patient received a discharge summary from the technologist after completion of exam.  Breast marker legend used on images  Triangle = Palpable lump Mechoopda = Skin tag or mole BB = Nipple Linear vern = Scar Square = Pain    Dictated by (CST): Solomon Beckford MD on 4/03/2024 at 8:17 AM     Finalized by (CST): Solomon Beckford MD on 4/03/2024 at 8:22 AM          XR DEXA BONE DENSITOMETRY (CPT=77080)    Result Date: 3/13/2024  CONCLUSION:  1. Findings in the left femoral neck are in the normal range, and there is no increased fracture risk.  There has been increase in the BMD by 5.3% from previous study.  Findings in the total left hip are in the normal range, and there is no increased fracture risk.  There has been decrease in the BMD by 2.8% from previous study.  The 10 year fracture risk for major osteoporotic fracture is 11%, and for hip fracture is 2.0%. 2. Findings in the total AP lumbar spine are in the normal range, and there is no increased fracture risk.  There has been increase in the BMD by 4.6% from previous study.     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 Radiation omitted, on anastrazole 10/2022.   - tolerating AI well with no side effects.   - right diagnostic mammogram benign with stable post lumpectomy changes. Repeat bilat diag mammogram 10/2024.   - continue AI for total 5 years (10/2027).     Bone health   - AI induced osteopenia  - on zometa q6 months. Dose today.   - repeat DEXA scan.   - continue Ca and Vit D supplements.     Return in about 6 months (around 11/22/2024).       Eugenie Rawls MD  Hematology Oncology

## 2024-07-08 ENCOUNTER — OFFICE VISIT (OUTPATIENT)
Dept: INTERNAL MEDICINE CLINIC | Facility: CLINIC | Age: 79
End: 2024-07-08
Payer: MEDICARE

## 2024-07-08 VITALS
SYSTOLIC BLOOD PRESSURE: 137 MMHG | TEMPERATURE: 98 F | OXYGEN SATURATION: 97 % | HEART RATE: 67 BPM | BODY MASS INDEX: 29.84 KG/M2 | HEIGHT: 59 IN | DIASTOLIC BLOOD PRESSURE: 64 MMHG | WEIGHT: 148 LBS

## 2024-07-08 DIAGNOSIS — I10 PRIMARY HYPERTENSION: Primary | ICD-10-CM

## 2024-07-08 PROCEDURE — 99214 OFFICE O/P EST MOD 30 MIN: CPT | Performed by: INTERNAL MEDICINE

## 2024-07-08 RX ORDER — TRIAMCINOLONE ACETONIDE 1 MG/G
CREAM TOPICAL 2 TIMES DAILY PRN
Qty: 60 G | Refills: 0 | Status: SHIPPED | OUTPATIENT
Start: 2024-07-08

## 2024-07-08 NOTE — PROGRESS NOTES
Subjective:     Patient ID: Maddy Rose is a 79 year old female.    Hypertension  Associated symptoms include neck pain.   Neck Pain         History/Other: She came in today for follow-up on her blood pressure.  She is monitoring blood pressure is well-controlled she is taking her medications regularly.    She also states that on and off she does have this pain on her neck mostly on her left side and with movement of her neck she can feel the crack on her cervical spine.  Review of Systems   Constitutional: Negative.    HENT: Negative.     Eyes: Negative.    Respiratory: Negative.     Cardiovascular: Negative.    Gastrointestinal: Negative.    Genitourinary: Negative.    Musculoskeletal:  Positive for neck pain.   Neurological: Negative.    Hematological: Negative.    Psychiatric/Behavioral: Negative.       Current Outpatient Medications   Medication Sig Dispense Refill    triamcinolone 0.1 % External Cream Apply topically 2 (two) times daily as needed. 60 g 0    amLODIPine 5 MG Oral Tab Take 1 tablet (5 mg total) by mouth 2 (two) times daily. 180 tablet 1    fluticasone furoate-vilanterol (BREO ELLIPTA) 200-25 MCG/ACT Inhalation Aerosol Powder, Breath Activated Inhale 1 puff into the lungs daily. 3 each 3    losartan 100 MG Oral Tab Take 1 tablet (100 mg total) by mouth daily. 90 tablet 3    anastrozole 1 MG Oral Tab tab Take 1 tablet (1 mg total) by mouth daily. 90 tablet 3    pantoprazole 40 MG Oral Tab EC Take 1 tablet (40 mg total) by mouth before breakfast. 90 tablet 3    prednisoLONE 1 % Ophthalmic Suspension INSTILL 1 DROP IN LEFT EYE FOUR TIMES A DAY START EYE DROPS AFTER SURGERY, USE FOR ONE MONTH      rosuvastatin 10 MG Oral Tab Take 1 tablet (10 mg total) by mouth nightly. 90 tablet 3    Multiple Vitamins-Minerals (PRESERVISION AREDS 2) Oral Cap Take 2 tablets by mouth daily.      Levalbuterol Tartrate 45 MCG/ACT Inhalation Aerosol Inhale 1 puff into the lungs every 4 (four) hours as needed for  Wheezing. 3 Inhaler 2    fexofenadine 180 MG Oral Tab Take 1 tablet (180 mg total) by mouth daily.      Cholecalciferol (VITAMIN D) 1000 UNITS Oral Cap Take 1 tablet by mouth daily.        Biotin 1000 MCG Oral Tab Take 1,000 mcg by mouth daily.        Calcium 500-125 MG-UNIT Oral Tab Take 150 mg by mouth 2 (two) times daily.      Misc Natural Products (OSTEO BI-FLEX ADV DOUBLE ST OR) Take by mouth 2 (two) times daily.      Multiple Vitamin Oral Tab Take 1 tablet by mouth 3 (three) times a week.       Allergies:  Allergies   Allergen Reactions    Azithromycin HIVES    Hydrochlorothiazide MYALGIA       Past Medical History:    Asthma (HCC)    Practically whole life    Biliary calculus    Supposedly stone in neck of gallblatter    Breast CA (HCC)    Calculus of kidney    Cancer (HCC)    Breast CA    Colon polyp    Detected and removed during colonoscopy    Essential hypertension    On medication    Extrinsic asthma, unspecified    Hemorrhoids    Detected during recent physical    High blood pressure    High cholesterol    Hx of motion sickness    Can't read in the car    Hyperlipidemia    On medication    Kidney stones    multiple times has had kidney stones    Medicare annual wellness visit, subsequent    Osteoarthritis    Other and unspecified hyperlipidemia    Pneumonia due to organism    Pseudocholinesterase deficiency    Patient denies, and doesn't recall any prior incidences and states MD never mentioned it to her.    Unspecified essential hypertension    Visual impairment    Glasses      Past Surgical History:   Procedure Laterality Date    Colonoscopy  Approx 2009    Colonoscopy N/A 1/17/2018    Procedure: COLONOSCOPY;  Surgeon: Aaron Cordova MD;  Location: [facility]    Colonoscopy N/A 12/18/2019    Procedure: COLONOSCOPY;  Surgeon: Aaron Cordova MD;  [facility]    Egd  2009    Lumpectomy right Right 09/19/2022    Right DORIS LOC    Tubal ligation  01/01/1982      Family History    Problem Relation Age of Onset    Breast Cancer Self 77    Dementia Mother     Heart Disorder Father 48        mi    High Cholesterol Father     Other (Other) Father     Cancer Sister          2016 Bladder Cancer    Arthritis Sister     Cancer Brother 70        colon    Heart Disorder Brother 57        cabg    Cancer Brother         Skin Cancer    Prostate Cancer Brother 60    Heart Disorder Paternal Grandmother     Other (stroke) Paternal Grandmother 82    Heart Disorder Paternal Grandfather 47        mi    Breast Cancer Niece         40-50's brother's daughter    Breast Cancer Other         mat great grandmother       Social History:   Social History     Socioeconomic History    Marital status:    Tobacco Use    Smoking status: Former     Current packs/day: 0.00     Average packs/day: 3.0 packs/day for 4.0 years (12.0 ttl pk-yrs)     Types: Cigarettes     Start date: 1963     Quit date: 1967     Years since quittin.5    Smokeless tobacco: Never   Vaping Use    Vaping status: Never Used   Substance and Sexual Activity    Alcohol use: Not Currently     Alcohol/week: 0.0 standard drinks of alcohol    Drug use: Never    Sexual activity: Not Currently     Partners: Male        Objective:   Physical Exam  Vitals and nursing note reviewed.   Constitutional:       Appearance: Normal appearance.   HENT:      Head: Normocephalic and atraumatic.   Cardiovascular:      Rate and Rhythm: Normal rate and regular rhythm.      Pulses: Normal pulses.      Heart sounds: Normal heart sounds.   Pulmonary:      Effort: Pulmonary effort is normal.      Breath sounds: Normal breath sounds.   Abdominal:      Palpations: Abdomen is soft.   Musculoskeletal:      Cervical back: Normal range of motion and neck supple. Spasms and crepitus present. No swelling, deformity, signs of trauma or tenderness. Normal range of motion.   Skin:     General: Skin is warm.   Neurological:      Mental Status: She is alert.          Assessment & Plan:   No diagnosis found.  Essential hypertension blood pressure controlled continue with current medication    Neck pain most likely osteoarthritis discussed about neck exercises, instruction given, can take Tylenol as needed,  No orders of the defined types were placed in this encounter.      Meds This Visit:  Requested Prescriptions     Signed Prescriptions Disp Refills    triamcinolone 0.1 % External Cream 60 g 0     Sig: Apply topically 2 (two) times daily as needed.       Imaging & Referrals:  None

## 2024-09-29 DIAGNOSIS — E78.5 HYPERLIPIDEMIA, UNSPECIFIED HYPERLIPIDEMIA TYPE: ICD-10-CM

## 2024-10-01 RX ORDER — ROSUVASTATIN CALCIUM 10 MG/1
10 TABLET, COATED ORAL NIGHTLY
Qty: 90 TABLET | Refills: 3 | Status: SHIPPED | OUTPATIENT
Start: 2024-10-01

## 2024-10-02 ENCOUNTER — TELEPHONE (OUTPATIENT)
Facility: CLINIC | Age: 79
End: 2024-10-02

## 2024-10-04 NOTE — TELEPHONE ENCOUNTER
Dr Cordova,    Per your last recommendation:    Aaron Cordova MD  12/29/2019  8:36 PM CST       See today's email sent to patient.  No colonoscopy recall due to age 74, minimal findings on this month's exam     Pt called to schedule next colonoscopy. I was unable to speak to her over the phone. Should we offer a colonoscopy to this patient if she wants one?    ==========================      Aaron Cordova MD   Physician  Gastroenterology     Operative Report  Signed     Date of Service: 12/18/2019  1:09 PM  Case Time: Procedures: Surgeons:   12/18/2019  1:01 PM COLONOSCOPY   ESOPHAGOGASTRODUODENOSCOPY (EGD)    Aaron Cordova MD               Signed         EGD AND COLONOSCOPY PROCEDURE REPORTS:     DATE OF PROCEDURE:  12/18/2019     PCP: Renita Garcia MD     PREOPERATIVE DIAGNOSIS:  Dyspepsia, abnormal imaging study of the stomach, change in bowel habits, history of colon polyps     POSTOPERATIVE DIAGNOSIS:  See impression.     SURGEON:  Aaron Cordova M.D.     SEDATION:    MAC anesthesia provided by the Anesthesia Service.  MAC anesthesia requested due to anticipated intolerance of the EGD and colonoscopy examination under safe doses conscious sedation medications     EGD PROCEDURE:    After the nature and risks of EGD and colonoscopy examinations under MAC anesthesia were discussed with the patient and all questions answered, informed consent was obtained.  The patient was sedated as above.       Once sedated, the Olympus adult diagnostic gastroscope was placed in the patient's mouth and advanced under direct visualization through the oropharynx into the esophagus, on down through the stomach and pylorus to the duodenal bulb and second portions of the duodenum.  Retroflexion was performed in the stomach.     EGD FINDINGS:    Esophagus and GE junction: Mild reflux esophagitis changes at the GE junction with some edema of the top of the gastric folds.  Mildly irregular Z  line.  No abnormal mucosa no mucosal erosions.  Photograph taken.     Stomach: Clear secretions present.  No lesion to correlate with the gastric fundus nodule on CT scan of April 2019.  Healthy normal-appearing gastric mucosa throughout.  At least 4 sessile 4-6mm  spongy glandular appearing polyps removed today by cold snare polypectomy, suctioned out.     Duodenum: Clear secretions present.  Normal duodenal bulb and second portion.     COLONOSCOPY PROCEDURE:    The patient was repositioned for colonoscopy examination.  Additional sedation given.  Digital rectal exam was performed, which showed no masses.  The Olympus pediatric video colonoscope was advanced under direct visualization through the entire length of the colon to the cecum and terminal ileum.  Retroflex exam performed up the ascending colon.  Cecum was confirmed by landmarks, including appendiceal orifice, cecal trifold, ileocecal valve.  Retroflexion was performed in the rectum.     The quality of the prep was good.     COLONOSCOPY FINDINGS:  Moderate severity pancolonic diverticulosis.  Sessile 4 mm polyp removed from the mid transverse colon by cold snare polypectomy, suctioned out.  Sessile 4 mm polyp removed from the mid sigmoid colon by cold snare polypectomy, suctioned out.  Small internal hemorrhoids.     IMPRESSION:  Reassuring EGD examination with small likely gastric glandular polyps only.  2 diminutive colon polyps removed today as above.  Moderate severity pancolonic diverticulosis.  Recent development of colon cancer in a sibling.  Reassuring colonoscopy examinations today and last year 2018.     RECOMMENDATIONS:  Follow-up above gastric polyp pathology results.  Suspect benign gastric fundic gland polyps.  Follow-up above colon polyp pathology results.  Daily bowel regimen as needed.  No routine repeat colonoscopy examination at this point unless further symptoms or imaging abnormalities.            Electronically signed by Tasha  Aaron Kingston MD at 12/18/2019  1:45 PM  ==========================  Final Diagnosis:      A. Gastric polyp:   Fragments of fundic gland polyp.     B. Sigmoid colon polyp:  Tubular adenoma.     C. Transverse colon polyp:   Hyperplastic polyp.

## 2024-10-05 NOTE — TELEPHONE ENCOUNTER
Recent office visits PCP Janey Martines MD 7/8/2024, 4/17/2024, 1/22/2024 reviewed.  History of hypertension and neck problems; apparently asymptomatic aortic stenosis; history of R breast lumpectomy for breast cancer September 2022.  Medications reviewed.    My previous EGD and colonoscopy examination report 12/18/2019 reviewed.  Less than 5 years ago, reassuring EGD examination and colonoscopy examination showing 2 diminutive colon polyps.  1 polyp was hyperplastic the other adenoma.    GI RNs, please advise Ms. Rose that I do not believe another colonoscopy examination is necessary.  It is optional.  She appears to be very worried about this concern.  I do see that she has family history of a sibling with colon cancer.  Nonetheless, I believe that the risks of the prep, MAC anesthesia, an invasive procedure outweigh the benefits of another colonoscopy examination as her last 2 colonoscopy examinations showed almost nothing.    However she still wishes to schedule I can put in orders.    - cb

## 2024-10-07 ENCOUNTER — HOSPITAL ENCOUNTER (OUTPATIENT)
Dept: MAMMOGRAPHY | Facility: HOSPITAL | Age: 79
Discharge: HOME | End: 2024-10-07
Attending: INTERNAL MEDICINE
Payer: MEDICARE

## 2024-10-07 DIAGNOSIS — C50.311 MALIGNANT NEOPLASM OF LOWER-INNER QUADRANT OF RIGHT BREAST OF FEMALE, ESTROGEN RECEPTOR POSITIVE (HCC): Primary | ICD-10-CM

## 2024-10-07 DIAGNOSIS — Z17.0 MALIGNANT NEOPLASM OF LOWER-INNER QUADRANT OF RIGHT BREAST OF FEMALE, ESTROGEN RECEPTOR POSITIVE (HCC): Primary | ICD-10-CM

## 2024-10-07 DIAGNOSIS — Z17.0 MALIGNANT NEOPLASM OF LOWER-INNER QUADRANT OF RIGHT BREAST OF FEMALE, ESTROGEN RECEPTOR POSITIVE (HCC): ICD-10-CM

## 2024-10-07 DIAGNOSIS — C50.311 MALIGNANT NEOPLASM OF LOWER-INNER QUADRANT OF RIGHT BREAST OF FEMALE, ESTROGEN RECEPTOR POSITIVE (HCC): ICD-10-CM

## 2024-10-07 PROCEDURE — 77066 DX MAMMO INCL CAD BI: CPT | Performed by: INTERNAL MEDICINE

## 2024-10-07 PROCEDURE — 77062 BREAST TOMOSYNTHESIS BI: CPT | Performed by: INTERNAL MEDICINE

## 2024-10-07 NOTE — TELEPHONE ENCOUNTER
Dr Martines,    I spoke to the pt    I advised her of Dr Cordova's recommendations below    Pt prefers to not have a colonoscopy at this time    She is not experiencing any change of bowel habits, no blood in her stool, no unexplained weight loss, fatigue, weakness, stomach pain, decreased appetite, etc.    I let the pt know if she experiences any of these symptoms in the future she can call us back.   For now she is following Dr Cordova's advise to not repeat a colonoscopy at this time     Pt states you recommended she repeat a colonoscopy as it looks as it is due on Health Maintenance  I let the pt know I would update you.    I excluded the colonoscopy from Health Maintenance

## 2024-10-18 RX ORDER — PANTOPRAZOLE SODIUM 40 MG/1
40 TABLET, DELAYED RELEASE ORAL
Qty: 90 TABLET | Refills: 3 | OUTPATIENT
Start: 2024-10-18

## 2024-10-29 DIAGNOSIS — I10 ESSENTIAL HYPERTENSION: ICD-10-CM

## 2024-11-01 RX ORDER — LOSARTAN POTASSIUM 100 MG/1
100 TABLET ORAL DAILY
Qty: 90 TABLET | Refills: 3 | Status: SHIPPED | OUTPATIENT
Start: 2024-11-01

## 2024-11-01 NOTE — TELEPHONE ENCOUNTER
Refill Per Protocol     Requested Prescriptions   Pending Prescriptions Disp Refills    losartan 100 MG Oral Tab 90 tablet 3     Sig: Take 1 tablet (100 mg total) by mouth daily.       Hypertension Medications Protocol Passed - 11/1/2024 12:31 PM        Passed - CMP or BMP in past 12 months        Passed - Last BP reading less than 140/90     BP Readings from Last 1 Encounters:   07/08/24 137/64               Passed - In person appointment or virtual visit in the past 12 mos or appointment in next 3 mos     Recent Outpatient Visits              3 months ago Primary hypertension    [facility] Janey Martines MD    Office Visit    5 months ago Malignant neoplasm of lower-inner quadrant of right breast of female, estrogen receptor positive (HCC)    [facility] Hematology Oncology Eugenie Rawls MD    Office Visit    5 months ago Drug-induced osteoporosis    [facility] - Infusion    Nurse Only    5 months ago Drug-induced osteoporosis    [facility]    Office Visit    6 months ago Eczema, unspecified type    [facility] Janey Martines MD    Office Visit          Future Appointments         Provider Department Appt Notes    In 3 weeks Eugenie Rawls MD [facility] Hematology Oncology FOLLOW UP VISIT    In 2 months Janey Martines MD [facility] annual phx; last phx 1/22/2024  pneumonia vaccine    In 5 months [address] Mammography - [facility]                     Passed - EGFRCR or GFRNAA > 50     GFR Evaluation  EGFRCR: 84 , resulted on 5/22/2024                 Future Appointments         Provider Department Appt Notes    In 3 weeks Eugenie Rawls MD [facility] Hematology Oncology FOLLOW UP VISIT    In 2 months Janey Martines MD [facility] annual phx; last  phx 1/22/2024  pneumonia vaccine    In 5 months [facility]           Recent Outpatient Visits              3 months ago Primary hypertension    [facility] Janey Campos MD    Office Visit    5 months ago Malignant neoplasm of lower-inner quadrant of right breast of female, estrogen receptor positive (HCC)    [facility] Hematology Oncology Eugenie Rawls MD    Office Visit    5 months ago Drug-induced osteoporosis    [facility] - Infusion    Nurse Only    5 months ago Drug-induced osteoporosis    [facility]    Office Visit    6 months ago Eczema, unspecified type    [facility], Janey Campos MD    Office Visit

## 2024-11-22 ENCOUNTER — APPOINTMENT (OUTPATIENT)
Dept: HEMATOLOGY/ONCOLOGY | Facility: HOSPITAL | Age: 79
End: 2024-11-22
Attending: NURSE PRACTITIONER
Payer: MEDICARE

## 2024-11-22 ENCOUNTER — OFFICE VISIT (OUTPATIENT)
Dept: HEMATOLOGY/ONCOLOGY | Facility: HOSPITAL | Age: 79
End: 2024-11-22
Attending: INTERNAL MEDICINE
Payer: MEDICARE

## 2024-11-22 VITALS
RESPIRATION RATE: 16 BRPM | HEART RATE: 84 BPM | TEMPERATURE: 98 F | HEIGHT: 59.02 IN | OXYGEN SATURATION: 97 % | SYSTOLIC BLOOD PRESSURE: 145 MMHG | BODY MASS INDEX: 29.84 KG/M2 | WEIGHT: 148 LBS | DIASTOLIC BLOOD PRESSURE: 62 MMHG

## 2024-11-22 DIAGNOSIS — Z51.81 ENCOUNTER FOR MONITORING AROMATASE INHIBITOR THERAPY: ICD-10-CM

## 2024-11-22 DIAGNOSIS — M85.80 OSTEOPENIA DUE TO CANCER THERAPY: ICD-10-CM

## 2024-11-22 DIAGNOSIS — Z79.811 LONG TERM CURRENT USE OF AROMATASE INHIBITOR: ICD-10-CM

## 2024-11-22 DIAGNOSIS — Z17.0 MALIGNANT NEOPLASM OF LOWER-INNER QUADRANT OF RIGHT BREAST OF FEMALE, ESTROGEN RECEPTOR POSITIVE (HCC): Primary | ICD-10-CM

## 2024-11-22 DIAGNOSIS — C50.311 MALIGNANT NEOPLASM OF LOWER-INNER QUADRANT OF RIGHT BREAST OF FEMALE, ESTROGEN RECEPTOR POSITIVE (HCC): Primary | ICD-10-CM

## 2024-11-22 DIAGNOSIS — Z79.811 ENCOUNTER FOR MONITORING AROMATASE INHIBITOR THERAPY: ICD-10-CM

## 2024-11-22 PROCEDURE — G2211 COMPLEX E/M VISIT ADD ON: HCPCS | Performed by: INTERNAL MEDICINE

## 2024-11-22 PROCEDURE — 99214 OFFICE O/P EST MOD 30 MIN: CPT | Performed by: INTERNAL MEDICINE

## 2024-11-22 NOTE — PROGRESS NOTES
Hematology Oncology Progress Note    Patient Name: Maddy Rose   YOB: 1945   Medical Record Number: [id number]   Attending Physician: Eugenie Rawls MD     Date of Visit: 2024       Chief Complaint:  Breast cancer, AI monitoring     Oncologic History:  79 year old with screen detected right breast cancer s/p right lumpectomy: pT1cN0, g2, ER/GA 98%, HER2 neg, Ki 10%. Radiation omission. arimidex started 10/2022      Interval History:  Here for 6 months follow up and review of mammogram. Overall she is doing well and tolerating anastrozole with no issues or side effects. She denies any breast changes or concerns. Denies any palpable masses or enlarged nodes. Denies arthralgia, myalgia, fatigue.       PMH/PSH: Breast ca, asthma, renal caculi, HTN, OA  Family History: Sister  of bladder cancer, Niece with breast ca in her 40s  Social History: Does all her ADLs, helps take care of her  who is in a WC.    Current Outpatient Medications:     losartan 100 MG Oral Tab, Take 1 tablet (100 mg total) by mouth daily., Disp: 90 tablet, Rfl: 3    ROSUVASTATIN 10 MG Oral Tab, TAKE 1 TABLET NIGHTLY, Disp: 90 tablet, Rfl: 3    triamcinolone 0.1 % External Cream, Apply topically 2 (two) times daily as needed., Disp: 60 g, Rfl: 0    amLODIPine 5 MG Oral Tab, Take 1 tablet (5 mg total) by mouth 2 (two) times daily., Disp: 180 tablet, Rfl: 1    fluticasone furoate-vilanterol (BREO ELLIPTA) 200-25 MCG/ACT Inhalation Aerosol Powder, Breath Activated, Inhale 1 puff into the lungs daily., Disp: 3 each, Rfl: 3    anastrozole 1 MG Oral Tab tab, Take 1 tablet (1 mg total) by mouth daily., Disp: 90 tablet, Rfl: 3    pantoprazole 40 MG Oral Tab EC, Take 1 tablet (40 mg total) by mouth before breakfast., Disp: 90 tablet, Rfl: 3    prednisoLONE 1 % Ophthalmic Suspension, INSTILL 1 DROP IN LEFT EYE FOUR TIMES A DAY START EYE DROPS AFTER SURGERY, USE FOR ONE MONTH, Disp: , Rfl:     Multiple Vitamins-Minerals  (PRESERVISION AREDS 2) Oral Cap, Take 2 tablets by mouth daily., Disp: , Rfl:     Levalbuterol Tartrate 45 MCG/ACT Inhalation Aerosol, Inhale 1 puff into the lungs every 4 (four) hours as needed for Wheezing., Disp: 3 Inhaler, Rfl: 2    fexofenadine 180 MG Oral Tab, Take 1 tablet (180 mg total) by mouth daily., Disp: , Rfl:     Cholecalciferol (VITAMIN D) 1000 UNITS Oral Cap, Take 1 tablet by mouth daily.  , Disp: , Rfl:     Biotin 1000 MCG Oral Tab, Take 1,000 mcg by mouth daily.  , Disp: , Rfl:     Calcium 500-125 MG-UNIT Oral Tab, Take 150 mg by mouth 2 (two) times daily., Disp: , Rfl:     Misc Natural Products (OSTEO BI-FLEX ADV DOUBLE ST OR), Take by mouth 2 (two) times daily., Disp: , Rfl:     Multiple Vitamin Oral Tab, Take 1 tablet by mouth 3 (three) times a week., Disp: , Rfl:     Review of Systems: Oncology specific ROS negative except as per HPI    Vitals:  /62 (BP Location: Right arm, Patient Position: Sitting, Cuff Size: adult)   Pulse 84   Temp 97.6 °F (36.4 °C) (Oral)   Resp 16   Ht 1.499 m (4' 11.02\")   Wt 67.1 kg (148 lb)   LMP  (LMP Unknown)   SpO2 97%   BMI 29.87 kg/m²     Physical Exam:  General: Patient is alert and oriented x 3, not in acute distress.  Chest: Symmetric expansion, nonlabored breathing  Breast: R lumpectomy scar. No palpable masses in either breast. Axilla normal bilaterally.   Extremities: bilateral symmetric leg swelling  Neurological: motor strength grossly intact, MA4E  Psych: appropriate mood and affect      Labs:  Lab Results   Component Value Date    WBC 9.9 04/08/2024    RBC 4.47 04/08/2024    HGB 12.4 04/08/2024    HCT 40.3 04/08/2024    MCV 90.2 04/08/2024    MCH 27.7 04/08/2024    MCHC 30.8 (L) 04/08/2024    RDW 13.8 04/08/2024    .0 04/08/2024    MPV 9.0 11/20/2018     Lab Results   Component Value Date     03/13/2024    K 3.8 03/13/2024     03/13/2024    CO2 27.0 03/13/2024    BUN 12 03/13/2024    CREATSERUM 0.73 05/22/2024      (H) 03/13/2024    CA 9.8 05/22/2024    ALKPHO 70 03/13/2024    ALT 17 03/13/2024    AST 24 03/13/2024    BILT 0.9 03/13/2024    ALB 4.9 (H) 03/13/2024    TP 7.6 03/13/2024      Radiology:  [facility] KIP 2D+3D DIAGNOSTIC [facility]  BILAT (CPT=77066/[id number])    Result Date: 10/7/2024  CONCLUSION:     Postlumpectomy changes in the right breast.  No mammographic evidence of breast malignancy.  Six-month follow-up right diagnostic mammogram is recommended, per lumpectomy protocol.  BI-RADS CATEGORY:   DIAGNOSTIC CATEGORY 2 - BENIGN FINDING:   RECOMMENDATIONS:  SHORT TERM FOLLOW-UP DIAGNOSTIC MAMMOGRAM RIGHT BREAST IN 6 MONTHS.       PLEASE NOTE: NORMAL MAMMOGRAM DOES NOT EXCLUDE THE POSSIBILITY OF BREAST CANCER.  A CLINICALLY SUSPICIOUS PALPABLE LUMP SHOULD BE BIOPSIED.   For patients over the age of 40, the target due date for the patient's next mammogram has been entered into a reminder system.   Patient received a discharge summary from the technologist after completion of exam.  Breast marker legend used on images  Triangle = Palpable lump Dickerson = Skin tag or mole BB = Nipple Linear vern = Scar Square = Pain    Dictated by (CST): Lavern Cano MD on 10/07/2024 at 9:28 AM     Finalized by (CST): Lavern Cano MD on 10/07/2024 at 9:37 AM      [address], [place] [address] [phone]         Cancer Staging   Breast cancer, stage 1, estrogen receptor positive, right (HCC)  Staging form: Breast, AJCC 8th Edition  - Pathologic: Stage IA (pT1c, pN0, cM0, G2, ER+, AZ+, HER2-) - Signed by Zuleima Garcia MD on 10/6/2022      Impression and Plan:    Right breast invasive ductal carcinoma, grade 2, 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 Radiation omitted.  - started anastrozole 10/2022 and tolerating well with no side effects. Plan to complete 5 yrs (10/2027).   - bilat diag mammogram 10/2024 benign. Repeat right diagnostic mammogram in 4/2025.   - no  clinical or radiographic evidence of disease recurrence. Continue surveillance and adjuvant AI     Bone health   - osteopenia secondary to age and AI. received zometa q6 months x4 doses   - repeat DEXA scan in 3/2024 showed normal bone density; zometa discontinued   - continue Ca and Vit D supplements.       Return in about 6 months (around 5/22/2025).       Eugenie Rawls MD  Hematology Oncology

## 2024-12-03 ENCOUNTER — NURSE ONLY (OUTPATIENT)
Dept: LAB | Facility: HOSPITAL | Age: 79
End: 2024-12-03
Attending: INTERNAL MEDICINE
Payer: MEDICARE

## 2024-12-03 DIAGNOSIS — I10 HTN (HYPERTENSION): ICD-10-CM

## 2024-12-03 DIAGNOSIS — I11.9 HYPERTENSIVE CARDIOVASCULAR DISEASE: ICD-10-CM

## 2024-12-03 DIAGNOSIS — E78.5 HYPERLIPIDEMIA: Primary | ICD-10-CM

## 2024-12-03 LAB
ALBUMIN SERPL-MCNC: 4.9 G/DL (ref 3.2–4.8)
ALBUMIN/GLOB SERPL: 1.8 {RATIO} (ref 1–2)
ALP LIVER SERPL-CCNC: 66 U/L
ALT SERPL-CCNC: 22 U/L
ANION GAP SERPL CALC-SCNC: 8 MMOL/L (ref 0–18)
AST SERPL-CCNC: 21 U/L (ref ?–34)
BASOPHILS # BLD AUTO: 0.06 X10(3) UL (ref 0–0.2)
BASOPHILS NFR BLD AUTO: 0.9 %
BILIRUB SERPL-MCNC: 0.5 MG/DL (ref 0.2–1.1)
BUN BLD-MCNC: 11 MG/DL (ref 9–23)
BUN/CREAT SERPL: 14.7 (ref 10–20)
CALCIUM BLD-MCNC: 9.3 MG/DL (ref 8.7–10.4)
CHLORIDE SERPL-SCNC: 108 MMOL/L (ref 98–112)
CHOLEST SERPL-MCNC: 154 MG/DL (ref ?–200)
CO2 SERPL-SCNC: 25 MMOL/L (ref 21–32)
CREAT BLD-MCNC: 0.75 MG/DL
DEPRECATED RDW RBC AUTO: 46 FL (ref 35.1–46.3)
EGFRCR SERPLBLD CKD-EPI 2021: 80 ML/MIN/1.73M2 (ref 60–?)
EOSINOPHIL # BLD AUTO: 0.14 X10(3) UL (ref 0–0.7)
EOSINOPHIL NFR BLD AUTO: 2 %
ERYTHROCYTE [DISTWIDTH] IN BLOOD BY AUTOMATED COUNT: 14.2 % (ref 11–15)
FASTING PATIENT LIPID ANSWER: NO
FASTING STATUS PATIENT QL REPORTED: NO
GLOBULIN PLAS-MCNC: 2.7 G/DL (ref 2–3.5)
GLUCOSE BLD-MCNC: 134 MG/DL (ref 70–99)
HCT VFR BLD AUTO: 39.1 %
HDLC SERPL-MCNC: 66 MG/DL (ref 40–59)
HGB BLD-MCNC: 12.5 G/DL
IMM GRANULOCYTES # BLD AUTO: 0.02 X10(3) UL (ref 0–1)
IMM GRANULOCYTES NFR BLD: 0.3 %
LDLC SERPL CALC-MCNC: 74 MG/DL (ref ?–100)
LYMPHOCYTES # BLD AUTO: 1.75 X10(3) UL (ref 1–4)
LYMPHOCYTES NFR BLD AUTO: 25.4 %
MCH RBC QN AUTO: 28.3 PG (ref 26–34)
MCHC RBC AUTO-ENTMCNC: 32 G/DL (ref 31–37)
MCV RBC AUTO: 88.5 FL
MONOCYTES # BLD AUTO: 0.74 X10(3) UL (ref 0.1–1)
MONOCYTES NFR BLD AUTO: 10.7 %
NEUTROPHILS # BLD AUTO: 4.19 X10 (3) UL (ref 1.5–7.7)
NEUTROPHILS # BLD AUTO: 4.19 X10(3) UL (ref 1.5–7.7)
NEUTROPHILS NFR BLD AUTO: 60.7 %
NONHDLC SERPL-MCNC: 88 MG/DL (ref ?–130)
OSMOLALITY SERPL CALC.SUM OF ELEC: 293 MOSM/KG (ref 275–295)
PLATELET # BLD AUTO: 262 10(3)UL (ref 150–450)
POTASSIUM SERPL-SCNC: 3.5 MMOL/L (ref 3.5–5.1)
PROT SERPL-MCNC: 7.6 G/DL (ref 5.7–8.2)
RBC # BLD AUTO: 4.42 X10(6)UL
SODIUM SERPL-SCNC: 141 MMOL/L (ref 136–145)
TRIGL SERPL-MCNC: 75 MG/DL (ref 30–149)
VLDLC SERPL CALC-MCNC: 11 MG/DL (ref 0–30)
WBC # BLD AUTO: 6.9 X10(3) UL (ref 4–11)

## 2024-12-03 PROCEDURE — 80053 COMPREHEN METABOLIC PANEL: CPT

## 2024-12-03 PROCEDURE — 80061 LIPID PANEL: CPT

## 2024-12-03 PROCEDURE — 36415 COLL VENOUS BLD VENIPUNCTURE: CPT

## 2024-12-03 PROCEDURE — 85025 COMPLETE CBC W/AUTO DIFF WBC: CPT

## 2025-01-06 ENCOUNTER — OFFICE VISIT (OUTPATIENT)
Dept: INTERNAL MEDICINE CLINIC | Facility: CLINIC | Age: 80
End: 2025-01-06

## 2025-01-06 VITALS
DIASTOLIC BLOOD PRESSURE: 85 MMHG | HEART RATE: 66 BPM | WEIGHT: 152 LBS | OXYGEN SATURATION: 92 % | BODY MASS INDEX: 30.64 KG/M2 | SYSTOLIC BLOOD PRESSURE: 135 MMHG | HEIGHT: 59 IN | TEMPERATURE: 98 F

## 2025-01-06 DIAGNOSIS — J44.89 ASTHMATIC BRONCHITIS , CHRONIC (HCC): ICD-10-CM

## 2025-01-06 DIAGNOSIS — Z00.00 ENCOUNTER FOR ANNUAL HEALTH EXAMINATION: ICD-10-CM

## 2025-01-06 DIAGNOSIS — R41.3 MEMORY CHANGE: ICD-10-CM

## 2025-01-06 DIAGNOSIS — H35.3231 EXUDATIVE AGE-RELATED MACULAR DEGENERATION, BILATERAL, WITH ACTIVE CHOROIDAL NEOVASCULARIZATION (HCC): ICD-10-CM

## 2025-01-06 DIAGNOSIS — Z17.0 MALIGNANT NEOPLASM OF LOWER-INNER QUADRANT OF RIGHT BREAST OF FEMALE, ESTROGEN RECEPTOR POSITIVE (HCC): ICD-10-CM

## 2025-01-06 DIAGNOSIS — E55.9 VITAMIN D DEFICIENCY: ICD-10-CM

## 2025-01-06 DIAGNOSIS — C50.311 MALIGNANT NEOPLASM OF LOWER-INNER QUADRANT OF RIGHT BREAST OF FEMALE, ESTROGEN RECEPTOR POSITIVE (HCC): ICD-10-CM

## 2025-01-06 DIAGNOSIS — Z00.00 MEDICARE ANNUAL WELLNESS VISIT, SUBSEQUENT: Primary | ICD-10-CM

## 2025-01-06 PROBLEM — I20.0 UNSTABLE ANGINA PECTORIS (HCC): Status: RESOLVED | Noted: 2022-08-26 | Resolved: 2025-01-06

## 2025-01-06 RX ORDER — IPRATROPIUM BROMIDE AND ALBUTEROL SULFATE 2.5; .5 MG/3ML; MG/3ML
SOLUTION RESPIRATORY (INHALATION)
COMMUNITY
Start: 2022-08-26

## 2025-01-06 RX ORDER — DONEPEZIL HYDROCHLORIDE 5 MG/1
5 TABLET, FILM COATED ORAL NIGHTLY
Qty: 90 TABLET | Refills: 0 | Status: SHIPPED | OUTPATIENT
Start: 2025-01-06

## 2025-01-06 NOTE — PROGRESS NOTES
Subjective:   Maddy Rose is a 79 year old female who presents for a Medicare Wellness Visit charge within the last 11 months and Patient may not meet criteria for AWV: Please evaluate for correct coding and scheduled follow up of multiple significant but stable problems.       History/Other:   Fall Risk Assessment:   She has been screened for Falls and is low risk.      Cognitive Assessment:   Abnormal  What day of the week is this?: Incorrect  What month is it?: Correct  What year is it?: Correct  Recall \"Ball\": Incorrect  Recall \"Flag\": Incorrect  Recall \"Tree\": Incorrect    Functional Ability/Status:   Maddy Rose has some abnormal functions as listed below:  She has Hearing problems based on screening of functional status.She has Vision problems based on screening of functional status. She has problems with Memory based on screening of functional status.       Depression Screening (PHQ):  PHQ-2 SCORE: 1  , done 1/6/2025   Feeling down, depressed, or hopeless: 1    Last [place] Suicide Screening on 1/6/2025 was No Risk.     5 minutes spent screening and counseling for depression    Advanced Directives:   She does have a Living Will but we do NOT have it on file in Epic.    She does have a POA but we do NOT have it on file in Epic.    Discussed Advance Care Planning with patient (and family/surrogate if present). Standard forms made available to patient in After Visit Summary.      Patient Active Problem List   Diagnosis    Hypertension    Hyperlipidemia    Asthma with allergic rhinitis (HCC)    Menopause    Atherosclerosis of coronary artery    Aortic stenosis    Dense breast tissue on mammogram    Calculus of gallbladder without cholecystitis without obstruction    Mixed conductive and sensorineural hearing loss of both ears    Diverticulosis of large intestine    Atherosclerosis of abdominal aorta (HCC)    Stenosis of celiac artery (HCC)    Left ventricular hypertrophy due to hypertensive  disease, without heart failure    Senile purpura (HCC)    Bruit of left carotid artery    Other emphysema (HCC)    Asthmatic bronchitis , chronic (HCC)    Drug-induced osteoporosis    Breast cancer, stage 1, estrogen receptor positive, right (HCC)    Use of anastrozole    Unstable angina pectoris (HCC)    Aortic stenosis, mild    Exudative age-related macular degeneration, bilateral, with active choroidal neovascularization (HCC)     Allergies:  She is allergic to azithromycin and hydrochlorothiazide.    Current Medications:  Outpatient Medications Marked as Taking for the 1/6/25 encounter (Office Visit) with Janey Martines MD   Medication Sig    ipratropium-albuterol 0.5-2.5 (3) MG/3ML Inhalation Solution ipratropium-albuterol (DUONEB) 0.5-2.5 (3) MG/3ML Inhalation Solution, [RxNorm: 9450778]    donepezil 5 MG Oral Tab Take 1 tablet (5 mg total) by mouth nightly.    losartan 100 MG Oral Tab Take 1 tablet (100 mg total) by mouth daily.    ROSUVASTATIN 10 MG Oral Tab TAKE 1 TABLET NIGHTLY    triamcinolone 0.1 % External Cream Apply topically 2 (two) times daily as needed.    amLODIPine 5 MG Oral Tab Take 1 tablet (5 mg total) by mouth 2 (two) times daily.    fluticasone furoate-vilanterol (BREO ELLIPTA) 200-25 MCG/ACT Inhalation Aerosol Powder, Breath Activated Inhale 1 puff into the lungs daily.    anastrozole 1 MG Oral Tab tab Take 1 tablet (1 mg total) by mouth daily.    pantoprazole 40 MG Oral Tab EC Take 1 tablet (40 mg total) by mouth before breakfast.    prednisoLONE 1 % Ophthalmic Suspension INSTILL 1 DROP IN LEFT EYE FOUR TIMES A DAY START EYE DROPS AFTER SURGERY, USE FOR ONE MONTH    Multiple Vitamins-Minerals (PRESERVISION AREDS 2) Oral Cap Take 2 tablets by mouth daily.    Levalbuterol Tartrate 45 MCG/ACT Inhalation Aerosol Inhale 1 puff into the lungs every 4 (four) hours as needed for Wheezing.    fexofenadine 180 MG Oral Tab Take 1 tablet (180 mg total) by mouth daily.    Cholecalciferol (VITAMIN D)  1000 UNITS Oral Cap Take 1 tablet by mouth daily.      Biotin 1000 MCG Oral Tab Take 1,000 mcg by mouth daily.      Calcium 500-125 MG-UNIT Oral Tab Take 150 mg by mouth 2 (two) times daily.    Misc Natural Products (OSTEO BI-FLEX ADV DOUBLE ST OR) Take by mouth 2 (two) times daily.    Multiple Vitamin Oral Tab Take 1 tablet by mouth 3 (three) times a week.       Medical History:  She  has a past medical history of Asthma (HCC) (Not sure), Biliary calculus (Was recently detected), Breast CA (HCC), Calculus of kidney, Cancer (HCC), Colon polyp (Approx. 2009), Essential hypertension (2005?), Extrinsic asthma, unspecified, Hemorrhoids (2017), High blood pressure, High cholesterol, motion sickness, Hyperlipidemia (2001?), Kidney stones, Medicare annual wellness visit, subsequent (08/01/2017), Osteoarthritis, Other and unspecified hyperlipidemia, Pneumonia due to organism, Pseudocholinesterase deficiency, Unspecified essential hypertension, and Visual impairment.  Surgical History:  She  has a past surgical history that includes tubal ligation (01/01/1982); colonoscopy (Approx 2009); egd (2009); colonoscopy (N/A, 01/17/2018); colonoscopy (N/A, 12/18/2019); lumpectomy right (Right, 09/19/2022); and cataract (08/11/2004).   Family History:  Her family history includes Arthritis in her sister; Breast Cancer in her niece and another family member; Breast Cancer (age of onset: 78) in her self; Cancer in her brother and sister; Cancer (age of onset: 70) in her brother; Dementia in her mother; Heart Disorder in her paternal grandmother; Heart Disorder (age of onset: 47) in her paternal grandfather; Heart Disorder (age of onset: 48) in her father; Heart Disorder (age of onset: 57) in her brother; High Cholesterol in her father; Other in her father; Prostate Cancer (age of onset: 60) in her brother; stroke (age of onset: 82) in her paternal grandmother.  Social History:  She  reports that she quit smoking about 58 years ago. Her  smoking use included cigarettes. She started smoking about 62 years ago. She has a 12 pack-year smoking history. She has never used smokeless tobacco. She reports that she does not currently use alcohol. She reports that she does not use drugs.    Tobacco:  She smoked tobacco in the past but quit greater than 12 months ago.  Social History     Tobacco Use   Smoking Status Former    Current packs/day: 0.00    Average packs/day: 3.0 packs/day for 4.0 years (12.0 ttl pk-yrs)    Types: Cigarettes    Start date: 1963    Quit date: 1967    Years since quittin.0   Smokeless Tobacco Never   Tobacco Comments    Glad I quit        CAGE Alcohol Screen:   CAGE screening score of 0 on 2025, showing low risk of alcohol abuse.      Patient Care Team:  Janey Martines MD as PCP - General (Internal Medicine)  Josh Cesar MD (Interventional, Cardiology)  Lindsay Oates PT as Physical Therapist (Physical Therapy)  Yomi Agudelo MD (Radiation Oncology)    Review of Systems  GENERAL: feels well otherwise  SKIN: denies any unusual skin lesions  EYES: denies blurred vision or double vision  HEENT: denies nasal congestion, sinus pain or ST  LUNGS: denies shortness of breath with exertion  CARDIOVASCULAR: denies chest pain on exertion  GI: denies abdominal pain, denies heartburn  : denies dysuria, vaginal discharge or itching, no complaint of urinary incontinence   MUSCULOSKELETAL: denies back pain  NEURO: denies headaches  PSYCHE: denies depression or anxiety  HEMATOLOGIC: denies hx of anemia  ENDOCRINE: denies thyroid history  ALL/ASTHMA: denies hx of allergy or asthma    Objective:   Physical Exam  General Appearance:  Alert, cooperative, no distress, appears stated age   Head:  Normocephalic, without obvious abnormality, atraumatic   Eyes:  PERRL, conjunctiva/corneas clear, EOM's intact both eyes   Ears:  Normal TM's and external ear canals, both ears   Nose: Nares normal, septum midline,mucosa normal, no  drainage or sinus tenderness   Throat: Lips, mucosa, and tongue normal; teeth and gums normal   Neck: Supple, symmetrical, trachea midline, no adenopathy;  thyroid: not enlarged, symmetric, no tenderness/mass/nodules; no carotid bruit or JVD   Back:   Symmetric, no curvature, ROM normal, no CVA tenderness   Lungs:   Clear to auscultation bilaterally, respirations unlabored   Heart:  Regular rate and rhythm, S1 and S2 normal, no murmur, rub, or gallop   Abdomen:   Soft, non-tender, bowel sounds active all four quadrants,  no masses, no organomegaly   Pelvic: Deferred   Extremities: Extremities normal, atraumatic, no cyanosis or edema   Pulses: 2+ and symmetric   Skin: Skin color, texture, turgor normal, no rashes or lesions   Lymph nodes: Cervical, supraclavicular, and axillary nodes normal   Neurologic: Normal       /85   Pulse 66   Temp 97.5 °F (36.4 °C) (Temporal)   Ht 4' 11\" (1.499 m)   Wt 152 lb (68.9 kg)   LMP  (LMP Unknown)   SpO2 92%   BMI 30.70 kg/m²  Estimated body mass index is 30.7 kg/m² as calculated from the following:    Height as of this encounter: 4' 11\" (1.499 m).    Weight as of this encounter: 152 lb (68.9 kg).    Medicare Hearing Assessment:   Hearing Screening    Screening Method: Questionnaire  I have a problem hearing over the telephone: No I have trouble following the conversations when two or more people are talking at the same time: Sometimes   I have trouble understanding things on the TV: No I have to strain to understand conversations: Sometimes   I have to worry about missing the telephone ring or doorbell: No I have trouble hearing conversations in a noisy background such as a crowded room or restaurant: Sometimes   I get confused about where sounds come from: No I misunderstand some words in a sentence and need to ask people to repeat themselves: Sometimes   I especially have trouble understanding the speech of women and children: No I have trouble understanding the  speaker in a large room such as at a meeting or place of Mosque: No   Many people I talk to seem to mumble (or don't speak clearly): Sometimes People get annoyed because I misunderstand what they say: No   I misunderstand what others are saying and make inappropriate responses: Sometimes I avoid social activities because I cannot hear well and fear I will reply improperly: No   Family members and friends have told me they think I may have hearing loss: Sometimes             Visual Acuity:     Right Eye Chart Acuity: 20/40     Left Eye Chart Acuity: 20/40     Both Eyes Chart Acuity: 20/40            Assessment & Plan:   Maddy Rose is a 79 year old female who presents for a Medicare Assessment.     1. Medicare annual wellness visit, subsequent (Primary)  -     CBC With Differential With Platelet; Future; Expected date: 01/06/2025  -     Comp Metabolic Panel (14); Future; Expected date: 01/06/2025  -     TSH W Reflex To Free T4; Future; Expected date: 01/06/2025  -     Hemoglobin A1C; Future; Expected date: 01/06/2025  -     Lipid Panel; Future; Expected date: 01/06/2025  2. Encounter for annual health examination  3. Vitamin D deficiency  -     Vitamin D; Future; Expected date: 01/06/2025  4. Memory change  -     Vitamin B12; Future; Expected date: 01/06/2025  5. Exudative age-related macular degeneration, bilateral, with active choroidal neovascularization (HCC) she is following with ophthalmology  6. Asthmatic bronchitis , chronic (HCC) stable  7. Malignant neoplasm of lower-inner quadrant of right breast of female, estrogen receptor positive (HCC) she is following with oncology currently on anastrozole  8 mixed conductive and sensorineural hearing loss of both ears continue with hearing aids  9 hypertension controlled continue with current medication    10 hyperlipidemia continue with current medication  11 left ventricular hypertrophy due to hypertensive disease, without heart failure stable she is  following with cardiology  12 aortic stenosis mild she is following with cardiology  13 atherosclerosis of abdominal aorta asymptomatic continue to monitor  14 atherosclerosis of coronary artery continue with statin  15 drug-induced osteoporosis repeat bone density scan is okay  16.  Moderate change will check TSH B12 start donepezil  Other orders  -     Donepezil HCl; Take 1 tablet (5 mg total) by mouth nightly.  Dispense: 90 tablet; Refill: 0    The patient indicates understanding of these issues and agrees to the plan.  Reinforced healthy diet, lifestyle, and exercise.      No follow-ups on file.     ABHISHEK MARK MD, 1/6/2025     Supplementary Documentation:   General Health:  In the past six months, have you lost more than 10 pounds without trying?: (Patient-Rptd) 2 - No  Has your appetite been poor?: (Patient-Rptd) No  Type of Diet: (Patient-Rptd) Balanced  How does the patient maintain a good energy level?: (Patient-Rptd) Daily Walks  How would you describe your daily physical activity?: (Patient-Rptd) Moderate  How would you describe your current health state?: (Patient-Rptd) Good  How do you maintain positive mental well-being?: (Patient-Rptd) Puzzles;Games  On a scale of 0 to 10, with 0 being no pain and 10 being severe pain, what is your pain level?: (Patient-Rptd) 3 - (Mild)  In the past six months, have you experienced urine leakage?: (Patient-Rptd) 0-No  At any time do you feel concerned for the safety/well-being of yourself and/or your children, in your home or elsewhere?: (Patient-Rptd) No  Have you had any immunizations at another office such as Influenza, Hepatitis B, Tetanus, or Pneumococcal?: (Patient-Rptd) No    Health Maintenance   Topic Date Due    Zoster Vaccines (1 of 2) Never done    Influenza Vaccine (1) 10/01/2024    Annual Physical  01/22/2025    HTN: BP Follow-Up  02/06/2025    DEXA Scan  Completed    Annual Depression Screening  Completed    Fall Risk Screening (Annual)  Completed     Pneumococcal Vaccine: 65+ Years  Completed    COVID-19 Vaccine  Completed    Colorectal Cancer Screening  Discontinued

## 2025-01-15 ENCOUNTER — NURSE ONLY (OUTPATIENT)
Dept: LAB | Facility: HOSPITAL | Age: 80
End: 2025-01-15
Attending: NURSE PRACTITIONER
Payer: MEDICARE

## 2025-01-15 DIAGNOSIS — Z00.00 MEDICARE ANNUAL WELLNESS VISIT, SUBSEQUENT: ICD-10-CM

## 2025-01-15 DIAGNOSIS — R41.3 MEMORY CHANGE: ICD-10-CM

## 2025-01-15 DIAGNOSIS — E55.9 VITAMIN D DEFICIENCY: ICD-10-CM

## 2025-01-15 LAB
ALBUMIN SERPL-MCNC: 5.2 G/DL (ref 3.2–4.8)
ALBUMIN/GLOB SERPL: 1.9 {RATIO} (ref 1–2)
ALP LIVER SERPL-CCNC: 82 U/L
ALT SERPL-CCNC: 22 U/L
ANION GAP SERPL CALC-SCNC: 10 MMOL/L (ref 0–18)
AST SERPL-CCNC: 24 U/L (ref ?–34)
BASOPHILS # BLD AUTO: 0.04 X10(3) UL (ref 0–0.2)
BASOPHILS NFR BLD AUTO: 0.5 %
BILIRUB SERPL-MCNC: 0.6 MG/DL (ref 0.2–1.1)
BUN BLD-MCNC: 10 MG/DL (ref 9–23)
BUN/CREAT SERPL: 12.8 (ref 10–20)
CALCIUM BLD-MCNC: 9.7 MG/DL (ref 8.7–10.4)
CHLORIDE SERPL-SCNC: 105 MMOL/L (ref 98–112)
CHOLEST SERPL-MCNC: 167 MG/DL (ref ?–200)
CO2 SERPL-SCNC: 27 MMOL/L (ref 21–32)
CREAT BLD-MCNC: 0.78 MG/DL
DEPRECATED RDW RBC AUTO: 44.5 FL (ref 35.1–46.3)
EGFRCR SERPLBLD CKD-EPI 2021: 77 ML/MIN/1.73M2 (ref 60–?)
EOSINOPHIL # BLD AUTO: 0.08 X10(3) UL (ref 0–0.7)
EOSINOPHIL NFR BLD AUTO: 1 %
ERYTHROCYTE [DISTWIDTH] IN BLOOD BY AUTOMATED COUNT: 13.8 % (ref 11–15)
EST. AVERAGE GLUCOSE BLD GHB EST-MCNC: 126 MG/DL (ref 68–126)
FASTING PATIENT LIPID ANSWER: YES
FASTING STATUS PATIENT QL REPORTED: YES
GLOBULIN PLAS-MCNC: 2.8 G/DL (ref 2–3.5)
GLUCOSE BLD-MCNC: 121 MG/DL (ref 70–99)
HBA1C MFR BLD: 6 % (ref ?–5.7)
HCT VFR BLD AUTO: 40.2 %
HDLC SERPL-MCNC: 68 MG/DL (ref 40–59)
HGB BLD-MCNC: 13.2 G/DL
IMM GRANULOCYTES # BLD AUTO: 0.02 X10(3) UL (ref 0–1)
IMM GRANULOCYTES NFR BLD: 0.2 %
LDLC SERPL CALC-MCNC: 79 MG/DL (ref ?–100)
LYMPHOCYTES # BLD AUTO: 1.74 X10(3) UL (ref 1–4)
LYMPHOCYTES NFR BLD AUTO: 21.2 %
MCH RBC QN AUTO: 28.8 PG (ref 26–34)
MCHC RBC AUTO-ENTMCNC: 32.8 G/DL (ref 31–37)
MCV RBC AUTO: 87.6 FL
MONOCYTES # BLD AUTO: 0.65 X10(3) UL (ref 0.1–1)
MONOCYTES NFR BLD AUTO: 7.9 %
NEUTROPHILS # BLD AUTO: 5.67 X10 (3) UL (ref 1.5–7.7)
NEUTROPHILS # BLD AUTO: 5.67 X10(3) UL (ref 1.5–7.7)
NEUTROPHILS NFR BLD AUTO: 69.2 %
NONHDLC SERPL-MCNC: 99 MG/DL (ref ?–130)
OSMOLALITY SERPL CALC.SUM OF ELEC: 294 MOSM/KG (ref 275–295)
PLATELET # BLD AUTO: 266 10(3)UL (ref 150–450)
POTASSIUM SERPL-SCNC: 3.9 MMOL/L (ref 3.5–5.1)
PROT SERPL-MCNC: 8 G/DL (ref 5.7–8.2)
RBC # BLD AUTO: 4.59 X10(6)UL
SODIUM SERPL-SCNC: 142 MMOL/L (ref 136–145)
TRIGL SERPL-MCNC: 112 MG/DL (ref 30–149)
TSI SER-ACNC: 1.21 UIU/ML (ref 0.55–4.78)
VIT B12 SERPL-MCNC: 590 PG/ML (ref 211–911)
VIT D+METAB SERPL-MCNC: 34.4 NG/ML (ref 30–100)
VLDLC SERPL CALC-MCNC: 17 MG/DL (ref 0–30)
WBC # BLD AUTO: 8.2 X10(3) UL (ref 4–11)

## 2025-01-15 PROCEDURE — 80053 COMPREHEN METABOLIC PANEL: CPT

## 2025-01-15 PROCEDURE — 36415 COLL VENOUS BLD VENIPUNCTURE: CPT

## 2025-01-15 PROCEDURE — 83036 HEMOGLOBIN GLYCOSYLATED A1C: CPT

## 2025-01-15 PROCEDURE — 82607 VITAMIN B-12: CPT

## 2025-01-15 PROCEDURE — 84443 ASSAY THYROID STIM HORMONE: CPT

## 2025-01-15 PROCEDURE — 85025 COMPLETE CBC W/AUTO DIFF WBC: CPT

## 2025-01-15 PROCEDURE — 82306 VITAMIN D 25 HYDROXY: CPT

## 2025-01-15 PROCEDURE — 80061 LIPID PANEL: CPT

## 2025-01-21 ENCOUNTER — TELEPHONE (OUTPATIENT)
Dept: INTERNAL MEDICINE CLINIC | Facility: CLINIC | Age: 80
End: 2025-01-21

## 2025-01-21 NOTE — TELEPHONE ENCOUNTER
Dr Martines, please advise    Patient (name and  verified) is calling back stating that she received a message this morning from your office that you wanted to discuss her results.     Reviewed the lab result notes-patient has viewed the messages in [employer]. No new phone calls were noted this morning.    Patient wanted to make sure she was not missing a message from you?

## 2025-02-11 DIAGNOSIS — I10 ESSENTIAL HYPERTENSION: ICD-10-CM

## 2025-02-12 ENCOUNTER — NURSE TRIAGE (OUTPATIENT)
Dept: INTERNAL MEDICINE CLINIC | Facility: CLINIC | Age: 80
End: 2025-02-12

## 2025-02-12 NOTE — TELEPHONE ENCOUNTER
She can stop taking donepezil and see if the symptoms will improve , if better let us know, if persist let us know

## 2025-02-12 NOTE — TELEPHONE ENCOUNTER
Action Requested: Summary for Provider     []  Critical Lab, Recommendations Needed  [x] Need Additional Advice  []   FYI    []   Need Orders  [] Need Medications Sent to Pharmacy  []  Other     SUMMARY: Patient states she was prescribed donepezil for memory issues.  Patient is taking donepezil at night before bed. Patient states ever since she started taking this medication, she wakes up 3-4 times at night. Patient states she usually does not have a problem sleeping.  Patient also states when she wakes up, she is achy and eyes are watery.  Patient mentioned she is receiving treatments for her macular degeneration.    Patient would like to know if she can stop taking donepezil,and if there are other alternative medications.   Please advise.    Reason for call: Sleep Problem  Onset: Data Unavailable                     Reason for Disposition   Difficulty falling to sleep or staying asleep    Protocols used: Insomnia-A-OH

## 2025-02-12 NOTE — TELEPHONE ENCOUNTER
RN called patient. Patient's date of birth and full name both confirmed.   RN informed of provider's message as detailed .   She verbalizes understanding of all information, and agreeable to plan.     Future Appointments   Date Time Provider Department Center   4/7/2025  8:40 AM [facility] EM [facility]   4/28/2025  9:20 AM Eugenie Rawls MD [facility]   7/14/2025 10:30 AM Janey Martines MD [id number]  [place] [address]

## 2025-02-14 RX ORDER — AMLODIPINE BESYLATE 5 MG/1
5 TABLET ORAL 2 TIMES DAILY
Qty: 180 TABLET | Refills: 3 | Status: SHIPPED | OUTPATIENT
Start: 2025-02-14

## 2025-02-14 NOTE — TELEPHONE ENCOUNTER
Refill passed per [facility] protocols.    Requested Prescriptions   Pending Prescriptions Disp Refills    amLODIPine 5 MG Oral Tab 180 tablet 3     Sig: Take 1 tablet (5 mg total) by mouth 2 (two) times daily.       Hypertension Medications Protocol Passed - 2/14/2025 12:31 PM        Passed - CMP or BMP in past 12 months        Passed - Last BP reading less than 140/90     BP Readings from Last 1 Encounters:   01/06/25 135/85               Passed - In person appointment or virtual visit in the past 12 mos or appointment in next 3 mos     Recent Outpatient Visits              1 month ago Medicare annual wellness visit, subsequent    [facility]    Nurse Only    1 month ago Medicare annual wellness visit, subsequent    [facility] Janey Martines MD    Office Visit    2 months ago Hyperlipidemia    [facility] [place]    Nurse Only    2 months ago Malignant neoplasm of lower-inner quadrant of right breast of female, estrogen receptor positive (HCC)    [facility] Hematology Oncology Eugenie Rawls MD    Office Visit    7 months ago Primary hypertension    [facility] Janey Martines MD    Office Visit          Future Appointments         Provider Department Appt Notes    In 1 month [facility] Mammography - [facility]     In 2 months Eugenie Rawls MD [facility] Hematology Oncology [place] FOLLOW UP VISIT    In 5 months Janey Martines MD [facility]                     Passed - EGFRCR or GFRNAA > 50     GFR Evaluation  EGFRCR: 77 , resulted on 1/15/2025          Passed - Medication is active on med list

## 2025-02-19 DIAGNOSIS — J45.20 MILD INTERMITTENT ASTHMA WITH ALLERGIC RHINITIS WITHOUT COMPLICATION (HCC): ICD-10-CM

## 2025-02-24 RX ORDER — FLUTICASONE FUROATE AND VILANTEROL 200; 25 UG/1; UG/1
1 POWDER RESPIRATORY (INHALATION) DAILY
Qty: 3 EACH | Refills: 3 | Status: SHIPPED | OUTPATIENT
Start: 2025-02-24

## 2025-02-24 NOTE — TELEPHONE ENCOUNTER
Refill passed per [facility] protocol.     Requested Prescriptions   Pending Prescriptions Disp Refills    BREO ELLIPTA 200-25 MCG/ACT Inhalation Aerosol Powder, Breath Activated [Pharmacy Med Name: BREO ELLIPTA -25]  3     Sig: USE 1 INHALATION ORALLY    DAILY       Asthma & COPD Medication Protocol Passed - 2/24/2025  8:46 AM        Passed - Appointment in past 6 or next 3 months      Recent Outpatient Visits              1 month ago Medicare annual wellness visit, subsequent    [facility]    Nurse Only    1 month ago Medicare annual wellness visit, subsequent    [facility] Janey Martines MD    Office Visit    2 months ago Hyperlipidemia    [facility] [place]    Nurse Only    3 months ago Malignant neoplasm of lower-inner quadrant of right breast of female, estrogen receptor positive (HCC)    [facility] Hematology Oncology Eugenie Rawls MD    Office Visit    7 months ago Primary hypertension    [facility] Janey Martines MD    Office Visit          Future Appointments         Provider Department Appt Notes    In 1 month [facility] Mammography - [facility]     In 2 months Eugenie Rawls MD [facility] Hematology Oncology [place] FOLLOW UP VISIT    In 4 months Janey Martines MD [facility]                     Passed - Medication is active on med list             [unfilled]      [unfilled]

## 2025-03-08 DIAGNOSIS — I10 ESSENTIAL HYPERTENSION: ICD-10-CM

## 2025-03-09 RX ORDER — LOSARTAN POTASSIUM 100 MG/1
100 TABLET ORAL DAILY
Qty: 90 TABLET | Refills: 3 | Status: SHIPPED | OUTPATIENT
Start: 2025-03-09

## 2025-03-09 NOTE — TELEPHONE ENCOUNTER
Refill passed per [facility] protocol.    Requested Prescriptions   Pending Prescriptions Disp Refills    LOSARTAN 100 MG Oral Tab [Pharmacy Med Name: LOSARTAN POT TAB 100MG] 90 tablet 3     Sig: TAKE 1 TABLET DAILY       Hypertension Medications Protocol Passed - 3/9/2025 10:47 AM        Passed - CMP or BMP in past 12 months        Passed - Last BP reading less than 140/90     BP Readings from Last 1 Encounters:   01/06/25 135/85               Passed - In person appointment or virtual visit in the past 12 mos or appointment in next 3 mos     Recent Outpatient Visits              1 month ago Medicare annual wellness visit, subsequent    [facility]    Nurse Only    2 months ago Medicare annual wellness visit, subsequent    [facility] Janey Martines MD    Office Visit    3 months ago Hyperlipidemia    [facility]    Nurse Only    3 months ago Malignant neoplasm of lower-inner quadrant of right breast of female, estrogen receptor positive (HCC)    [facility] Hematology Oncology Eugenie Rawls MD    Office Visit    8 months ago Primary hypertension    [facility] Janey Martines MD    Office Visit          Future Appointments         Provider Department Appt Notes    In 4 weeks [address]     In 1 month Eugenie Rawls MD [facility] Hematology Oncology [place] FOLLOW UP VISIT    In 4 months Janey Martines MD [facility]                     Passed - EGFRCR or GFRNAA > 50     GFR Evaluation  EGFRCR: 77 , resulted on 1/15/2025          Passed - Medication is active on med list             Future Appointments         Provider Department Appt Notes    In 4 weeks [address]     In 1  month Eugenie Rawls MD [facility] Hematology Oncology [place] FOLLOW UP VISIT    In 4 months Janey Martines MD [facility]             Recent Outpatient Visits              1 month ago Medicare annual wellness visit, subsequent    [facility]    Nurse Only    2 months ago Medicare annual wellness visit, subsequent    [facility] Janey Martines MD    Office Visit    3 months ago Hyperlipidemia    [facility] [place]    Nurse Only    3 months ago Malignant neoplasm of lower-inner quadrant of right breast of female, estrogen receptor positive (HCC)    [facility] Hematology Oncology Eugenie Rawls MD    Office Visit    8 months ago Primary hypertension    [facility] Janey Martines MD    Office Visit

## 2025-04-04 NOTE — TELEPHONE ENCOUNTER
Please review.  Protocol failed / Has no protocol.     Requested Prescriptions   Pending Prescriptions Disp Refills    DONEPEZIL 5 MG Oral Tab [Pharmacy Med Name: DONEPEZIL HCL 5 MG TABLET] 90 tablet 3     Sig: TAKE 1 TABLET BY MOUTH EVERY DAY AT NIGHT       There is no refill protocol information for this order

## 2025-04-07 ENCOUNTER — HOSPITAL ENCOUNTER (OUTPATIENT)
Dept: MAMMOGRAPHY | Facility: HOSPITAL | Age: 80
Discharge: HOME | End: 2025-04-07
Attending: INTERNAL MEDICINE
Payer: MEDICARE

## 2025-04-07 DIAGNOSIS — C50.311 MALIGNANT NEOPLASM OF LOWER-INNER QUADRANT OF RIGHT BREAST OF FEMALE, ESTROGEN RECEPTOR POSITIVE (HCC): ICD-10-CM

## 2025-04-07 DIAGNOSIS — Z17.0 MALIGNANT NEOPLASM OF LOWER-INNER QUADRANT OF RIGHT BREAST OF FEMALE, ESTROGEN RECEPTOR POSITIVE (HCC): ICD-10-CM

## 2025-04-07 PROCEDURE — 77061 BREAST TOMOSYNTHESIS UNI: CPT | Performed by: INTERNAL MEDICINE

## 2025-04-07 PROCEDURE — 77065 DX MAMMO INCL CAD UNI: CPT | Performed by: INTERNAL MEDICINE

## 2025-04-07 RX ORDER — DONEPEZIL HYDROCHLORIDE 5 MG/1
5 TABLET, FILM COATED ORAL NIGHTLY
Qty: 90 TABLET | Refills: 3 | Status: SHIPPED | OUTPATIENT
Start: 2025-04-07

## 2025-04-07 NOTE — DISCHARGE INSTRUCTIONS
The Doctor (Radiologist) who performed your procedure was:     Place an ice pack over the biopsy site on top of your bra or on top of the ACE wrap (never apply ice directly over skin) for 10-15 minutes of every hour until bedtime for your comfort and to decrease bleeding.  Keep your sports bra or the ACE wrap (stereotactic and MRI biopsy) in place for 24 hours after your biopsy. This compression decreases bleeding and breast movement for your comfort. Wear a supportive bra for the next couple of days for comfort (sports bra for sleep).   Continue to wear, preferably, a sports bra or good supportive bra for 1 week and take off only to shower.  No baths or showers during the first 24 hours after biopsy. After this time you may take a shower. It's okay if the strips get wet but do not soak them. NO saunas, hot tubs or swimming until steri-strips fall off (approx. 5 days). This prevents infection and allows time for them to completely close and heal.  DO NOT remove the steri-strips. They will fall off in 5 days. If any type of irritation (redness, itching or blisters) develops in the area around the steri-strips, remove them gently. If the steri-strips do not fall off after 5 days, gently remove them. Keep the area clean and dry.  It is normal to have mild discomfort and bruising at the biopsy site.  You may take Tylenol as needed for discomfort, as long as you have no allergies to Tylenol. Do not take aspirin, motrin, ibuprofen or any medication containing NSAID (non-steroidal anti-inflammatory drug) product for 48 hours.  DO NOT participate in strenuous activity (aerobics, heavy lifting, housework, gardening, etc.) 48 hours after your biopsy to prevent bleeding.  You will receive results in 2-3 business days.  If you are having an MRI breast biopsy or an Ultrasound guided breast biopsy, you will be billed for the biopsy and unilateral mammogram separately.  If you have any questions about the procedure or your  results, please contact the Breast Care Coordinator Nurse at [phone].  Notify your ordering physician or primary physician for increased bleeding, pain or fever over 100. Or contact a Radiology Nurse at [phone] between 8am-4pm (after 4pm, your call will be directed to the [facility]).

## 2025-04-07 NOTE — IMAGING NOTE
Discussed recommended breast biopsy with patient.  Patient is being recommended for stereotactic biopsy of the right  Breast  by Dr. Beckford . 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 Pt has 2 sons.  Super orders were placed Pt was provided Wed  checking in at 715 am [facility] .   Hx breast ca dx  age 76. Tx was lumpectomy no rad tx no chemo  Pt history discussed as below:  Pt history of biopsy: yes  +  right breast ca         Family history of cancer: yes sister  of bladder ca  brother has prostate brother just dx colon ca  Pt history of breast cancer: yes dx   Hx BCP use:     no                HRT use:    no ivf no     BI-RADS CATEGORY:     DIAGNOSTIC CATEGORY 4 - SUSPICIOUS       RECOMMENDATIONS:   STEREOTACTIC BREAST BIOPSY: RIGHT BREAST       Recommedations :                      see [facility] for dictated radiology report   Reviewed pertinent patient history, family history of cancer, and patient medications  Discussed with patient Radiology’s protocol regarding medications, as well as over-the-counter vitamin or herbal supplements, as follows:  -All herbal supplements, Vitamin E, Fish Oil    -All NSAIDs (Ibuprofen, Motrin, Advil, Aleve, or other antiinflammatory medication)  and Aspirin  81mg currently being taken    not  recommended or prescribed by  your physician  should be held for 5  days prior to biopsy.  Denies usage   -Aspirin 81 mg being taken related to a cardiac condition  or prescribed by your  physician should be held at the  direction of your physician.  Informed patient to call ordering physician for guidelines denies usage    -Blood thinners/antiplatelet medications (Coumadin, Plavix  ect) should be held at the  direction of your physician.  Informed patient to call ordering physician for guidelines denies  usage   Reviewed Stereotactic biopsy procedure, as below.  For this procedure,   stereotactic biopsy is being performed with tomosynthesis , you will sitting upright  with your  breast in compression.  Mammography imaging will be used to locate the area in question that was seen on your previous breast imaging.  The Radiologist will then inject a local numbing medication into the area and then use a needle to collect cells from the site.  A marker, or clip, will then be placed in the biopsied area.  This marker is placed so this biopsy site is able to be accurately located upon future breast imaging.  After the clip is placed,  a dressing will be placed on the biopsy site.  Additional mammography films will then be taken to assure correct placement of the marker.    Educated the patient they will be awake during this procedure and are able to drive themselves home if they wish.    Educated patient that some soreness may occur after biopsy.  Discussed use of a supportive bra and ice packs after procedure, to decrease soreness.    Discussed with patient no swimming, bathing,  hot tubs , or submerging underwater for 5 days and   until the incision is closed and healed.  Educated patient on lifting restrictions - nothing heavier than a gallon of milk for  48 hours after the procedure.      Discussed with patient that some soreness and bruising is normal after biopsy but that prolonged or increased pain and bruising should be reported to the ordering physician.  An ace wrap will be applied over bra for added support and should be left on for 24 hours post procedure.  Reviewed results process with patient and shared that pathology results will be available within 2-3 business days of their biopsy.  Discussed results will be communicated by their ordering physician unless otherwise indicated.  Educated patient that once we receive an order from their physician our radiology secretaries will be calling them to schedule their biopsy.

## 2025-04-09 ENCOUNTER — HOSPITAL ENCOUNTER (OUTPATIENT)
Dept: MAMMOGRAPHY | Facility: HOSPITAL | Age: 80
Discharge: HOME | End: 2025-04-09
Attending: INTERNAL MEDICINE
Payer: MEDICARE

## 2025-04-09 DIAGNOSIS — R92.1 BREAST CALCIFICATION, RIGHT: ICD-10-CM

## 2025-04-09 DIAGNOSIS — R92.8 ABNORMAL MAMMOGRAM OF RIGHT BREAST: ICD-10-CM

## 2025-04-09 PROCEDURE — 88305 TISSUE EXAM BY PATHOLOGIST: CPT | Performed by: INTERNAL MEDICINE

## 2025-04-09 PROCEDURE — 19081 BX BREAST 1ST LESION STRTCTC: CPT | Performed by: INTERNAL MEDICINE

## 2025-04-09 NOTE — IMAGING NOTE
History taken and as follows: CONCLUSION:        Heterogeneous linear segmental calcification central slightly outer right breast.  Stereotactic biopsy right breast recommended.  Findings and recommendations were discussed with the patient at the time of the examination.     0750 Dr BUTCHER AND DR GODFREY here to explain risk and benefits of procedure. Questions answered by the physcian    0723  Pt consented at  SITE MARKED RIGHT   Breast    Placed in chair  at  0750 scouts taken    0750 Time out taken    0757 Chloro prep as skin prep.   Lidocaine 1% 10  Milligrams per ml 5 ml given for superficial anesthetic affect at 0758      0759 Lidocaine  1% with epinephrine  1:100,000 units for deeper anesthetic affect 15 ML given.  More images taken after local  was given.    Sampling begins at 0802     Sampling complete at  0804  Core tainer taken to be imaged.    0808  Formalin added to samples.    0805   BUCKLE  clip inserted . Procedure completed . Pressure to site manually by nurse  and by machine compression for 10 minutes .    No active bleeding noted.  Area cleaned steri strips to site . Ice pack to site .     0821 [facility]     WHEN  post clip images  completed  Dressing dry and intact . Nipple markers removed by STAFF  Bra applied per patient. 6\" ace secured over bra by STAFF       0810 Post instructions  Given verbal et written AVS  summary sheet provided to patient. Patient verbalizes understanding and agreement.    PT TO BE discharged BY MAMMO STAFF

## 2025-04-11 ENCOUNTER — TELEPHONE (OUTPATIENT)
Dept: ULTRASOUND IMAGING | Facility: HOSPITAL | Age: 80
End: 2025-04-11

## 2025-04-28 ENCOUNTER — OFFICE VISIT (OUTPATIENT)
Age: 80
End: 2025-04-28
Attending: INTERNAL MEDICINE
Payer: MEDICARE

## 2025-04-28 VITALS
DIASTOLIC BLOOD PRESSURE: 72 MMHG | HEIGHT: 59 IN | SYSTOLIC BLOOD PRESSURE: 155 MMHG | HEART RATE: 80 BPM | OXYGEN SATURATION: 96 % | RESPIRATION RATE: 18 BRPM | BODY MASS INDEX: 30.24 KG/M2 | TEMPERATURE: 99 F | WEIGHT: 150 LBS

## 2025-04-28 DIAGNOSIS — Z51.81 ENCOUNTER FOR MONITORING AROMATASE INHIBITOR THERAPY: ICD-10-CM

## 2025-04-28 DIAGNOSIS — Z98.890 HISTORY OF LUMPECTOMY OF RIGHT BREAST: ICD-10-CM

## 2025-04-28 DIAGNOSIS — Z79.811 ENCOUNTER FOR MONITORING AROMATASE INHIBITOR THERAPY: ICD-10-CM

## 2025-04-28 DIAGNOSIS — Z17.0 MALIGNANT NEOPLASM OF LOWER-INNER QUADRANT OF RIGHT BREAST OF FEMALE, ESTROGEN RECEPTOR POSITIVE (HCC): Primary | ICD-10-CM

## 2025-04-28 DIAGNOSIS — M85.80 OSTEOPENIA DUE TO CANCER THERAPY: ICD-10-CM

## 2025-04-28 DIAGNOSIS — C50.311 MALIGNANT NEOPLASM OF LOWER-INNER QUADRANT OF RIGHT BREAST OF FEMALE, ESTROGEN RECEPTOR POSITIVE (HCC): Primary | ICD-10-CM

## 2025-04-28 RX ORDER — ANASTROZOLE 1 MG/1
1 TABLET ORAL DAILY
Qty: 90 TABLET | Refills: 3 | Status: SHIPPED | OUTPATIENT
Start: 2025-04-28

## 2025-04-28 NOTE — PROGRESS NOTES
Hematology Oncology Progress Note    Patient Name: Maddy Rose   YOB: 1945   Medical Record Number: [id number]   Attending Physician: Eugenie Rawls MD     Date of Visit: 2025       Chief Complaint:  Breast cancer, AI monitoring     Oncologic History:  79 year old with screen detected right breast cancer s/p right lumpectomy: pT1cN0, g2, ER/OR 98%, HER2 neg, Ki 10%. Radiation omission. arimidex started 10/2022    Interval History:  Here for 6 months follow up and review of mammogram. Recent right breast biopsy was benign. Overall she is feeling well and tolerating anastrozole with no issues or side effects. She denies any breast changes or concerns. Denies any palpable masses or enlarged nodes. Denies arthralgia, myalgia, fatigue or unintentional weight loss.       PMH/PSH: Breast ca, asthma, renal caculi, HTN, OA  Family History: Sister  of bladder cancer, Niece with breast ca in her 40s  Social History: Does all her ADLs, helps take care of her  who is in a WC.    Current Outpatient Medications:     losartan 100 MG Oral Tab, Take 1 tablet (100 mg total) by mouth daily., Disp: 90 tablet, Rfl: 3    fluticasone furoate-vilanterol (BREO ELLIPTA) 200-25 MCG/ACT Inhalation Aerosol Powder, Breath Activated, Inhale 1 puff into the lungs daily., Disp: 3 each, Rfl: 3    amLODIPine 5 MG Oral Tab, Take 1 tablet (5 mg total) by mouth 2 (two) times daily., Disp: 180 tablet, Rfl: 3    ipratropium-albuterol 0.5-2.5 (3) MG/3ML Inhalation Solution, , Disp: , Rfl:     ROSUVASTATIN 10 MG Oral Tab, TAKE 1 TABLET NIGHTLY, Disp: 90 tablet, Rfl: 3    triamcinolone 0.1 % External Cream, Apply topically 2 (two) times daily as needed., Disp: 60 g, Rfl: 0    anastrozole 1 MG Oral Tab tab, Take 1 tablet (1 mg total) by mouth daily., Disp: 90 tablet, Rfl: 3    pantoprazole 40 MG Oral Tab EC, Take 1 tablet (40 mg total) by mouth before breakfast., Disp: 90 tablet, Rfl: 3    prednisoLONE 1 % Ophthalmic  Suspension, , Disp: , Rfl:     Multiple Vitamins-Minerals (PRESERVISION AREDS 2) Oral Cap, Take 2 tablets by mouth in the morning., Disp: , Rfl:     Levalbuterol Tartrate 45 MCG/ACT Inhalation Aerosol, Inhale 1 puff into the lungs every 4 (four) hours as needed for Wheezing., Disp: 3 Inhaler, Rfl: 2    fexofenadine 180 MG Oral Tab, Take 1 tablet (180 mg total) by mouth in the morning., Disp: , Rfl:     Cholecalciferol (VITAMIN D) 1000 UNITS Oral Cap, Take 1 tablet by mouth in the morning., Disp: , Rfl:     Biotin 1000 MCG Oral Tab, Take 1,000 mcg by mouth in the morning., Disp: , Rfl:     Calcium 500-125 MG-UNIT Oral Tab, Take 150 mg by mouth in the morning and 150 mg before bedtime., Disp: , Rfl:     Misc Natural Products (OSTEO BI-FLEX ADV DOUBLE ST OR), Take by mouth in the morning and before bedtime., Disp: , Rfl:     Multiple Vitamin Oral Tab, Take 1 tablet by mouth 3 (three) times a week., Disp: , Rfl:     donepezil 5 MG Oral Tab, Take 1 tablet (5 mg total) by mouth nightly. (Patient not taking: Reported on 4/28/2025), Disp: 90 tablet, Rfl: 3    Review of Systems: Oncology specific ROS negative except as per HPI    Vitals:  /72 (BP Location: Left arm, Patient Position: Sitting, Cuff Size: adult)   Pulse 80   Temp 98.8 °F (37.1 °C) (Oral)   Resp 18   Ht 1.499 m (4' 11\")   Wt 68 kg (150 lb)   LMP  (LMP Unknown)   SpO2 96%   BMI 30.30 kg/m²     Physical Exam:  General: Patient is alert and oriented x 3, not in acute distress.  Chest: Symmetric expansion, nonlabored breathing  Breast: R lumpectomy scar. No palpable masses in either breast. Axilla normal bilaterally.   Extremities: bilateral symmetric leg swelling  Neurological: motor strength grossly intact, MA4E  Psych: appropriate mood and affect      Labs:  Lab Results   Component Value Date    WBC 8.2 01/15/2025    RBC 4.59 01/15/2025    HGB 13.2 01/15/2025    HCT 40.2 01/15/2025    MCV 87.6 01/15/2025    MCH 28.8 01/15/2025    MCHC 32.8  01/15/2025    RDW 13.8 01/15/2025    .0 01/15/2025    MPV 9.0 11/20/2018     Lab Results   Component Value Date     01/15/2025    K 3.9 01/15/2025     01/15/2025    CO2 27.0 01/15/2025    BUN 10 01/15/2025    CREATSERUM 0.78 01/15/2025     (H) 01/15/2025    CA 9.7 01/15/2025    ALKPHO 82 01/15/2025    ALT 22 01/15/2025    AST 24 01/15/2025    BILT 0.6 01/15/2025    ALB 5.2 (H) 01/15/2025    TP 8.0 01/15/2025      Radiology:  [facility] BIOPSY STEREO W/KIP GUIDE 1 SITE RIGHT (CPT=19081)  Result Date: 4/11/2025  CONCLUSION: Uneventful tomosynthesis guided biopsy of the right upper outer calcifications, performed without immediate complication, and marked with the buckle shaped clip in appropriate positioning.  Pathology demonstrates benign findings and is concordant with imaging.   RECOMMENDATION:   If clinical assessment remains benign, annual bilateral screening mammogram is recommended, due in 6 months.     Dictated by (CST): Lavern Cano MD on 4/09/2025 at 8:36 AM     Finalized by (CST): Lavern Cano MD on 4/11/2025 at 7:12 AM      [address] [address] [phone]    [facility] KIP 2D+3D DIAGNOSTIC [facility] RIGHT (CPT=77065/29563)  Result Date: 4/7/2025  CONCLUSION:     Heterogeneous linear segmental calcification central slightly outer right breast.  Stereotactic biopsy right breast recommended.  Findings and recommendations were discussed with the patient at the time of the examination.  BI-RADS CATEGORY:   DIAGNOSTIC CATEGORY 4 - SUSPICIOUS   RECOMMENDATIONS:  STEREOTACTIC BREAST BIOPSY: RIGHT BREAST       PLEASE NOTE: NORMAL MAMMOGRAM DOES NOT EXCLUDE THE POSSIBILITY OF BREAST CANCER.  A CLINICALLY SUSPICIOUS PALPABLE LUMP SHOULD BE BIOPSIED.   For patients over the age of 40, the target due date for the patient's next mammogram has been entered into a reminder system.   Patient received a discharge summary from the technologist after  completion of exam.  Breast marker legend used on images  Triangle = Palpable lump [place] = Skin tag or mole BB = Nipple Linear vern = Scar Square = Pain    Dictated by (CST): Solomon Beckford MD on 4/07/2025 at 9:03 AM     Finalized by (CST): Solomon Beckford MD on 4/07/2025 at 10:09 AM      [address] [place] [address] [phone]     Pathology:    Right breast, central outer region (heterogeneous calcification); stereotactic/tomosynthesis-guided core biopsy:   Fragments of parenchymal and fibroadipose breast tissue demonstrating fibrocystic changes including features of fibroadenomatosis, focal apocrine metaplasia, microcyst formation, surrounding stromal fibrosis, and few microcalcifications.  Foci of hyalinized stromal fibrosis with accompanying mild chronic inflammatory infiltrates are also present.  No evidence of atypical epithelial hyperplasia, carcinoma in situ, infiltrating carcinoma, or other malignant infiltrates identified.        Cancer Staging   Breast cancer, stage 1, estrogen receptor positive, right (HCC)  Staging form: Breast, AJCC 8th Edition  - Pathologic: Stage IA (pT1c, pN0, cM0, G2, ER+, SD+, HER2-) - Signed by Zuleima Garcia MD on 10/6/2022      Impression and Plan:    Right breast invasive ductal carcinoma, grade 2, ER/SD > 90%, HER2 negative, Ki-67 10%:   - s/p lumpectomy and SNB; pT1cN0. Radiation omitted.  - started anastrozole 10/2022 and tolerating well with no side effects. Plan to complete 5 yrs (10/2027).   - right diagnostic mammogram 4/2025 showed a suspicious calcification, biopsied on 4/9/25 and was benign.    - no clinical or radiographic evidence of disease recurrence. Continue surveillance and adjuvant AI   - bilateral diagnostic mammogram will due in 10/2025.     Bone health   - osteopenia secondary to age and AI. received zometa q6 months x4 doses   - repeat DEXA scan in 3/2024 showed normal bone density; zometa discontinued   -  continue Ca and Vit D supplements.   - repeat DEXA in 2026       Return in about 6 months (around 10/28/2025).       Eugenie Rawls MD  Hematology Oncology

## 2025-05-29 ENCOUNTER — MED REC SCAN ONLY (OUTPATIENT)
Dept: INTERNAL MEDICINE CLINIC | Facility: CLINIC | Age: 80
End: 2025-05-29

## 2025-07-14 ENCOUNTER — OFFICE VISIT (OUTPATIENT)
Dept: INTERNAL MEDICINE CLINIC | Facility: CLINIC | Age: 80
End: 2025-07-14

## 2025-07-14 VITALS
SYSTOLIC BLOOD PRESSURE: 138 MMHG | BODY MASS INDEX: 31.65 KG/M2 | WEIGHT: 157 LBS | HEIGHT: 59 IN | DIASTOLIC BLOOD PRESSURE: 62 MMHG | OXYGEN SATURATION: 98 % | HEART RATE: 71 BPM

## 2025-07-14 DIAGNOSIS — I10 PRIMARY HYPERTENSION: Primary | ICD-10-CM

## 2025-07-14 PROCEDURE — 99214 OFFICE O/P EST MOD 30 MIN: CPT | Performed by: INTERNAL MEDICINE

## 2025-07-14 NOTE — PROGRESS NOTES
Subjective:     Patient ID: Maddy Rose is a 80 year old female.    HPI    History/Other: She came in today for follow-up.  She is doing okay.  She denies any complaints.  On and off for now she is having some joint pain.  She wants to know if it is due to arthritis.  She is following with oncology she is clinically stable she continues to take anastrozole  She is following with cardiology.  She does have a swelling on her legs.,  She is following with cardiology she did have her right surgery severe ablation for venous reflux disease he did recommend her to have procedure done on her left leg as well she has not decided yet.  Review of Systems   Constitutional: Negative.    Eyes: Negative.    Respiratory: Negative.     Cardiovascular: Negative.    Gastrointestinal: Negative.    Endocrine: Negative.    Genitourinary: Negative.    Musculoskeletal: Negative.    Allergic/Immunologic: Negative.    Neurological: Negative.    Hematological: Negative.    Psychiatric/Behavioral: Negative.       Current Medications[1]  Allergies:Allergies[2]    Past Medical History[3]   Past Surgical History[4]   Family History[5]   Social History: Short Social Hx on File[6]     Objective:   Physical Exam  Vitals and nursing note reviewed.   Constitutional:       Appearance: Normal appearance.   HENT:      Head: Normocephalic and atraumatic.   Cardiovascular:      Rate and Rhythm: Normal rate and regular rhythm.      Pulses: Normal pulses.      Heart sounds: Normal heart sounds.   Pulmonary:      Effort: Pulmonary effort is normal.      Breath sounds: Normal breath sounds.   Abdominal:      General: Bowel sounds are normal.      Palpations: Abdomen is soft.   Musculoskeletal:         General: Normal range of motion.      Cervical back: Normal range of motion and neck supple.   Skin:     General: Skin is warm and dry.   Neurological:      Mental Status: She is alert. Mental status is at baseline.   Psychiatric:         Mood and  Affect: Mood normal.         Assessment & Plan:   No diagnosis found.  Arthralgias most likely arthritis can take ibuprofen as needed  Venous insufficiency cardiology notes reviewed plan for left GSV ablation  Arctic stenosis stable she is following with cardiology  No orders of the defined types were placed in this encounter.      Meds This Visit:  Requested Prescriptions      No prescriptions requested or ordered in this encounter       Imaging & Referrals:  None            [1]   Current Outpatient Medications   Medication Sig Dispense Refill    anastrozole 1 MG Oral Tab tab Take 1 tablet (1 mg total) by mouth daily. 90 tablet 3    losartan 100 MG Oral Tab Take 1 tablet (100 mg total) by mouth daily. 90 tablet 3    fluticasone furoate-vilanterol (BREO ELLIPTA) 200-25 MCG/ACT Inhalation Aerosol Powder, Breath Activated Inhale 1 puff into the lungs daily. 3 each 3    amLODIPine 5 MG Oral Tab Take 1 tablet (5 mg total) by mouth 2 (two) times daily. 180 tablet 3    ipratropium-albuterol 0.5-2.5 (3) MG/3ML Inhalation Solution       ROSUVASTATIN 10 MG Oral Tab TAKE 1 TABLET NIGHTLY 90 tablet 3    triamcinolone 0.1 % External Cream Apply topically 2 (two) times daily as needed. 60 g 0    pantoprazole 40 MG Oral Tab EC Take 1 tablet (40 mg total) by mouth before breakfast. 90 tablet 3    prednisoLONE 1 % Ophthalmic Suspension       Multiple Vitamins-Minerals (PRESERVISION AREDS 2) Oral Cap Take 2 tablets by mouth in the morning.      Levalbuterol Tartrate 45 MCG/ACT Inhalation Aerosol Inhale 1 puff into the lungs every 4 (four) hours as needed for Wheezing. 3 Inhaler 2    fexofenadine 180 MG Oral Tab Take 1 tablet (180 mg total) by mouth in the morning.      Cholecalciferol (VITAMIN D) 1000 UNITS Oral Cap Take 1 tablet by mouth in the morning.      Biotin 1000 MCG Oral Tab Take 1,000 mcg by mouth in the morning.      Calcium 500-125 MG-UNIT Oral Tab Take 150 mg by mouth in the morning and 150 mg before bedtime.      Misc  Natural Products (OSTEO BI-FLEX ADV DOUBLE ST OR) Take by mouth in the morning and before bedtime.      Multiple Vitamin Oral Tab Take 1 tablet by mouth 3 (three) times a week.     [2]   Allergies  Allergen Reactions    Azithromycin HIVES    Hydrochlorothiazide MYALGIA   [3]   Past Medical History:   Asthma (HCC)    Practically whole life    Biliary calculus    Supposedly stone in neck of gallblatter    Breast CA (HCC)    Calculus of kidney    Cancer (HCC)    Breast CA    Colon polyp    Detected and removed during colonoscopy    Essential hypertension    On medication    Extrinsic asthma, unspecified    Hemorrhoids    Detected during recent physical    High blood pressure    High cholesterol    Hx of motion sickness    Can't read in the car    Hyperlipidemia    On medication    Kidney stones    multiple times has had kidney stones    Medicare annual wellness visit, subsequent    Osteoarthritis    Other and unspecified hyperlipidemia    Pneumonia due to organism    Pseudocholinesterase deficiency    Patient denies, and doesn't recall any prior incidences and states MD never mentioned it to her.    Unspecified essential hypertension    Unstable angina pectoris (HCC)    Visual impairment    Glasses   [4]   Past Surgical History:  Procedure Laterality Date    Cataract  08/11/2004    Colonoscopy  Approx 2009    Colonoscopy N/A 01/17/2018    Procedure: COLONOSCOPY;  Surgeon: Aaron Cordova MD;  Location: [facility]    Colonoscopy N/A 12/18/2019    Procedure: COLONOSCOPY;  Surgeon: Aaron Cordova MD;  Location: [facility] ENDO    Egd  2009    Lumpectomy right Right 09/19/2022    Right ARMANDO LOC    Armando biopsy stereo nodule 1 site right (cpt=19081) Right 04/09/2025    buckle clip    Tubal ligation  01/01/1982   [5]   Family History  Problem Relation Age of Onset    Breast Cancer Self 77    Dementia Mother     Heart Disorder Father 48        mi    High Cholesterol Father     Other (Other) Father     Cancer Sister          Bladder Cancer    Arthritis Sister     Cancer Brother 70        Colon Cancer    Heart Disorder Brother 57        cabg    Cancer Brother         Skin Cancer    Prostate Cancer Brother 60    Heart Disorder Paternal Grandmother     Other (stroke) Paternal Grandmother 82    Heart Disorder Paternal Grandfather 47        mi    Breast Cancer Niece         40-50's brother's daughter    Breast Cancer Other         mat great grandmother     Ovarian Cancer Neg     Pancreatic Cancer Neg    [6]   Social History  Socioeconomic History    Marital status:    Tobacco Use    Smoking status: Former     Current packs/day: 0.00     Average packs/day: 3.0 packs/day for 4.0 years (12.0 ttl pk-yrs)     Types: Cigarettes     Start date: 1963     Quit date: 1967     Years since quittin.5    Smokeless tobacco: Never    Tobacco comments:     Glad I quit   Vaping Use    Vaping status: Never Used   Substance and Sexual Activity    Alcohol use: Not Currently     Comment: socially only    Drug use: Never    Sexual activity: Not Currently     Partners: Male     Social Drivers of Health     Food Insecurity: No Food Insecurity (2025)    NCSS - Food Insecurity     Worried About Running Out of Food in the Last Year: No     Ran Out of Food in the Last Year: No   Transportation Needs: No Transportation Needs (2025)    NCSS - Transportation     Lack of Transportation: No   Housing Stability: Not At Risk (2025)    NCSS - Housing/Utilities     Has Housing: Yes     Worried About Losing Housing: No     Unable to Get Utilities: No

## 2025-07-23 ENCOUNTER — MED REC SCAN ONLY (OUTPATIENT)
Dept: INTERNAL MEDICINE CLINIC | Facility: CLINIC | Age: 80
End: 2025-07-23

## 2025-08-14 ENCOUNTER — MED REC SCAN ONLY (OUTPATIENT)
Dept: INTERNAL MEDICINE CLINIC | Facility: CLINIC | Age: 80
End: 2025-08-14

## (undated) DEVICE — MINOR GENERAL: Brand: MEDLINE INDUSTRIES, INC.

## (undated) DEVICE — DRAPE PACK CHEST & U BAR

## (undated) DEVICE — STANDARD HYPODERMIC NEEDLE,POLYPROPYLENE HUB: Brand: MONOJECT

## (undated) DEVICE — FLEXIBLE YANKAUER,MEDIUM TIP, NO VACUUM CONTROL: Brand: ARGYLE

## (undated) DEVICE — ADHESIVE MASTISOL 2/3ML

## (undated) DEVICE — SPONGE: SPECIALTY PEANUT XR 100/CS: Brand: MEDICAL ACTION INDUSTRIES

## (undated) DEVICE — CLIP SM INTNL HMCLP TNTLM ESCP

## (undated) DEVICE — SUT PDS II 3-0 PS-1 Z683G

## (undated) DEVICE — SUT SILK 2-0 FS 685G

## (undated) DEVICE — 12 ML SYRINGE LUER-LOCK TIP: Brand: MONOJECT

## (undated) DEVICE — Device: Brand: DEFENDO AIR/WATER/SUCTION AND BIOPSY VALVE

## (undated) DEVICE — GAMMEX® PI HYBRID SIZE 6.5, STERILE POWDER-FREE SURGICAL GLOVE, POLYISOPRENE AND NEOPRENE BLEND: Brand: GAMMEX

## (undated) DEVICE — DRAPE SHEET LARGE 76X55

## (undated) DEVICE — SNARE OPTMZ PLPCTM TRP

## (undated) DEVICE — Device

## (undated) DEVICE — SPECIMEN TRAP LUKI

## (undated) DEVICE — SOLUTION  .9 1000ML BTL

## (undated) DEVICE — Device: Brand: JELCO

## (undated) DEVICE — FORCEP RADIAL JAW 4

## (undated) DEVICE — GAUZE SPONGES,12 PLY: Brand: CURITY

## (undated) DEVICE — 3M™ STERI-STRIP™ REINFORCED ADHESIVE SKIN CLOSURES, R1547, 1/2 IN X 4 IN (12 MM X 100 MM), 6 STRIPS/ENVELOPE: Brand: 3M™ STERI-STRIP™

## (undated) DEVICE — Device: Brand: CUSTOM PROCEDURE KIT

## (undated) DEVICE — COVER PRB NEOGUARD 30X2.6CM US

## (undated) DEVICE — STERILE LATEX POWDER-FREE SURGICAL GLOVESWITH NITRILE COATING: Brand: PROTEXIS

## (undated) DEVICE — NEEDLE BLUNT BD 18G X 1-1/2

## (undated) DEVICE — ENDOSCOPY PACK - LOWER: Brand: MEDLINE INDUSTRIES, INC.

## (undated) DEVICE — SUT VICRYL 3-0 SH J416H

## (undated) DEVICE — MEDI-VAC NON-CONDUCTIVE SUCTION TUBING 6MM X 1.8M (6FT.) L: Brand: CARDINAL HEALTH

## (undated) DEVICE — ABDOMINAL PAD: Brand: CURITY

## (undated) DEVICE — CLIP MED INTNL HMCLP TNTLM

## (undated) DEVICE — SUT MONOCRYL 4-0 PS-2 Y496G

## (undated) DEVICE — CONTAINER GENT-L-KARE 4OZ GRAY

## (undated) DEVICE — SNARE 9MM 230CM 2.4MM EXACTO

## (undated) DEVICE — 6 ML SYRINGE LUER-LOCK TIP: Brand: MONOJECT

## (undated) DEVICE — YANKAUER SUCTION INSTRUMENT NO CONTROL VENT, BULB TIP, CLEAR: Brand: YANKAUER

## (undated) DEVICE — DRAPE TAPE: Brand: CONVERTORS

## (undated) NOTE — LETTER
[facility]  [address] [facility] [address]  Authorization for Imaging Procedure  Date of Procedure:     I hereby authorize                                , my physician and his/her assistants (if applicable), which may include medical students, residents, and/or fellows, to perform the following procedure and administer such anesthesia as may be determined necessary by my physician: STEREOTACTIC GUIDED BIOPSY WITH TOMOSYNTHESIS OF RIGHT BREAST WITH CLIP PLACEMENT on Maddy Rose.   2.  I recognize that during the procedure, unforeseen conditions may necessitate additional or different procedures than those listed above. I, therefore, further authorize and request that the above-named physician, assistants, or designees perform such procedures as are, in their judgment, necessary and desirable.    3.  My physician has discussed prior to my procedure the potential benefits, risks and side effects of this procedure; the likelihood of achieving goals; and potential problems that might occur during recuperation. They also discussed reasonable alternatives to the procedure, including risks, benefits, and side effects related to the alternatives and risks related to not receiving this procedure. I have had all my questions answered and I acknowledge that no guarantee has been made as to the result that may be obtained.    4.  Should the need arise during my procedure, which includes change of level of care prior to discharge, I also consent to the administration of blood and/or blood products. Further, I understand that despite careful testing and screening of blood or blood products by collecting agencies, I may still be subject to ill effects as a result of receiving a blood transfusion and/or blood products. The following are some, but not all, of the potential risks that can occur: fever and allergic reactions, hemolytic reactions, transmission of diseases such  as Hepatitis, AIDS and Cytomegalovirus (CMV) and fluid overload. In the event that I wish to have an autologous transfusion of my own blood, or a directed donor transfusion, I will discuss this with my physician.  Check only if Refusing Blood or Blood Products  I understand refusal of blood or blood products as deemed necessary by my physician may have serious consequences to my condition to include possible death. I hereby assume responsibility for my refusal and release the hospital, its personnel, and my physicians from any responsibility for the consequences of my refusal.   [  ] Patient Refuses Blood      5.  I authorize the use of any specimen, organs, tissues, body parts or foreign objects that may be removed from my body during the procedure for diagnosis, research or teaching purposes and their subsequent disposal by hospital authorities. I also authorize the release of specimen test results and/or written reports to my treating physician on the hospital medical staff or other referring or consulting physicians involved in my care, at the discretion of the Pathologist or my treating physician.    6.  I consent to the photographing or videotaping of the procedures to be performed, including appropriate portions of my body for medical, scientific, or educational purposes, provided my identity is not revealed by the pictures or by descriptive texts accompanying them. If the procedure has been photographed/videotaped, the physician will obtain the original picture, image, videotape or CD. The hospital will not be responsible for storage, release or maintenance of the picture, image, tape or CD.   7.  I consent to the presence of a  or observers in the operating room as deemed necessary by my physician or their designees.    8.  I recognize that in the event my procedure results in extended X-Ray/fluoroscopy time, I may develop a skin reaction.    9.  If I have a Do Not Attempt Resuscitation  (DNAR) order in place, that status will be suspended while in the operating room, procedural suite, and during the recovery period unless otherwise explicitly stated by me (or a person authorized to consent on my behalf). The performing physician or my attending physician will determine when the applicable recovery period ends for purposes of reinstating the DNAR order.  10.  I acknowledge that my physician has explained sedation/analgesia administration to me including the risk and benefits I consent to the administration of sedation/analgesia as may be necessary or desirable in the judgment of my physician.      I CERTIFY THAT I HAVE READ AND FULLY UNDERSTAND THE ABOVE CONSENT FOR THE PROCEDURE.     Signature of Patient: _____________________________________________________________  Responsible person in case of minor, unconscious: ____________________________________  Relationship to patient:  __________________________________________________________  Signature of Witness: _______________________________Date: _________Time: __________    Statement of Physician: My signature below affirms that prior to the time of the procedure, I have explained to the patient and/or her guardian, the risks and benefits involved in the proposed treatment and any reasonable alternative to the proposed treatment. I have also explained the risks and benefits involved in the refusal of the proposed treatment and have answered the patient's questions. If I have a significant financial interest in a co-management agreement or a significant financial interest in any product or implant, or other significant relationship used in the procedure/surgery, I have disclosed this and had a discussion with my patient.  Signature of Physician:   _________________________________Date:_____________Time:________    Patient Name: Maddy Rose : 6/15/1945  Printed: 2025   Medical Record #: [id number]

## (undated) NOTE — Clinical Note
Doing fine re cancer treatment, just many other med issues that have been challenging. She is due for bone density, but may have her PCP order when she sees her this month. Mammo scheduled in April. Offered counseling options and she will call me if interested. Sees you in May. thanks

## (undated) NOTE — LETTER
2/11/2018              [address] [place] [address]         Dear Ms Vivian Cintron,    I apologize for the delay in returning these results to you.     Your recent colonoscopy exam at [facility]

## (undated) NOTE — LETTER
[address], [place]  Authorization for Invasive Procedures  1.  I hereby authorize Dr. Nick Frazier , my physician and whomever may be designated as the doctor's assistant, to perform the following operation and/or procedure:  [place]on[place] performed for the purposes of advancing medicine, science, and/or education, provided my identity is not revealed. If the procedure has been videotaped, the physician/surgeon will obtain the original videotape.  The hospital will not be responsible for stor My signature below affirms that prior to the time of the procedure, I have explained to the patient and/or her legal representative, the risks and benefits involved in the proposed treatment and any reasonable alternative to the proposed treatment.  I have

## (undated) NOTE — LETTER
ASTHMA ACTION PLAN for Maisha Lara     : 6/15/1945     Date: 18  Doctor:  Dinora Jeronimo MD  Phone for doctor or clinic: [address], [address] [place] [id number] [phone]           ACT Goal: 20 or g Aerosol Powder, Breath Activated Inhale 1 puff into the lungs daily. ipratropium-albuterol (DUONEB) 0.5-2.5 (3) MG/3ML Inhalation Solution Use once a day if needed                  3. Red - Stop!  Danger! <50% Personal Best Peak Flow  Continue Controller

## (undated) NOTE — LETTER
ASTHMA ACTION PLAN for Dariel Barnes     : 6/15/1945     Date: 19  Doctor:  Paty Coleman MD  Phone for doctor or clinic: [address], [address] [place] [address]  [phone]           ACT Goal: 20 ipratropium-albuterol (DUONEB) 0.5-2.5 (3) MG/3ML Inhalation Solution Use once a day if needed                  3. Red - Stop!  Danger! <50% Personal Best Peak Flow  Continue Controller Medications But ADD:   Medicine not helping  Breathing is hard and fas

## (undated) NOTE — LETTER
[facility]  Administration of Anesthesia  1. Charanjit Leo, or _________________________________ acting on her behalf, (Patient) (Dependent/Representative) request to receive anesthesia for my pending procedure/operation/treatment.   A infections, high spinal block, spinal bleeding, seizure, cardiac arrest and death. 7. AWARENESS: I understand that it is possible (but unlikely) to have explicit memory of events from the operating room while under general anesthesia.   8. ELECTROCONVULSIV (Date) (Time)                                                                                               (Responsible person in case of minor/ unconscious pt) /Relationship    My signature below affirms that prior to the time of the procedure, I have ex

## (undated) NOTE — LETTER
[facility]  Administration of Anesthesia  1. Letta Gosselin, or _________________________________ acting on her behalf, (Patient) (Dependent/Representative) request to receive anesthesia for my pending procedure/operation/treatment.   A infections, high spinal block, spinal bleeding, seizure, cardiac arrest and death. 7. AWARENESS: I understand that it is possible (but unlikely) to have explicit memory of events from the operating room while under general anesthesia.   8. ELECTROCONVULSIV unconscious pt /Relationship    My signature below affirms that prior to the time of the procedure, I have explained to the patient and/or his/her guardian, the risks and benefits of undergoing anesthesia, as well as any reasonable alternatives.     _______

## (undated) NOTE — LETTER
[facility], [place]  Authorization for Invasive Procedures  1.  I hereby authorize Dr. Karen Castrejon** , my physician and whomever may be designated as the doctor's assistant, to perform the following operation and/or procedure: JAK performed for the purposes of advancing medicine, science, and/or education, provided my identity is not revealed. If the procedure has been videotaped, the physician/surgeon will obtain the original videotape.  The hospital will not be responsible for stor My signature below affirms that prior to the time of the procedure, I have explained to the patient and/or her legal representative, the risks and benefits involved in the proposed treatment and any reasonable alternative to the proposed treatment.  I have

## (undated) NOTE — LETTER
[address] [place] [address]      Authorization for Surgical Operation and Procedure     Date:___________                                                                                                         Time:__________  1. I hereby authorize                                                                  , MD, my physician and his/her assistants (if applicable), which may include medical students, residents, and/or fellows, to perform the following surgical operation/ procedure and administer such anesthesia as may be determined necessary by my physician:  Operation/Procedure name (s) Right breast wire localization  on Bahman Sidhu   2. I recognize that during the surgical operation/procedure, unforeseen conditions may necessitate additional or different procedures than those listed above. I, therefore, further authorize and request that the above-named surgeon, assistants, or designees perform such procedures as are, in their judgment, necessary and desirable. 3.   My surgeon/physician has discussed prior to my surgery the potential benefits, risks and side effects of this procedure; the likelihood of achieving goals; and potential problems that might occur during recuperation. They also discussed reasonable alternatives to the procedure, including risks, benefits, and side effects related to the alternatives and risks related to not receiving this procedure. I have had all my questions answered and I acknowledge that no guarantee has been made as to the result that may be obtained. 4.   Should the need arise during my operation or immediate post-operative period, I also consent to the administration of blood and/or blood products. Further, I understand that despite careful testing and screening of blood or blood products by collecting agencies, I may still be subject to ill effects as a result of receiving a blood transfusion and/or blood products. The following are some, but not all, of the potential risks that can occur: fever and allergic reactions, hemolytic reactions, transmission of diseases such as Hepatitis, AIDS and Cytomegalovirus (CMV) and fluid overload. In the event that I wish to have an autologous transfusion of my own blood, or a directed donor transfusion. I will discuss this with my physician. 5.   I authorize the use of any specimen, organs, tissues, body parts or foreign objects that may be removed from my body during the operation/procedure for diagnosis, research or teaching purposes and their subsequent disposal by hospital authorities. I also authorize the release of specimen test results and/or written reports to my treating physician on the hospital medical staff or other referring or consulting physicians involved in my care, at the discretion of the Pathologist or my treating physician. 6.   I consent to the photographing or videotaping of the operations or procedures to be performed, including appropriate portions of my body for medical, scientific, or educational purposes, provided my identity is not revealed by the pictures or by descriptive texts accompanying them. If the procedure has been photographed/videotaped, the surgeon will obtain the original picture, image, videotape or CD. The hospital will not be responsible for storage, release or maintenance of the picture, image, tape or CD.    7.   I consent to the presence of a  or observers in the operating room as deemed necessary by my physician or their designees. 8.   I recognize that in the event my procedure results in extended X-Ray/fluoroscopy time, I may develop a skin reaction. 9.  If I have a Do Not Attempt Resuscitation (DNAR) order in place, that status will be suspended while in the operating room, procedural suite, and during the recovery period unless otherwise explicitly stated by me (or a person authorized to consent on my behalf). The surgeon or my attending physician will determine when the applicable recovery period ends for purposes of reinstating the DNAR order. 10. Patients having a sterilization procedure: I understand that if the procedure is successful the results will be permanent and it will therefore be impossible for me to inseminate, conceive, or bear children. I also understand that the procedure is intended to result in sterility, although the result has not been guaranteed. 11. I acknowledge that my physician has explained sedation/analgesia administration to me including the risk and benefits I consent to the administration of sedation/analgesia as may be necessary or desirable in the judgment of my physician. I CERTIFY THAT I HAVE READ AND FULLY UNDERSTAND THE ABOVE CONSENT TO OPERATION and/or OTHER PROCEDURE.    _________________________________________  __________________________________  Signature of Patient     Signature of Responsible Person         ___________________________________         Printed Name of Responsible Person           _________________________________                  Relationship to Patient  _________________________________________  ______________________________  Signature of Witness          Date  Time    STATEMENT OF PHYSICIAN My signature below affirms that prior to the time of the procedure; I have explained to the patient and/or his/her legal representative, the risks and benefits involved in the proposed treatment and any reasonable alternative to the proposed treatment. I have also explained the risks and benefits involved in refusal of the proposed treatment and alternatives to the proposed treatment and have answered the patient's questions.  If I have a significant financial interest in a co-management agreement or a significant financial interest in any product or implant, or other significant relationship used in this procedure/surgery, I have disclosed this and had a discussion with my patient.     _______________________________________________________________ _____________________________  Winnie Becerra Physician)                                                                                         (Date)                                   (Time)        Patient Name: Mars Sharadlupillo    :    Printed: 2022      Medical Record #: [id number]                                              Page 1 of 1

## (undated) NOTE — LETTER
[address] [place] [address]      Authorization for Surgical Operation and Procedure     Date:___________                                                                                                         Time:__________  1. I hereby Dionne Mensah MD, my physician and his/her assistants (if applicable), which may include medical students, residents, and/or fellows, to perform the following surgical operation/ procedure and administer such anesthesia as may be determined necessary by my physician:  Operation/Procedure name (s) Right breast lumpectomy, right lymphoscintigraphy, right sentinel lymph node biopsy  on Jewell Sinks   2. I recognize that during the surgical operation/procedure, unforeseen conditions may necessitate additional or different procedures than those listed above. I, therefore, further authorize and request that the above-named surgeon, assistants, or designees perform such procedures as are, in their judgment, necessary and desirable. 3.   My surgeon/physician has discussed prior to my surgery the potential benefits, risks and side effects of this procedure; the likelihood of achieving goals; and potential problems that might occur during recuperation. They also discussed reasonable alternatives to the procedure, including risks, benefits, and side effects related to the alternatives and risks related to not receiving this procedure. I have had all my questions answered and I acknowledge that no guarantee has been made as to the result that may be obtained. 4.   Should the need arise during my operation or immediate post-operative period, I also consent to the administration of blood and/or blood products. Further, I understand that despite careful testing and screening of blood or blood products by collecting agencies, I may still be subject to ill effects as a result of receiving a blood transfusion and/or blood products. The following are some, but not all, of the potential risks that can occur: fever and allergic reactions, hemolytic reactions, transmission of diseases such as Hepatitis, AIDS and Cytomegalovirus (CMV) and fluid overload. In the event that I wish to have an autologous transfusion of my own blood, or a directed donor transfusion. I will discuss this with my physician. 5.   I authorize the use of any specimen, organs, tissues, body parts or foreign objects that may be removed from my body during the operation/procedure for diagnosis, research or teaching purposes and their subsequent disposal by hospital authorities. I also authorize the release of specimen test results and/or written reports to my treating physician on the hospital medical staff or other referring or consulting physicians involved in my care, at the discretion of the Pathologist or my treating physician. 6.   I consent to the photographing or videotaping of the operations or procedures to be performed, including appropriate portions of my body for medical, scientific, or educational purposes, provided my identity is not revealed by the pictures or by descriptive texts accompanying them. If the procedure has been photographed/videotaped, the surgeon will obtain the original picture, image, videotape or CD. The hospital will not be responsible for storage, release or maintenance of the picture, image, tape or CD.    7.   I consent to the presence of a  or observers in the operating room as deemed necessary by my physician or their designees. 8.   I recognize that in the event my procedure results in extended X-Ray/fluoroscopy time, I may develop a skin reaction. 9.  If I have a Do Not Attempt Resuscitation (DNAR) order in place, that status will be suspended while in the operating room, procedural suite, and during the recovery period unless otherwise explicitly stated by me (or a person authorized to consent on my behalf). The surgeon or my attending physician will determine when the applicable recovery period ends for purposes of reinstating the DNAR order. 10. Patients having a sterilization procedure: I understand that if the procedure is successful the results will be permanent and it will therefore be impossible for me to inseminate, conceive, or bear children. I also understand that the procedure is intended to result in sterility, although the result has not been guaranteed. 11. I acknowledge that my physician has explained sedation/analgesia administration to me including the risk and benefits I consent to the administration of sedation/analgesia as may be necessary or desirable in the judgment of my physician. I CERTIFY THAT I HAVE READ AND FULLY UNDERSTAND THE ABOVE CONSENT TO OPERATION and/or OTHER PROCEDURE.    _________________________________________  __________________________________  Signature of Patient     Signature of Responsible Person         ___________________________________         Printed Name of Responsible Person           _________________________________                  Relationship to Patient  _________________________________________  ______________________________  Signature of Witness          Date  Time    STATEMENT OF PHYSICIAN My signature below affirms that prior to the time of the procedure; I have explained to the patient and/or his/her legal representative, the risks and benefits involved in the proposed treatment and any reasonable alternative to the proposed treatment. I have also explained the risks and benefits involved in refusal of the proposed treatment and alternatives to the proposed treatment and have answered the patient's questions.  If I have a significant financial interest in a co-management agreement or a significant financial interest in any product or implant, or other significant relationship used in this procedure/surgery, I have disclosed this and had a discussion with my patient.     _______________________________________________________________ _____________________________  Bonnie Dear of Physician)                                                                                         (Date)                                   (Time)        Patient Name: Kvng Rodriguez    : [id number]   Printed: 2022      Medical Record #: [id number]                                              Page 1 of 1

## (undated) NOTE — LETTER
[address], [place]  Authorization for Invasive Procedures  1. I hereby authorize Dr. Dinorah Fuentes , my physician and whomever may be designated as the doctor's assistant, to perform the following operation and/or procedure:  Ultrasound guided right breast biopsy with clip placement on Calvin Frias at [facility].    2. My physician has explained to me the nature and purpose of the operation or other procedure, possible alternative methods of treatment, the risks involved and the possibility of complications to me. I understand the probable consequences of declining the recommended procedure and the alternative methods of treatment. I acknowledge that no guarantee has been made as to the result that may be obtained. 3. I recognize that during the course of this operation or other procedure, unforeseen conditions may necessitate additional or different procedures than those listed above. I, therefore, further authorize and request that the above-named physician, his/her physician assistants, or designees perform such procedures as are, in his/her professional opinion, necessary and desirable. If I have a Do Not Attempt Resuscitation (DNAR) order in place, that status will be suspended while in the operating room, procedural suite, and during the recovery period unless otherwise explicitly stated by me (or a person authorized to consent on my behalf). The surgeon or my attending physician will determine when the applicable recovery period ends for purposes of reinstating the DNAR order. 4. Should the need arise during my operation or immediate post-operative period; I also consent to the administration of blood and/or blood products.  Further, I understand that despite careful testing and screening of blood and blood products, I may still be subject to ill effects as a result of recieving a blood transfusion an/or blood producst. The following are some, but not all, of the potential risks that can occur: fever and allergic reactions, hemolytic reactions, transmission of disease such as hepatitis, AIDS, cytomegalovirus (CMV), and flluid overload. In the event that I wish to have autologous transfusions of my own blood, or a directed donor transfusion, I will discuss this with my physician. 5. I consent to the photographing of the operations or procedures to be performed for the purposes of advancing medicine, science, and/or education, provided my identity is not revealed. If the procedure has been videotaped, the physician/surgeon will obtain the original videotape. The [facility] will not be responsible for storage or maintenance of this tape. 6. I consent to the presence of a  or observer as deemed necessary by my physician or his designee. 7. Any tissues or organs removed in the operation or other procedure may be disposed of by and at the discretion of [facility].    8. I understand that the physician and his/her physician assistants may not be employees or agents of [facility], [facility], nor [facility], but are independent medical practitioners who have been permitted to use its facilities for the care and treatment of their patients. 9. Patients having a sterilization procedure: I understand that if the procedure is successful the results will be permanent and it will therefore be impossible for me to inseminate, conceive or bear children. I also understand that the procedure is intended to result in sterility, although the result has not been guaranteed. 10. I CERTIFY THAT I HAVE READ AND FULLY UNDERSTAND THE ABOVE CONSENT TO OPERATION and/or OTHER PROCEDURE. 11. I acknowledge that my physician has explained sedation/analgesia administration to me including the risks and benefits.  I consent to the administration of sedation/analgesia as may be necessary or desirable in the judgment of my physician. Signature of Patient:  ________________________________________________ Date: _________Time: _________    Responsible person in case of minor or unconscious: _____________________________Relationship: ____________     Witness Signature: ____________________________________________ Date: __________ Time: ___________    Statement of Physician  My signature below affirms that prior to the time of the procedure, I have explained to the patient and/or her legal representative, the risks and benefits involved in the proposed treatment and any reasonable alternative to the proposed treatment. I have also explained the risks and benefits involved in the refusal of the proposed treatment and have answered the patient's questions. If I have a significant financial interest in this procedure/surgery, I have disclosed this and had a discussion with my patient.     Signature of Physician:   ________________________________________Date: _________Time:_______ Patient Name: Jessica Bryant  : 6/15/1945   Printed: 2022    Medical Record #: [id number]